# Patient Record
Sex: FEMALE | Race: WHITE | NOT HISPANIC OR LATINO | Employment: OTHER | URBAN - METROPOLITAN AREA
[De-identification: names, ages, dates, MRNs, and addresses within clinical notes are randomized per-mention and may not be internally consistent; named-entity substitution may affect disease eponyms.]

---

## 2017-01-03 ENCOUNTER — HOSPITAL ENCOUNTER (OUTPATIENT)
Dept: RADIOLOGY | Facility: HOSPITAL | Age: 72
Discharge: HOME | End: 2017-01-03
Attending: INTERNAL MEDICINE
Payer: MEDICARE

## 2017-01-03 ENCOUNTER — OFFICE VISIT (OUTPATIENT)
Dept: PULMONOLOGY | Facility: CLINIC | Age: 72
End: 2017-01-03
Payer: MEDICARE

## 2017-01-03 VITALS
SYSTOLIC BLOOD PRESSURE: 192 MMHG | HEIGHT: 66 IN | OXYGEN SATURATION: 97 % | BODY MASS INDEX: 20.2 KG/M2 | WEIGHT: 125.69 LBS | HEART RATE: 70 BPM | DIASTOLIC BLOOD PRESSURE: 92 MMHG

## 2017-01-03 DIAGNOSIS — I50.32 CHRONIC DIASTOLIC CONGESTIVE HEART FAILURE: ICD-10-CM

## 2017-01-03 DIAGNOSIS — J90 PLEURAL EFFUSION, BILATERAL: ICD-10-CM

## 2017-01-03 DIAGNOSIS — R06.09 DYSPNEA ON EXERTION: Primary | ICD-10-CM

## 2017-01-03 DIAGNOSIS — R60.0 LEG EDEMA, LEFT: ICD-10-CM

## 2017-01-03 DIAGNOSIS — R05.3 CHRONIC COUGH: ICD-10-CM

## 2017-01-03 PROCEDURE — 93971 EXTREMITY STUDY: CPT | Mod: 26,LT,, | Performed by: RADIOLOGY

## 2017-01-03 PROCEDURE — 1126F AMNT PAIN NOTED NONE PRSNT: CPT | Mod: S$GLB,,, | Performed by: INTERNAL MEDICINE

## 2017-01-03 PROCEDURE — 1157F ADVNC CARE PLAN IN RCRD: CPT | Mod: S$GLB,,, | Performed by: INTERNAL MEDICINE

## 2017-01-03 PROCEDURE — 93971 EXTREMITY STUDY: CPT | Mod: TC

## 2017-01-03 PROCEDURE — 99999 PR PBB SHADOW E&M-EST. PATIENT-LVL V: CPT | Mod: PBBFAC,,, | Performed by: INTERNAL MEDICINE

## 2017-01-03 PROCEDURE — 1159F MED LIST DOCD IN RCRD: CPT | Mod: S$GLB,,, | Performed by: INTERNAL MEDICINE

## 2017-01-03 PROCEDURE — 99499 UNLISTED E&M SERVICE: CPT | Mod: S$PBB,,, | Performed by: INTERNAL MEDICINE

## 2017-01-03 PROCEDURE — 1160F RVW MEDS BY RX/DR IN RCRD: CPT | Mod: S$GLB,,, | Performed by: INTERNAL MEDICINE

## 2017-01-03 PROCEDURE — 99205 OFFICE O/P NEW HI 60 MIN: CPT | Mod: S$GLB,,, | Performed by: INTERNAL MEDICINE

## 2017-01-03 RX ORDER — ACETAMINOPHEN AND CODEINE PHOSPHATE 300; 30 MG/1; MG/1
1 TABLET ORAL 4 TIMES DAILY PRN
Qty: 90 TABLET | Refills: 0 | Status: SHIPPED | OUTPATIENT
Start: 2017-01-03 | End: 2017-01-13

## 2017-01-03 RX ORDER — CODEINE PHOSPHATE AND GUAIFENESIN 10; 100 MG/5ML; MG/5ML
5 SOLUTION ORAL 3 TIMES DAILY PRN
COMMUNITY
End: 2017-01-25 | Stop reason: SINTOL

## 2017-01-03 NOTE — PROGRESS NOTES
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  Over 10 yrs kidney did well til this yr.  Has had freq blood transfusions.  Has pnd and orhopnea improved with dialysis.    Had leg swelling and cough/sob with edema.  Dialysis 2 weeks ago for emergent dialysis- daily for 3 and now mwf.  Cough and sob has improved a bit.          The chief compliant  problem is new to me",   PFSH:  Past Medical History   Diagnosis Date    Anemia     BK viruria 2011    Bursitis of left hip     Cataract     Chronic kidney disease     Disorder of kidney and ureter     Encounter for blood transfusion     GERD (gastroesophageal reflux disease)      takes prilosec    Hyperlipidemia     Hypertension     Hypothyroidism     Osteoarthritis     Osteoporosis     Peptic ulcer disease     Primary biliary cirrhosis , ,      3 Liver Transplants (first 2 in same mo)    Sciatica     Urinary tract infection          Past Surgical History   Procedure Laterality Date    Combined liver-kidney transplant  2006     Primary Biliary Cirrhosis    Hepaticojejunostomy (pretty en y)  10/2006    3 previous liver transplants  1991-      at [place]    Cataract extraction bilateral w/ anterior vitrectomy      Appendectomy      Kidney transplant  2006     25% of Both Kidneys/Third Kidney     section, classic      Cholecystectomy      Cystoscopy      Tubal ligation      Tonsillectomy      Liver transplant       #3 liver transplants     Eye surgery       Social History   Substance Use Topics    Smoking status: Never Smoker    Smokeless tobacco: Never Used    Alcohol use No     Family History   Problem Relation Age of Onset    Hypertension " "Mother     Hypertension Father     No Known Problems Sister     Hypertension Brother     No Known Problems Brother     Kidney disease Neg Hx      Review of patient's allergies indicates:   Allergen Reactions    Flonase [fluticasone]      Local blisters    Banana Hives       Performance Status:The patient's activity level is functions out of house.      Review of Systems:  a review of eleven systems covering constitutional, Eye, HEENT, Psych, Respiratory, Cardiac, GI, , Musculoskeletal, Endocrine, Dermatologic was negative except for pertinent findings as listed ABOVE and below: as above.     Exam:Comprehensive exam done.   Visit Vitals    BP (!) 192/92 (BP Location: Right arm, Patient Position: Sitting, BP Method: Automatic)    Pulse 70    Ht 5' 5.5" (1.664 m)    Wt 57 kg (125 lb 10.6 oz)    SpO2 97%    BMI 20.59 kg/m2     Exam included Vitals as listed, and patient's appearance and affect and alertness and mood, oral exam for yeast and hygiene and pharynx lesions and Mallapatti (M) score, neck with inspection for jvd and masses and thyroid abnormalities and lymph nodes (supraclavicular and infraclavicular nodes and axillary also examined and noted if abn), chest exam included symmetry and effort and fremitus and percussion and auscultation, cardiac exam included rhythm and gallops and murmur and rubs and jvd and edema, abdominal exam for mass and hepatosplenomegaly and tenderness and hernias and bowel sounds, Musculoskeletal exam with muscle tone and posture and mobility/gait and  strength, and skin for rashes and cyanosis and pallor and turgor, extremity for clubbing.  Findings were normal except for pertinent findings listed below:  M 2 and left >> right leg edema, dull 1-2 finger breaths both lower lungs.    Radiographs (ct chest and cxr) reviewed: view by direct vision  cxr in oct with sm bilat effusions, ct abd earlier in yr bilat eff with good lung tissue.    Labs reviewed  labs      "     PFT results reviewed   The effort appears adequate.  The inspiratory portion of the flow volume loop is   normal.  The FVC and FEV1 are normal.  FEV1 measures 2.11 liters or 90% of   predicted.  FEV1/FVC is normal.  FEF 25 to 75 is in normal range.  After   administering an appropriate bronchodilator, no significant change is   appreciated.  Lung volumes demonstrate a normal TLC and FRC.  Diffusion capacity   is severely reduced.     IMPRESSION:  Pulmonary function studies demonstrate normal airflow and lung   volumes.  A severely reduced diffusion is present.  Tobacco consumption will   interfere with DLCO measurement and lead to  inaccurate results.  Clinical and   radiographic correlation is suggested.        HES/SN  dd: 02/14/2016 13:26:32 (CST)  td: 02/14/2016 18:44:50 (CST)  Doc ID   #[id number]  Job ID #[id number]    CONCLUSIONS     1 - Normal left ventricular systolic function (EF 55-60%).     2 - Concentric hypertrophy.     3 - Left ventricular diastolic dysfunction.     4 - Left atrial enlargement.     5 - Trivial aortic regurgitation.     6 - Moderate to severe mitral regurgitation.     7 - Mild tricuspid regurgitation.     8 - The estimated PA systolic pressure is 31 mmHg.     9 - Small to moderate pericardial effusion - appears greater anteriorly and right sided. No diastolic chamber collapse..             This document has been electronically    SIGNED BY: Fco Ball MD On: 11/16/2016 14:12    Plan:  Clinical impression is resonably certain and repeated evaluation prn +/- follow up will be needed as below.    Dacia was seen today for cough and shortness of breath.    Diagnoses and all orders for this visit:    Dyspnea on exertion  -     X-Ray Chest PA And Lateral; Future    Leg edema, left  -     US Lower Extremity Veins Left; Future    Pleural effusion, bilateral  -     X-Ray Chest PA And Lateral; Future    Chronic cough  -     acetaminophen-codeine 300-30mg (TYLENOL #3) 300-30 mg Tab; Take 1 tablet  by mouth 4 (four) times daily as needed (cough).  -     X-Ray Chest PA And Lateral; Future  -     umeclidinium-vilanterol (ANORO ELLIPTA) 62.5-25 mcg/actuation DsDv; Inhale 1 puff into the lungs once daily.    Chronic diastolic congestive heart failure  -     X-Ray Chest PA And Lateral; Future      Return if symptoms worsen or fail to improve.    Discussed with patient above for education the following:       Shortness of breath and cough likely due to fluid and diastolic dysfunction and mitral regurg (with high blood pressure).     Breathing test was good except for parameter lowered by anemia.    Chest xray and ct (abd) shows good lung tissue with fluid around both lungs (typical for heart/fluid problems as above).      Could try inhaler, anoro one a day, continue if helps.  Would recommend chest x ray once fluid optimal - hope cxr will be totally clear.    Codeine tablets reasonable as needed for cough.     Keep head of bed elevated, notify kidney and heart doctors of need to keep bed elevated.    Check swollen left leg for clot.

## 2017-01-03 NOTE — MR AVS SNAPSHOT
[facility]  [address]  [place] [address]  Phone: [phone]                  Dacia Hamilton   1/3/2017 11:00 AM   Office Visit    Description:  Female : 1945   Provider:  Krish Hilton MD   Department:  [facility]           Reason for Visit     Cough     Shortness of Breath           Diagnoses this Visit        Comments    Dyspnea on exertion    -  Primary     Leg edema, left         Pleural effusion, bilateral         Chronic cough         Chronic diastolic congestive heart failure                To Do List           Future Appointments        Provider Department Dept Phone    1/3/2017 2:30 PM [facility] US1 [facility] [phone]    2017 8:45 AM LAB, [facility] DRAW STATION [facility] Laboratory [phone]    2017 9:40 AM Kaylan Metz MD [facility] - Hematology [phone]    2017 11:00 AM CHAIR 07, [facility] CHEMO [facility] [phone]    2017 8:30 AM LAB, [place] [facility] [phone]      Goals (5 Years of Data)     None      Follow-Up and Disposition     Return if symptoms worsen or fail to improve.    Follow-up and Disposition History       These Medications        Disp Refills Start End    acetaminophen-codeine 300-30mg (TYLENOL #3) 300-30 mg Tab 90 tablet 0 1/3/2017 2017    Take 1 tablet by mouth 4 (four) times daily as needed (cough). - Oral    Pharmacy: [facility] [id number] [place] [address] Ph #: [phone]       umeclidinium-vilanterol (ANORO ELLIPTA) 62.5-25 mcg/actuation DsDv 1 each 11 1/3/2017     Inhale 1 puff into the lungs once daily. - Inhalation    Pharmacy: [facility] [id number] [place] [address] Ph #: [phone]         [facility] On Call     [facility] On [facility] Nurse Care Line -  Assistance  Registered nurses in the [facility] Center provide clinical advisement, health education, appointment booking, and  other advisory services.  Call for this free service at [phone].             Medications           Message regarding Medications     Verify the changes and/or additions to your medication regime listed below are the same as discussed with your clinician today.  If any of these changes or additions are incorrect, please notify your healthcare provider.        START taking these NEW medications        Refills    acetaminophen-codeine 300-30mg (TYLENOL #3) 300-30 mg Tab 0    Sig: Take 1 tablet by mouth 4 (four) times daily as needed (cough).    Class: Print    Route: Oral    umeclidinium-vilanterol (ANORO ELLIPTA) 62.5-25 mcg/actuation DsDv 11    Sig: Inhale 1 puff into the lungs once daily.    Class: Print    Route: Inhalation      STOP taking these medications     spironolactone (ALDACTONE) 25 MG tablet Take 25 mg by mouth once daily.           Verify that the below list of medications is an accurate representation of the medications you are currently taking.  If none reported, the list may be blank. If incorrect, please contact your healthcare provider. Carry this list with you in case of emergency.           Current Medications     alprazolam (XANAX) 0.25 MG tablet One po prn anxiety not to exceed one per day    amlodipine (NORVASC) 5 MG tablet Take 1 tablet (5 mg total) by mouth 2 (two) times daily.    calcitRIOL (ROCALTROL) 0.5 MCG Cap Take 1 capsule (0.5 mcg total) by mouth once daily.    calcium carbonate (OS-ALBINA) 600 mg (1,500 mg) Tab Take 600 mg by mouth 2 (two) times daily with meals.    cholestyramine (QUESTRAN) 4 gram packet Take 1 packet (4 g total) by mouth 2 (two) times daily.    fish oil-omega-3 fatty acids 300-1,000 mg capsule Take 2 g by mouth once daily.      folic acid (FOLVITE) 400 MCG tablet Take 800 mcg by mouth once daily.    furosemide (LASIX) 20 MG tablet Take 2 tablets (40 mg total) by mouth once daily.    guaifenesin-codeine 100-10 mg/5 ml (TUSSI-ORGANIDIN NR)  mg/5 mL syrup  "Take 5 mLs by mouth 3 (three) times daily as needed for Cough.    levothyroxine (SYNTHROID) 75 MCG tablet Take 1 tablet (75 mcg total) by mouth before breakfast. Every morning    losartan (COZAAR) 50 MG tablet One po at noon    magnesium oxide (MAG-OX) 400 mg tablet Take 1 tablet (400 mg total) by mouth 3 (three) times daily.    multivitamin capsule Every morning    mycophenolate (MYFORTIC) 180 MG TbEC Take 2 tablets (360 mg total) by mouth 3 (three) times daily after meals.    omeprazole (PRILOSEC) 20 MG capsule Take 1 capsule (20 mg total) by mouth once daily. Every day    phenyleph-min oil-petrolatum (PREPARATION H) 0.25-14-74.9 % Oint Place 1 applicator rectally 4 (four) times daily as needed.    RITUXIMAB (RITUXAN IV) Inject into the vein.    sodium bicarbonate 650 MG tablet Take 3 tablets (1,950 mg total) by mouth 2 (two) times daily.    tacrolimus (PROGRAF) 1 MG Cap Take 1 capsule (1 mg total) by mouth every 12 (twelve) hours.    zolpidem (AMBIEN) 5 MG Tab Take 1 tablet (5 mg total) by mouth nightly as needed (insomnia). At bedtime    acetaminophen-codeine 300-30mg (TYLENOL #3) 300-30 mg Tab Take 1 tablet by mouth 4 (four) times daily as needed (cough).    potassium chloride SA (K-DUR,KLOR-CON) 10 MEQ tablet Take 1 tablet (10 mEq total) by mouth every other day.    umeclidinium-vilanterol (ANORO ELLIPTA) 62.5-25 mcg/actuation DsDv Inhale 1 puff into the lungs once daily.           Clinical Reference Information           Vital Signs - Last Recorded  Most recent update: 1/3/2017 12:03 PM by Paula Jeffries MA    BP Pulse Ht Wt SpO2 BMI    (!) 192/92 (BP Location: Right arm, Patient Position: Sitting, BP Method: Automatic) 70 5' 5.5" (1.664 m) 57 kg (125 lb 10.6 oz) 97% 20.59 kg/m2      Blood Pressure          Most Recent Value    BP  (!)  192/92      Allergies as of 1/3/2017     Flonase [Fluticasone]    Banana      Immunizations Administered on Date of Encounter - 1/3/2017     None      Orders Placed " During Today's Visit     Future Labs/Procedures Expected by Expires    US Lower Extremity Veins Left  1/3/2017 1/3/2018    X-Ray Chest PA And Lateral  1/3/2017 1/3/2018      Maintenance Dialysis History     Patient has no recorded history of maintenance dialysis.      Transplant Information        Txp Date Organ Coordinator Care Team     Kidney Vianca Brambila Referring Physician:  Jose Alfredo Godfrey MD   Current Nephrologist:  Jose Alfredo Godfrey MD          Txp Date Organ Coordinator Care Team    6/4/2006 Liver Lorin Mejia RN Surgeon:  Brendan Amor MD   Assisting Surgeon:  Victor Hugo Mcintosh Jr., MD   Referring Physician:  Syeda Tong MD   Current Hepatologist:  Victor Hugo Wise MD   Corresponding Physician:  David Woods MD   Current PCP:  David Woods MD          Txp Date Organ Coordinator Care Team    6/4/2006 Kidney Lorin Mejia RN Surgeon:  Brendan Amor MD   Referring Physician:  Syeda Tong MD         Instructions    Shortness of breath and cough likely due to fluid and diastolic dysfunction and mitral regurg (with high blood pressure).     Breathing test was good except for parameter lowered by anemia.    Chest xray and ct (abd) shows good lung tissue with fluid around both lungs (typical for heart/fluid problems as above).      Could try inhaler, anoro one a day, continue if helps.  Would recommend chest x ray once fluid optimal - hope cxr will be totally clear.    Codeine tablets reasonable as needed for cough.     Keep head of bed elevated, notify kidney and heart doctors of need to keep bed elevated.    Check swollen left leg for clot.

## 2017-01-03 NOTE — PATIENT INSTRUCTIONS
Shortness of breath and cough likely due to fluid and diastolic dysfunction and mitral regurg (with high blood pressure).     Breathing test was good except for parameter lowered by anemia.    Chest xray and ct (abd) shows good lung tissue with fluid around both lungs (typical for heart/fluid problems as above).      Could try inhaler, anoro one a day, continue if helps.  Would recommend chest x ray once fluid optimal - hope cxr will be totally clear.    Codeine tablets reasonable as needed for cough.     Keep head of bed elevated, notify kidney and heart doctors of need to keep bed elevated.    Check swollen left leg for clot.

## 2017-01-04 ENCOUNTER — PATIENT MESSAGE (OUTPATIENT)
Dept: NEPHROLOGY | Facility: CLINIC | Age: 72
End: 2017-01-04

## 2017-01-04 ENCOUNTER — TELEPHONE (OUTPATIENT)
Dept: NEPHROLOGY | Facility: CLINIC | Age: 72
End: 2017-01-04

## 2017-01-04 NOTE — TELEPHONE ENCOUNTER
----- Message from Tomeka Dove sent at 1/3/2017  4:38 PM CST -----  Contact: Dinorah at [facility]   Dinorah at [facility] called stating that a refill request was sent but the LPN  signed it.  It can not be singed by a LPN   She will fax it again for the doctor's or NP signature  Just an SRI     Thanks

## 2017-01-05 ENCOUNTER — OFFICE VISIT (OUTPATIENT)
Dept: HEMATOLOGY/ONCOLOGY | Facility: CLINIC | Age: 72
End: 2017-01-05
Payer: MEDICARE

## 2017-01-05 ENCOUNTER — TELEPHONE (OUTPATIENT)
Dept: PULMONOLOGY | Facility: CLINIC | Age: 72
End: 2017-01-05

## 2017-01-05 ENCOUNTER — TELEPHONE (OUTPATIENT)
Dept: HEMATOLOGY/ONCOLOGY | Facility: CLINIC | Age: 72
End: 2017-01-05

## 2017-01-05 ENCOUNTER — INFUSION (OUTPATIENT)
Dept: INFUSION THERAPY | Facility: HOSPITAL | Age: 72
End: 2017-01-05
Attending: INTERNAL MEDICINE
Payer: MEDICARE

## 2017-01-05 VITALS
HEIGHT: 65 IN | SYSTOLIC BLOOD PRESSURE: 193 MMHG | WEIGHT: 129.88 LBS | DIASTOLIC BLOOD PRESSURE: 89 MMHG | RESPIRATION RATE: 19 BRPM | BODY MASS INDEX: 21.64 KG/M2 | HEART RATE: 70 BPM

## 2017-01-05 VITALS
TEMPERATURE: 98 F | RESPIRATION RATE: 16 BRPM | HEART RATE: 69 BPM | DIASTOLIC BLOOD PRESSURE: 87 MMHG | SYSTOLIC BLOOD PRESSURE: 163 MMHG

## 2017-01-05 DIAGNOSIS — D64.9 SEVERE ANEMIA: Primary | ICD-10-CM

## 2017-01-05 DIAGNOSIS — N18.5 CKD (CHRONIC KIDNEY DISEASE) STAGE 5, GFR LESS THAN 15 ML/MIN: ICD-10-CM

## 2017-01-05 DIAGNOSIS — D63.1 ANEMIA IN CKD (CHRONIC KIDNEY DISEASE): Primary | ICD-10-CM

## 2017-01-05 DIAGNOSIS — D64.9 SEVERE ANEMIA: ICD-10-CM

## 2017-01-05 DIAGNOSIS — Z29.89 PROPHYLACTIC IMMUNOTHERAPY: ICD-10-CM

## 2017-01-05 DIAGNOSIS — Z94.0 S/P KIDNEY TRANSPLANT: ICD-10-CM

## 2017-01-05 DIAGNOSIS — I10 ESSENTIAL HYPERTENSION: ICD-10-CM

## 2017-01-05 DIAGNOSIS — N18.9 ANEMIA IN CKD (CHRONIC KIDNEY DISEASE): Primary | ICD-10-CM

## 2017-01-05 PROCEDURE — 99999 PR PBB SHADOW E&M-EST. PATIENT-LVL III: CPT | Mod: PBBFAC,,, | Performed by: INTERNAL MEDICINE

## 2017-01-05 PROCEDURE — 1160F RVW MEDS BY RX/DR IN RCRD: CPT | Mod: S$GLB,,, | Performed by: INTERNAL MEDICINE

## 2017-01-05 PROCEDURE — 96367 TX/PROPH/DG ADDL SEQ IV INF: CPT | Mod: PN

## 2017-01-05 PROCEDURE — 25000003 PHARM REV CODE 250: Mod: PN | Performed by: INTERNAL MEDICINE

## 2017-01-05 PROCEDURE — 1157F ADVNC CARE PLAN IN RCRD: CPT | Mod: S$GLB,,, | Performed by: INTERNAL MEDICINE

## 2017-01-05 PROCEDURE — 99215 OFFICE O/P EST HI 40 MIN: CPT | Mod: S$GLB,,, | Performed by: INTERNAL MEDICINE

## 2017-01-05 PROCEDURE — 1159F MED LIST DOCD IN RCRD: CPT | Mod: S$GLB,,, | Performed by: INTERNAL MEDICINE

## 2017-01-05 PROCEDURE — 1126F AMNT PAIN NOTED NONE PRSNT: CPT | Mod: S$GLB,,, | Performed by: INTERNAL MEDICINE

## 2017-01-05 PROCEDURE — 96413 CHEMO IV INFUSION 1 HR: CPT | Mod: PN

## 2017-01-05 PROCEDURE — 96415 CHEMO IV INFUSION ADDL HR: CPT | Mod: PN

## 2017-01-05 PROCEDURE — 63600175 PHARM REV CODE 636 W HCPCS: Mod: PN | Performed by: INTERNAL MEDICINE

## 2017-01-05 PROCEDURE — 99499 UNLISTED E&M SERVICE: CPT | Mod: S$PBB,,, | Performed by: INTERNAL MEDICINE

## 2017-01-05 RX ORDER — FAMOTIDINE 20 MG/50ML
20 INJECTION, SOLUTION INTRAVENOUS
Status: CANCELLED | OUTPATIENT
Start: 2017-01-05

## 2017-01-05 RX ORDER — MEPERIDINE HYDROCHLORIDE 50 MG/ML
25 INJECTION INTRAMUSCULAR; INTRAVENOUS; SUBCUTANEOUS
Status: DISCONTINUED | OUTPATIENT
Start: 2017-01-05 | End: 2017-01-05 | Stop reason: HOSPADM

## 2017-01-05 RX ORDER — SODIUM CHLORIDE 0.9 % (FLUSH) 0.9 %
10 SYRINGE (ML) INJECTION
Status: CANCELLED | OUTPATIENT
Start: 2017-01-05

## 2017-01-05 RX ORDER — ACETAMINOPHEN 325 MG/1
650 TABLET ORAL
Status: COMPLETED | OUTPATIENT
Start: 2017-01-05 | End: 2017-01-05

## 2017-01-05 RX ORDER — ACETAMINOPHEN 325 MG/1
650 TABLET ORAL
Status: CANCELLED | OUTPATIENT
Start: 2017-01-05

## 2017-01-05 RX ORDER — HEPARIN 100 UNIT/ML
500 SYRINGE INTRAVENOUS
Status: CANCELLED | OUTPATIENT
Start: 2017-01-05

## 2017-01-05 RX ORDER — MEPERIDINE HYDROCHLORIDE 25 MG/ML
25 INJECTION INTRAMUSCULAR; INTRAVENOUS; SUBCUTANEOUS
Status: CANCELLED | OUTPATIENT
Start: 2017-01-05 | End: 2017-01-05

## 2017-01-05 RX ORDER — SODIUM CHLORIDE 0.9 % (FLUSH) 0.9 %
10 SYRINGE (ML) INJECTION
Status: DISCONTINUED | OUTPATIENT
Start: 2017-01-05 | End: 2017-01-05 | Stop reason: HOSPADM

## 2017-01-05 RX ORDER — FAMOTIDINE 20 MG/50ML
20 INJECTION, SOLUTION INTRAVENOUS
Status: COMPLETED | OUTPATIENT
Start: 2017-01-05 | End: 2017-01-05

## 2017-01-05 RX ADMIN — DIPHENHYDRAMINE HYDROCHLORIDE 25 MG: 50 INJECTION, SOLUTION INTRAMUSCULAR; INTRAVENOUS at 11:01

## 2017-01-05 RX ADMIN — FAMOTIDINE 20 MG: 20 INJECTION, SOLUTION INTRAVENOUS at 11:01

## 2017-01-05 RX ADMIN — Medication 10 ML: at 11:01

## 2017-01-05 RX ADMIN — RITUXIMAB 630 MG: 10 INJECTION, SOLUTION INTRAVENOUS at 12:01

## 2017-01-05 RX ADMIN — SODIUM CHLORIDE: 0.9 INJECTION, SOLUTION INTRAVENOUS at 11:01

## 2017-01-05 RX ADMIN — ACETAMINOPHEN 650 MG: 325 TABLET ORAL at 10:01

## 2017-01-05 RX ADMIN — Medication 10 ML: at 02:01

## 2017-01-05 NOTE — TELEPHONE ENCOUNTER
Pt is in clinic today seeing Dr. Metz. Pt states she tried to  rx from pharmacy yesterday & her pharmacy informed her the prescription is no longer active.     Please contact pt to inform if this is still needed & if so, please send in new rx. Pt states she takes lasix 20 mg TID, please make sure dispense quantity reflects this.

## 2017-01-05 NOTE — TELEPHONE ENCOUNTER
Spoke to patient gave information from us fe[facility] lower ext.  No deep venous thrombosis, no clot.  Patient does have baker's cyst on left leg.  forwareded results to Dr Woods.  Asked patient to follow up with her PCP and may need top see orthopedic MD. Patient stated that she understands and  Agrees.

## 2017-01-05 NOTE — TELEPHONE ENCOUNTER
----- Message from Krish Hilton MD sent at 1/3/2017  4:04 PM CST -----  No blood clot, has cyst, no significant findings-- good.

## 2017-01-05 NOTE — PROGRESS NOTES
Subjective:       Patient ID: Dacia Hamilton is a 71 y.o. female.    Chief Complaint:   HPI:     70-year-old  woman coming in followup  . The patient has had a liver transplant for PBC as well as  kidney transplant. Two liver transplants done in [place] in 1991, combined    liver kidney transplant in [facility] in June 2006 for recurrent primary biliary    cirrhosis. She has CKD nearing dialysis GFR of 20, the patient is on Prograf and mycofenolate as    maintenance immunosuppression. The patient has had significant diarrhea needing  Imodium, Myfortic 360 mg t.i.d. No relief , then started on cholestyramine.with good response lately, had EBV and Parvo drawn.this was negative    . Patient had a recent bone marrow biopsy in July 2016, which showed    no significant issues that would explain the severity of her anemia. The anemia  has also been progressive, not responding to Epogen injections. She has also    had increased iron stores. Multiple transfusions, Recently the immuniosuppressive doses have been decreased,    .            after discussions with transplant and noting haptoglobin to be low. Pt was placed on 20 mg prednisone per transplants recommended doses pt had difficulty tolerating prednisone so started rituxan weekly. pt was transfused 10 days ago for significant anemia, and has started dialysis. And feels much better overall, feels like all the swelling has improved and not as tired. Last rituxan was delayed due to av access       PHYSICAL EXAM:     Vitals:    01/05/17 0917   BP: (!) 193/89   Pulse: 70   Resp: 19   wt 129lbs    GENERAL: Comfortable looking patient. Patient is in no distress.chronically ill appearing  Awake, alert and oriented to time, person and place.  No anxiety, or agitation.      HEENT: Normal conjunctivae and eyelids. WNL.  PERRLA 3 to 4 mm. No icterus, no pallor, no congestion, and no discharge noted.     NECK:  Supple. Trachea is central.  No crepitus.  No JVD or  masses.    RESPIRATORY:  No intercostal retractions.  No dullness to percussion.  Chest is clear to auscultation.  No rales, rhonchi or wheezes.  No crepitus.  Good air entry bilaterally.    CARDIOVASCULAR:  S1 and S2 are normally heard without murmurs or gallops.  All peripheral pulses are present.    ABDOMEN:  Normal abdomen.  No hepatosplenomegaly.  No free fluid.  Bowel sounds are present.  No hernia noted. No masses.  No rebound or tenderness.  No guarding or rigidity.  Umbilicus is midline.    LYMPHATICS:  No axillary, cervical, supraclavicular, submental, or inguinal lymphadenopathy.    SKIN/MUSCULOSKELETAL:  There is no evidence of excoriation marks or ecchmosis.  No rashes.  No cyanosis.  No clubbing.  No joint or skeletal deformities noted.  Normal range of motion.    NEUROLOGIC:  Higher functions are appropriate.  No cranial nerve deficits.  Normal fadia.  Normal strength.  Motor and sensory functions are normal.  Deep tendon reflexes are normal.    GENITAL/RECTAL:  Exams are deferred.      Laboratory:     CBC:  Lab Results   Component Value Date    WBC 2.22 (L) 01/05/2017    RBC 3.17 (L) 01/05/2017    HGB 9.4 (L) 01/05/2017    HCT 30.2 (L) 01/05/2017    MCV 95 01/05/2017    MCH 29.7 01/05/2017    MCHC 31.1 (L) 01/05/2017    RDW 19.6 (H) 01/05/2017     01/05/2017    MPV 9.9 01/05/2017    GRAN 1.4 (L) 01/05/2017    GRAN 64.4 01/05/2017    LYMPH 0.3 (L) 01/05/2017    LYMPH 13.1 (L) 01/05/2017    MONO 0.4 01/05/2017    MONO 16.2 (H) 01/05/2017    EOS 0.1 01/05/2017    BASO 0.02 01/05/2017    EOSINOPHIL 5.4 01/05/2017    BASOPHIL 0.9 01/05/2017       BMP: BMP  Lab Results   Component Value Date     (L) 01/05/2017    K 3.2 (L) 01/05/2017    CL 93 (L) 01/05/2017    CO2 37 (H) 01/05/2017    BUN 9 01/05/2017    CREATININE 1.87 (H) 01/05/2017    CALCIUM 8.2 (L) 01/05/2017    ANIONGAP 5 (L) 01/05/2017    ESTGFRAFRICA 30 (A) 01/05/2017    EGFRNONAA 26 (A) 01/05/2017       LFT:   Lab Results   Component  Value Date    ALT 26 01/05/2017    AST 30 01/05/2017    GGT 25 09/02/2014    ALKPHOS 58 01/05/2017    BILITOT 0.7 01/05/2017       Assessment/Plan:       1. Anemia in CKD (chronic kidney disease)    2. Severe anemia    3. Essential hypertension    4. CKD (chronic kidney disease) stage 5, GFR less than 15 ml/min    5. S/P kidney transplant    6. Prophylactic immunotherapy        Proceed with rituxan for hemolytic anemia.   Could she come off some immunosuppresants not that she has started dialysis   rtc 1 week with cbc, cmp for ongoing rituxan

## 2017-01-06 RX ORDER — FUROSEMIDE 40 MG/1
TABLET ORAL
Qty: 60 TABLET | Refills: 4 | Status: SHIPPED | OUTPATIENT
Start: 2017-01-06 | End: 2017-01-15 | Stop reason: SDUPTHER

## 2017-01-06 NOTE — PLAN OF CARE
Problem: Patient Care Overview (Adult)  Goal: Plan of Care Review  Outcome: Ongoing (interventions implemented as appropriate)  Adequate for discharge.   Pt tolerated infusion without noted distress.  Reviewed upcoming appointments.  All questions answered.  Ambulated from infusion center independently with daughter.

## 2017-01-09 ENCOUNTER — LAB VISIT (OUTPATIENT)
Dept: LAB | Facility: HOSPITAL | Age: 72
End: 2017-01-09
Attending: INTERNAL MEDICINE
Payer: MEDICARE

## 2017-01-09 ENCOUNTER — TELEPHONE (OUTPATIENT)
Dept: TRANSPLANT | Facility: CLINIC | Age: 72
End: 2017-01-09

## 2017-01-09 DIAGNOSIS — Z94.4 LIVER TRANSPLANTED: ICD-10-CM

## 2017-01-09 LAB
ALBUMIN SERPL BCP-MCNC: 3 G/DL
ALP SERPL-CCNC: 67 U/L
ALT SERPL W/O P-5'-P-CCNC: 12 U/L
ANION GAP SERPL CALC-SCNC: 10 MMOL/L
AST SERPL-CCNC: 24 U/L
BASOPHILS # BLD AUTO: ABNORMAL K/UL
BASOPHILS NFR BLD: 1 %
BILIRUB SERPL-MCNC: 0.6 MG/DL
BUN SERPL-MCNC: 17 MG/DL
CALCIUM SERPL-MCNC: 8.8 MG/DL
CHLORIDE SERPL-SCNC: 95 MMOL/L
CO2 SERPL-SCNC: 31 MMOL/L
CREAT SERPL-MCNC: 2.9 MG/DL
DIFFERENTIAL METHOD: ABNORMAL
EOSINOPHIL # BLD AUTO: ABNORMAL K/UL
EOSINOPHIL NFR BLD: 9 %
ERYTHROCYTE [DISTWIDTH] IN BLOOD BY AUTOMATED COUNT: 19.4 %
EST. GFR  (AFRICAN AMERICAN): 17.8 ML/MIN/1.73 M^2
EST. GFR  (NON AFRICAN AMERICAN): 15.5 ML/MIN/1.73 M^2
GLUCOSE SERPL-MCNC: 84 MG/DL
HCT VFR BLD AUTO: 31.5 %
HGB BLD-MCNC: 10.2 G/DL
LYMPHOCYTES # BLD AUTO: ABNORMAL K/UL
LYMPHOCYTES NFR BLD: 16 %
MCH RBC QN AUTO: 29.7 PG
MCHC RBC AUTO-ENTMCNC: 32.4 %
MCV RBC AUTO: 92 FL
MONOCYTES # BLD AUTO: ABNORMAL K/UL
MONOCYTES NFR BLD: 10 %
NEUTROPHILS NFR BLD: 64 %
PLATELET # BLD AUTO: 252 K/UL
PLATELET BLD QL SMEAR: ABNORMAL
PMV BLD AUTO: 10.5 FL
POTASSIUM SERPL-SCNC: 3.8 MMOL/L
PROT SERPL-MCNC: 5.6 G/DL
RBC # BLD AUTO: 3.43 M/UL
SODIUM SERPL-SCNC: 136 MMOL/L
WBC # BLD AUTO: 1.84 K/UL

## 2017-01-09 PROCEDURE — 80053 COMPREHEN METABOLIC PANEL: CPT

## 2017-01-09 PROCEDURE — 36415 COLL VENOUS BLD VENIPUNCTURE: CPT | Mod: PO

## 2017-01-09 PROCEDURE — 80197 ASSAY OF TACROLIMUS: CPT

## 2017-01-09 PROCEDURE — 85007 BL SMEAR W/DIFF WBC COUNT: CPT

## 2017-01-09 PROCEDURE — 85027 COMPLETE CBC AUTOMATED: CPT

## 2017-01-09 NOTE — TELEPHONE ENCOUNTER
----- Message from Ashlee River MD sent at 10/27/2016  7:40 PM CDT -----  Yes, feel free to use rituxan.  Honestly, she is high risk candidate for txp based on age and prior immunosuppression; she cannot be approved for txp until hemolysis is controlled. (she is still in w/u)    ----- Message -----     From: Vianca Brambila     Sent: 10/27/2016  10:19 AM       To: MD Dr. Ozzy Corona,    Please see message below. Thanks!    ----- Message -----     From: Kaylan Metz MD     Sent: 10/27/2016  10:15 AM       To: Vianca Brambila    Regarding above pt, I started her on prednisone and she was intolerant of it.   Is it ok with transplant if I start rituxan for hemolytic anemia so i could get her hgb up.   Please let me know asap   Dr. Metz

## 2017-01-10 ENCOUNTER — TELEPHONE (OUTPATIENT)
Dept: TRANSPLANT | Facility: CLINIC | Age: 72
End: 2017-01-10

## 2017-01-10 LAB — TACROLIMUS BLD-MCNC: 3.2 NG/ML

## 2017-01-10 NOTE — TELEPHONE ENCOUNTER
----- Message from Victor Hugo Wise MD sent at 1/10/2017 10:30 AM CST -----  Results reviewed  Why is her WCC so low? Any new meds? CMV?

## 2017-01-10 NOTE — TELEPHONE ENCOUNTER
Called and spoke with pt.  States she feels well. Denies new medications, fever or any s/s of infection.  Advised her that her WBC was low. Will call her back with Dr. Wise' recommendations. Pt agreed with plan.

## 2017-01-11 ENCOUNTER — TELEPHONE (OUTPATIENT)
Dept: TRANSPLANT | Facility: CLINIC | Age: 72
End: 2017-01-11

## 2017-01-11 ENCOUNTER — PATIENT MESSAGE (OUTPATIENT)
Dept: TRANSPLANT | Facility: CLINIC | Age: 72
End: 2017-01-11

## 2017-01-11 DIAGNOSIS — Z94.4 LIVER TRANSPLANTED: Primary | ICD-10-CM

## 2017-01-11 NOTE — TELEPHONE ENCOUNTER
Per Dr. Wise pt to repeat labs 1/16/17 with CMV.     Called pt. No answer. LVM - repeat CBC with CMV Monday. Requested return call with any questions or concerns.

## 2017-01-12 ENCOUNTER — TELEPHONE (OUTPATIENT)
Dept: TRANSPLANT | Facility: CLINIC | Age: 72
End: 2017-01-12

## 2017-01-12 ENCOUNTER — INFUSION (OUTPATIENT)
Dept: INFUSION THERAPY | Facility: HOSPITAL | Age: 72
End: 2017-01-12
Attending: INTERNAL MEDICINE
Payer: MEDICARE

## 2017-01-12 ENCOUNTER — OFFICE VISIT (OUTPATIENT)
Dept: HEMATOLOGY/ONCOLOGY | Facility: CLINIC | Age: 72
End: 2017-01-12
Payer: MEDICARE

## 2017-01-12 VITALS — RESPIRATION RATE: 16 BRPM | DIASTOLIC BLOOD PRESSURE: 92 MMHG | SYSTOLIC BLOOD PRESSURE: 190 MMHG | HEART RATE: 67 BPM

## 2017-01-12 VITALS
HEIGHT: 65 IN | HEART RATE: 72 BPM | DIASTOLIC BLOOD PRESSURE: 84 MMHG | SYSTOLIC BLOOD PRESSURE: 173 MMHG | WEIGHT: 125.69 LBS | RESPIRATION RATE: 16 BRPM | TEMPERATURE: 98 F | BODY MASS INDEX: 20.94 KG/M2

## 2017-01-12 DIAGNOSIS — Z94.0 S/P KIDNEY TRANSPLANT: ICD-10-CM

## 2017-01-12 DIAGNOSIS — D63.1 ANEMIA IN CKD (CHRONIC KIDNEY DISEASE): Primary | ICD-10-CM

## 2017-01-12 DIAGNOSIS — Z76.82 KIDNEY TRANSPLANT CANDIDATE: ICD-10-CM

## 2017-01-12 DIAGNOSIS — N18.9 ANEMIA IN CKD (CHRONIC KIDNEY DISEASE): Primary | ICD-10-CM

## 2017-01-12 DIAGNOSIS — D64.9 SEVERE ANEMIA: ICD-10-CM

## 2017-01-12 DIAGNOSIS — D64.9 SEVERE ANEMIA: Primary | ICD-10-CM

## 2017-01-12 DIAGNOSIS — I50.32 CHRONIC DIASTOLIC CONGESTIVE HEART FAILURE: ICD-10-CM

## 2017-01-12 DIAGNOSIS — J90 PLEURAL EFFUSION, BILATERAL: Primary | ICD-10-CM

## 2017-01-12 DIAGNOSIS — I10 ESSENTIAL HYPERTENSION: ICD-10-CM

## 2017-01-12 DIAGNOSIS — D84.9 IMMUNOSUPPRESSED STATUS: ICD-10-CM

## 2017-01-12 PROCEDURE — 96415 CHEMO IV INFUSION ADDL HR: CPT | Mod: PN

## 2017-01-12 PROCEDURE — 1160F RVW MEDS BY RX/DR IN RCRD: CPT | Mod: S$GLB,,, | Performed by: INTERNAL MEDICINE

## 2017-01-12 PROCEDURE — 99999 PR PBB SHADOW E&M-EST. PATIENT-LVL III: CPT | Mod: PBBFAC,,, | Performed by: INTERNAL MEDICINE

## 2017-01-12 PROCEDURE — 25000003 PHARM REV CODE 250: Mod: PN | Performed by: INTERNAL MEDICINE

## 2017-01-12 PROCEDURE — 63600175 PHARM REV CODE 636 W HCPCS: Mod: PN | Performed by: INTERNAL MEDICINE

## 2017-01-12 PROCEDURE — 99215 OFFICE O/P EST HI 40 MIN: CPT | Mod: S$GLB,,, | Performed by: INTERNAL MEDICINE

## 2017-01-12 PROCEDURE — 1126F AMNT PAIN NOTED NONE PRSNT: CPT | Mod: S$GLB,,, | Performed by: INTERNAL MEDICINE

## 2017-01-12 PROCEDURE — 1157F ADVNC CARE PLAN IN RCRD: CPT | Mod: S$GLB,,, | Performed by: INTERNAL MEDICINE

## 2017-01-12 PROCEDURE — 1159F MED LIST DOCD IN RCRD: CPT | Mod: S$GLB,,, | Performed by: INTERNAL MEDICINE

## 2017-01-12 PROCEDURE — 96413 CHEMO IV INFUSION 1 HR: CPT | Mod: PN

## 2017-01-12 PROCEDURE — 99499 UNLISTED E&M SERVICE: CPT | Mod: S$PBB,,, | Performed by: INTERNAL MEDICINE

## 2017-01-12 PROCEDURE — 96367 TX/PROPH/DG ADDL SEQ IV INF: CPT | Mod: PN

## 2017-01-12 RX ORDER — MEPERIDINE HYDROCHLORIDE 25 MG/ML
25 INJECTION INTRAMUSCULAR; INTRAVENOUS; SUBCUTANEOUS
Status: CANCELLED | OUTPATIENT
Start: 2017-01-12 | End: 2017-01-12

## 2017-01-12 RX ORDER — ACETAMINOPHEN 325 MG/1
650 TABLET ORAL
Status: COMPLETED | OUTPATIENT
Start: 2017-01-12 | End: 2017-01-12

## 2017-01-12 RX ORDER — FAMOTIDINE 20 MG/50ML
20 INJECTION, SOLUTION INTRAVENOUS
Status: COMPLETED | OUTPATIENT
Start: 2017-01-12 | End: 2017-01-12

## 2017-01-12 RX ORDER — HEPARIN 100 UNIT/ML
500 SYRINGE INTRAVENOUS
Status: CANCELLED | OUTPATIENT
Start: 2017-01-12

## 2017-01-12 RX ORDER — ACETAMINOPHEN 325 MG/1
650 TABLET ORAL
Status: CANCELLED | OUTPATIENT
Start: 2017-01-12

## 2017-01-12 RX ORDER — FAMOTIDINE 20 MG/50ML
20 INJECTION, SOLUTION INTRAVENOUS
Status: CANCELLED | OUTPATIENT
Start: 2017-01-12 | End: 2017-01-12

## 2017-01-12 RX ORDER — FOLIC ACID/VIT B COMPLEX AND C 5 MG
1 TABLET ORAL NIGHTLY
Refills: 3 | COMMUNITY
Start: 2017-01-06 | End: 2017-04-11 | Stop reason: SDUPTHER

## 2017-01-12 RX ORDER — SODIUM CHLORIDE 0.9 % (FLUSH) 0.9 %
10 SYRINGE (ML) INJECTION
Status: DISCONTINUED | OUTPATIENT
Start: 2017-01-12 | End: 2017-01-12 | Stop reason: HOSPADM

## 2017-01-12 RX ORDER — SODIUM CHLORIDE 0.9 % (FLUSH) 0.9 %
10 SYRINGE (ML) INJECTION
Status: CANCELLED | OUTPATIENT
Start: 2017-01-12

## 2017-01-12 RX ADMIN — ACETAMINOPHEN 650 MG: 325 TABLET ORAL at 11:01

## 2017-01-12 RX ADMIN — SODIUM CHLORIDE, PRESERVATIVE FREE 10 ML: 5 INJECTION INTRAVENOUS at 11:01

## 2017-01-12 RX ADMIN — DIPHENHYDRAMINE HYDROCHLORIDE 25 MG: 50 INJECTION, SOLUTION INTRAMUSCULAR; INTRAVENOUS at 12:01

## 2017-01-12 RX ADMIN — SODIUM CHLORIDE: 9 INJECTION, SOLUTION INTRAVENOUS at 11:01

## 2017-01-12 RX ADMIN — FAMOTIDINE 20 MG: 20 INJECTION, SOLUTION INTRAVENOUS at 11:01

## 2017-01-12 RX ADMIN — RITUXIMAB 630 MG: 10 INJECTION, SOLUTION INTRAVENOUS at 12:01

## 2017-01-12 NOTE — TELEPHONE ENCOUNTER
----- Message from Kaylan Metz MD sent at 1/12/2017  9:52 AM CST -----  Ms. Pederson has started her dialysis and seems to be settling well to the routine. She sent a message wondering if any immunosuppressive drugs can be lowered in order to help with the severe anemia.   She and I are both surprised that the transplant discussion is still on board.   The rituxan can be changed or discontd if needed. At this point I think the meds contribute to the degree of anemia and so I am not seeing an adequate response to rituxan   Dr. Metz

## 2017-01-12 NOTE — PLAN OF CARE
Problem: Patient Care Overview (Adult)  Goal: Plan of Care Review  Outcome: Ongoing (interventions implemented as appropriate)  Pt tolerated treatment without complaints.

## 2017-01-12 NOTE — PROGRESS NOTES
Subjective:       Patient ID: Dacia Hamilton is a 71 y.o. female.    Chief Complaint:   HPI:     70-year-old  woman coming in followup  . The patient has had a liver transplant for PBC as well as  kidney transplant. 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 She has CKD nearing dialysis GFR of 20, the patient is on Prograf and mycofenolate as    maintenance immunosuppression. The patient has had significant diarrhea needing  Imodium, Myfortic 360 mg t.i.d. No relief , then started on cholestyramine.with good response lately, had EBV and Parvo drawn.this was negative    . Patient had a recent bone marrow biopsy in July 2016, which showed    no significant issues that would explain the severity of her anemia. The anemia  has also been progressive, not responding to Epogen injections. She has also    had increased iron stores. Multiple transfusions, Recently the immuniosuppressive doses have been decreased,    .            after discussions with transplant and noting haptoglobin to be low. Pt was placed on 20 mg prednisone per transplants recommended doses pt had difficulty tolerating prednisone so started rituxan weekly. pt was transfused 10 days ago for significant anemia, and has started dialysis. And feels much better overall, feels like all the swelling has improved and not as tired. Last rituxan was delayed due to av access   Doing well on dialysis. Feeling well    PHYSICAL EXAM:     Vitals:    01/12/17 0927   BP: (!) 173/84   Pulse: 72   Resp: 16   Temp: 98.4 °F (36.9 °C)   wt 129lbs    GENERAL: Comfortable looking patient. Patient is in no distress.chronically ill appearing  Awake, alert and oriented to time, person and place.  No anxiety, or agitation.      HEENT: Normal conjunctivae and eyelids. WNL.  PERRLA 3 to 4 mm. No icterus, no pallor, no congestion, and no discharge noted.     NECK:   Supple. Trachea is central.  No crepitus.  No JVD or masses.    RESPIRATORY:  No intercostal retractions.  No dullness to percussion.  Chest is clear to auscultation.  No rales, rhonchi or wheezes.  No crepitus.  Good air entry bilaterally.    CARDIOVASCULAR:  S1 and S2 are normally heard without murmurs or gallops.  All peripheral pulses are present.    ABDOMEN:  Normal abdomen.  No hepatosplenomegaly.  No free fluid.  Bowel sounds are present.  No hernia noted. No masses.  No rebound or tenderness.  No guarding or rigidity.  Umbilicus is midline.    LYMPHATICS:  No axillary, cervical, supraclavicular, submental, or inguinal lymphadenopathy.    SKIN/MUSCULOSKELETAL:  There is no evidence of excoriation marks or ecchmosis.  No rashes.  No cyanosis.  No clubbing.  No joint or skeletal deformities noted.  Normal range of motion.    NEUROLOGIC:  Higher functions are appropriate.  No cranial nerve deficits.  Normal fadia.  Normal strength.  Motor and sensory functions are normal.  Deep tendon reflexes are normal.    GENITAL/RECTAL:  Exams are deferred.      Laboratory:     CBC:  Lab Results   Component Value Date    WBC 1.93 (L) 01/12/2017    RBC 2.95 (L) 01/12/2017    HGB 9.1 (L) 01/12/2017    HCT 28.1 (L) 01/12/2017    MCV 95 01/12/2017    MCH 30.8 01/12/2017    MCHC 32.4 01/12/2017    RDW 19.9 (H) 01/12/2017     01/12/2017    MPV 9.4 01/12/2017    GRAN 1.1 (L) 01/12/2017    GRAN 58.5 01/12/2017    LYMPH 0.3 (L) 01/12/2017    LYMPH 14.0 (L) 01/12/2017    MONO 0.4 01/12/2017    MONO 18.7 (H) 01/12/2017    EOS 0.2 01/12/2017    BASO 0.01 01/12/2017    EOSINOPHIL 8.3 (H) 01/12/2017    BASOPHIL 0.5 01/12/2017       BMP: BMP  Lab Results   Component Value Date     01/12/2017    K 3.2 (L) 01/12/2017    CL 95 01/12/2017    CO2 35 (H) 01/12/2017    BUN 9 01/12/2017    CREATININE 1.75 (H) 01/12/2017    CALCIUM 7.9 (L) 01/12/2017    ANIONGAP 6 (L) 01/12/2017    ESTGFRAFRICA 33 (A) 01/12/2017    EGFRNONAA 28 (A)  01/12/2017       LFT:   Lab Results   Component Value Date    ALT 30 01/12/2017    AST 25 01/12/2017    GGT 25 09/02/2014    ALKPHOS 58 01/12/2017    BILITOT 0.7 01/12/2017       Assessment/Plan:       1. Pleural effusion, bilateral    2. Severe anemia    3. Chronic diastolic congestive heart failure    4. Essential hypertension    5. S/P kidney transplant    6. Immunosuppressed status    7. Kidney transplant candidate        Proceed with rituxan for hemolytic anemia.   Could she come off some immunosuppresants now that she has started dialysis   rtc 1 week with cbc, cmp for ongoing rituxan

## 2017-01-12 NOTE — MR AVS SNAPSHOT
Patient Information     Patient Name Sex Dacia Nation Female 1945      Visit Information        Provider Department Dept Phone Center    2017 11:30 AM CHAIR 31, [facility] CHEMO [facility] [phone] [facility] at [facility]      Patient Instructions     None      Your Current Medications Are     acetaminophen-codeine 300-30mg (TYLENOL #3) 300-30 mg Tab    alprazolam (XANAX) 0.25 MG tablet    amlodipine (NORVASC) 5 MG tablet    calcitRIOL (ROCALTROL) 0.5 MCG Cap    calcium carbonate (OS-ALBINA) 600 mg (1,500 mg) Tab    cholestyramine (QUESTRAN) 4 gram packet    fish oil-omega-3 fatty acids 300-1,000 mg capsule    folic acid (FOLVITE) 400 MCG tablet    furosemide (LASIX) 40 MG tablet    guaifenesin-codeine 100-10 mg/5 ml (TUSSI-ORGANIDIN NR)  mg/5 mL syrup    levothyroxine (SYNTHROID) 75 MCG tablet    losartan (COZAAR) 50 MG tablet    magnesium oxide (MAG-OX) 400 mg tablet    mycophenolate (MYFORTIC) 180 MG TbEC    omeprazole (PRILOSEC) 20 MG capsule    phenyleph-min oil-petrolatum (PREPARATION H) 0.25-14-74.9 % Oint    sodium bicarbonate 650 MG tablet    tacrolimus (PROGRAF) 1 MG Cap    zolpidem (AMBIEN) 5 MG Tab    FOLBEE PLUS 5 mg Tab    multivitamin capsule    RITUXIMAB (RITUXAN IV)    umeclidinium-vilanterol (ANORO ELLIPTA) 62.5-25 mcg/actuation DsDv    potassium chloride SA (K-DUR,KLOR-CON) 10 MEQ tablet (Discontinued)      Facility-Administered Medications     acetaminophen tablet 650 mg    diphenhydramine IVPB 25 mg    famotidine IVPB 20 mg    furosemide tablet 20 mg    rituximab (RITUXAN) 630 mg in sodium chloride 0.9% 160 mL infusion (conc: 1 mg/mL) 630 mg    sodium chloride 0.9% 100 mL flush bag    sodium chloride 0.9% flush 10 mL    sodium chloride 0.9% flush 10 mL      Appointments for Next Year     2017  9:00 AM FASTING LAB (15 min.) [facility], [place]    1. Do not eat or drink anything for TEN HOURS (10) PRIOR TO TEST. Do not chew gum or  eat candy mints, even those claiming to be sugar free. Water is allowed but do not drink any other fluids 2. Take your regular daily medicines as your doctor has ordered. If you are diabetic, do not take your insulin or other diabetic medication until your blood is drawn and you are ready to eat. Your physician may have special instructions for diabetics. Check with your doctor if you have any questions.3. Alcoholic beverages are not allowed starting at 6:00pm the evening before your appointment.    1st Floor    1/19/2017  9:30 AM NON FASTING LAB (15 min.) [facility] LAB, [facility] DRAW STATION    Arrive at check-in approximately 15 minutes before your scheduled appointment time. Bring all outside medical records and imaging, along with a list of your current medications and insurance card.    1/19/2017 10:40 AM ESTABLISHED PATIENT (20 min.) [facility] Hematology Kaylan Metz MD    Arrive at check-in approximately 15 minutes before your scheduled appointment time. Bring all outside medical records and imaging, along with a list of your current medications and insurance card.    1/19/2017 11:30 AM INFUSION 300 MIN (300 min.) [facility] CHAIR 02, [facility] CHEMO    Arrive at check-in approximately 15 minutes before your scheduled appointment time. Bring all outside medical records and imaging, along with a list of your current medications and insurance card.    [facility] Floor    1/26/2017 11:30 AM INFUSION 300 MIN (300 min.) [facility] CHAIR 19, STPH [facility] CHEMO    Arrive at check-in approximately 15 minutes before your scheduled appointment time. Bring all outside medical records and imaging, along with a list of your current medications and insurance card.    [facility] Floor    2/20/2017  8:20 AM CONSULT - PULMONARY ([facility]) (40 min.) [facility] - Pulmonary Krish Hilton MD    Arrive at check-in approximately 15 minutes before your scheduled appointment time. Bring all  "outside medical records and imaging, along with a list of your current medications and insurance card.         Default Flowsheet Data (last 24 hours)      Amb Complex Vitals Anoop        01/12/17 1400 01/12/17 1350 01/12/17 1320 01/12/17 0927       Measurements    Weight    57 kg (125 lb 10.6 oz)     Height    5' 5" (1.651 m)     BSA (Calculated - sq m)    1.62 sq meters     BMI (Calculated)    21     BP (!)  172/85 (!)  180/89 (!)  171/83 (!)  173/84     Temp    98.4 °F (36.9 °C)     Pulse 65 63 63 72     Resp 14 14 16 16     Pain Assessment    Pain Score    Zero             Allergies     Flonase [Fluticasone]     Local blisters    Banana Hives      Medications You Received from 01/11/2017 1520 to 01/12/2017 1520        Date/Time Order Dose Route Action     01/12/2017 1144 acetaminophen tablet 650 mg 650 mg Oral Given     01/12/2017 1203 diphenhydramine IVPB 25 mg 25 mg Intravenous New Bag     01/12/2017 1145 famotidine IVPB 20 mg 20 mg Intravenous New Bag     01/12/2017 1246 rituximab (RITUXAN) 630 mg in sodium chloride 0.9% 160 mL infusion (conc: 1 mg/mL) 630 mg 630 mg Intravenous New Bag     01/12/2017 1145 sodium chloride 0.9% 100 mL flush bag   Intravenous New Bag     01/12/2017 1145 sodium chloride 0.9% flush 10 mL 10 mL Intravenous Given      Current Discharge Medication List     Cannot display discharge medications since this is not an admission.      "

## 2017-01-15 ENCOUNTER — NURSE TRIAGE (OUTPATIENT)
Dept: ADMINISTRATIVE | Facility: CLINIC | Age: 72
End: 2017-01-15

## 2017-01-15 NOTE — TELEPHONE ENCOUNTER
Reason for Disposition   Transplant patient (e.g., kidney, liver, lung, heart)    Additional Information   Commented on: Answer Assessment - Initial Assessment Questions     Patient is calling stating that she took her temperature 30 minutes ago and it was 101.2.  Patient also states that she has a cough and her WBC is low.  Patient started dialysis 3 weeks ago.  Advised patient to go to the closes ER.  Patient states that [facility] is the closes, even though she sees [facility] doctors.  Called on call doctor - Dr. Solorzano to notified him that patient is going to the ER for further evaluation.  Please f/u with patient.    Protocols used: ST FEVER-A-AH    Used BPA 24 & 28

## 2017-01-16 RX ORDER — PROMETHAZINE HYDROCHLORIDE 12.5 MG/1
12.5 TABLET ORAL EVERY 6 HOURS PRN
Qty: 45 TABLET | Refills: 1 | Status: SHIPPED | OUTPATIENT
Start: 2017-01-16 | End: 2018-01-01

## 2017-01-16 RX ORDER — CLONIDINE 0.1 MG/24H
1 PATCH, EXTENDED RELEASE TRANSDERMAL
Qty: 4 PATCH | Refills: 11 | Status: SHIPPED | OUTPATIENT
Start: 2017-01-16 | End: 2017-04-11 | Stop reason: SDUPTHER

## 2017-01-19 ENCOUNTER — LAB VISIT (OUTPATIENT)
Dept: LAB | Facility: HOSPITAL | Age: 72
End: 2017-01-19
Attending: INTERNAL MEDICINE
Payer: MEDICARE

## 2017-01-19 ENCOUNTER — OFFICE VISIT (OUTPATIENT)
Dept: HEMATOLOGY/ONCOLOGY | Facility: CLINIC | Age: 72
End: 2017-01-19
Payer: MEDICARE

## 2017-01-19 VITALS
HEIGHT: 65 IN | BODY MASS INDEX: 21.12 KG/M2 | RESPIRATION RATE: 19 BRPM | SYSTOLIC BLOOD PRESSURE: 195 MMHG | WEIGHT: 126.75 LBS | HEART RATE: 73 BPM | DIASTOLIC BLOOD PRESSURE: 93 MMHG

## 2017-01-19 DIAGNOSIS — Z94.4 LIVER TRANSPLANTED: ICD-10-CM

## 2017-01-19 DIAGNOSIS — D59.10 HEMOLYTIC ANEMIA DUE TO ANTIBODY: Primary | ICD-10-CM

## 2017-01-19 DIAGNOSIS — D63.1 ANEMIA IN CKD (CHRONIC KIDNEY DISEASE): ICD-10-CM

## 2017-01-19 DIAGNOSIS — I10 ESSENTIAL HYPERTENSION: ICD-10-CM

## 2017-01-19 DIAGNOSIS — N18.5 CKD (CHRONIC KIDNEY DISEASE) STAGE 5, GFR LESS THAN 15 ML/MIN: ICD-10-CM

## 2017-01-19 DIAGNOSIS — D63.8 ANEMIA OF CHRONIC DISORDER: ICD-10-CM

## 2017-01-19 DIAGNOSIS — N18.9 ANEMIA IN CKD (CHRONIC KIDNEY DISEASE): ICD-10-CM

## 2017-01-19 DIAGNOSIS — K74.3 PBC (PRIMARY BILIARY CIRRHOSIS): ICD-10-CM

## 2017-01-19 LAB
ALBUMIN SERPL BCP-MCNC: 3 G/DL
ALP SERPL-CCNC: 62 U/L
ALT SERPL W/O P-5'-P-CCNC: 30 U/L
ANION GAP SERPL CALC-SCNC: 5 MMOL/L
ANISOCYTOSIS BLD QL SMEAR: ABNORMAL
AST SERPL-CCNC: 37 U/L
BASOPHILS NFR BLD: 0 %
BILIRUB SERPL-MCNC: 0.7 MG/DL
BUN SERPL-MCNC: 6 MG/DL
BURR CELLS BLD QL SMEAR: ABNORMAL
CALCIUM SERPL-MCNC: 8 MG/DL
CHLORIDE SERPL-SCNC: 93 MMOL/L
CO2 SERPL-SCNC: 38 MMOL/L
CREAT SERPL-MCNC: 1.95 MG/DL
DIFFERENTIAL METHOD: ABNORMAL
EOSINOPHIL NFR BLD: 5 %
ERYTHROCYTE [DISTWIDTH] IN BLOOD BY AUTOMATED COUNT: 20.6 %
EST. GFR  (AFRICAN AMERICAN): 29 ML/MIN/1.73 M^2
EST. GFR  (NON AFRICAN AMERICAN): 25 ML/MIN/1.73 M^2
GLUCOSE SERPL-MCNC: 91 MG/DL
HCT VFR BLD AUTO: 28.1 %
HGB BLD-MCNC: 8.7 G/DL
HYPOCHROMIA BLD QL SMEAR: ABNORMAL
LYMPHOCYTES NFR BLD: 8.3 %
MCH RBC QN AUTO: 30.2 PG
MCHC RBC AUTO-ENTMCNC: 31 %
MCV RBC AUTO: 98 FL
MONOCYTES NFR BLD: 15 %
NEUTROPHILS # BLD AUTO: 1 K/UL
NEUTROPHILS NFR BLD: 68.4 %
NEUTS BAND NFR BLD MANUAL: 3.3 %
NRBC BLD-RTO: 0 /100 WBC
OVALOCYTES BLD QL SMEAR: ABNORMAL
PLATELET # BLD AUTO: 210 K/UL
PLATELET BLD QL SMEAR: ABNORMAL
PMV BLD AUTO: 9.3 FL
POIKILOCYTOSIS BLD QL SMEAR: SLIGHT
POLYCHROMASIA BLD QL SMEAR: ABNORMAL
POTASSIUM SERPL-SCNC: 3.4 MMOL/L
PROT SERPL-MCNC: 5.5 G/DL
RBC # BLD AUTO: 2.88 M/UL
SODIUM SERPL-SCNC: 136 MMOL/L
WBC # BLD AUTO: 1.35 K/UL

## 2017-01-19 PROCEDURE — 99999 PR PBB SHADOW E&M-EST. PATIENT-LVL III: CPT | Mod: PBBFAC,,, | Performed by: INTERNAL MEDICINE

## 2017-01-19 PROCEDURE — 1157F ADVNC CARE PLAN IN RCRD: CPT | Mod: S$GLB,,, | Performed by: INTERNAL MEDICINE

## 2017-01-19 PROCEDURE — 85027 COMPLETE CBC AUTOMATED: CPT

## 2017-01-19 PROCEDURE — 99214 OFFICE O/P EST MOD 30 MIN: CPT | Mod: S$GLB,,, | Performed by: INTERNAL MEDICINE

## 2017-01-19 PROCEDURE — 99499 UNLISTED E&M SERVICE: CPT | Mod: S$PBB,,, | Performed by: INTERNAL MEDICINE

## 2017-01-19 PROCEDURE — 1126F AMNT PAIN NOTED NONE PRSNT: CPT | Mod: S$GLB,,, | Performed by: INTERNAL MEDICINE

## 2017-01-19 PROCEDURE — 80053 COMPREHEN METABOLIC PANEL: CPT

## 2017-01-19 PROCEDURE — 1159F MED LIST DOCD IN RCRD: CPT | Mod: S$GLB,,, | Performed by: INTERNAL MEDICINE

## 2017-01-19 PROCEDURE — 85007 BL SMEAR W/DIFF WBC COUNT: CPT

## 2017-01-19 PROCEDURE — 1160F RVW MEDS BY RX/DR IN RCRD: CPT | Mod: S$GLB,,, | Performed by: INTERNAL MEDICINE

## 2017-01-19 RX ORDER — MEGESTROL ACETATE 40 MG/ML
SUSPENSION ORAL
COMMUNITY
Start: 2017-01-10 | End: 2017-03-23

## 2017-01-23 ENCOUNTER — LAB VISIT (OUTPATIENT)
Dept: LAB | Facility: HOSPITAL | Age: 72
End: 2017-01-23
Attending: INTERNAL MEDICINE
Payer: MEDICARE

## 2017-01-23 DIAGNOSIS — Z94.4 LIVER TRANSPLANTED: ICD-10-CM

## 2017-01-23 LAB
ALBUMIN SERPL BCP-MCNC: 2.8 G/DL
ALP SERPL-CCNC: 64 U/L
ALT SERPL W/O P-5'-P-CCNC: 14 U/L
ANION GAP SERPL CALC-SCNC: 8 MMOL/L
ANISOCYTOSIS BLD QL SMEAR: ABNORMAL
AST SERPL-CCNC: 26 U/L
BASOPHILS # BLD AUTO: 0.02 K/UL
BASOPHILS NFR BLD: 1 %
BILIRUB SERPL-MCNC: 0.8 MG/DL
BUN SERPL-MCNC: 13 MG/DL
BURR CELLS BLD QL SMEAR: ABNORMAL
CALCIUM SERPL-MCNC: 8.2 MG/DL
CHLORIDE SERPL-SCNC: 97 MMOL/L
CO2 SERPL-SCNC: 31 MMOL/L
CREAT SERPL-MCNC: 2.8 MG/DL
DIFFERENTIAL METHOD: ABNORMAL
EOSINOPHIL # BLD AUTO: 0.2 K/UL
EOSINOPHIL NFR BLD: 8 %
ERYTHROCYTE [DISTWIDTH] IN BLOOD BY AUTOMATED COUNT: 20.7 %
EST. GFR  (AFRICAN AMERICAN): 18.6 ML/MIN/1.73 M^2
EST. GFR  (NON AFRICAN AMERICAN): 16.1 ML/MIN/1.73 M^2
GLUCOSE SERPL-MCNC: 88 MG/DL
HCT VFR BLD AUTO: 31.7 %
HGB BLD-MCNC: 10.1 G/DL
HYPOCHROMIA BLD QL SMEAR: ABNORMAL
LYMPHOCYTES # BLD AUTO: 0.4 K/UL
LYMPHOCYTES NFR BLD: 20.6 %
MCH RBC QN AUTO: 29.8 PG
MCHC RBC AUTO-ENTMCNC: 31.9 %
MCV RBC AUTO: 94 FL
MONOCYTES # BLD AUTO: 0.3 K/UL
MONOCYTES NFR BLD: 16.1 %
NEUTROPHILS # BLD AUTO: 1.1 K/UL
NEUTROPHILS NFR BLD: 54.3 %
OVALOCYTES BLD QL SMEAR: ABNORMAL
PLATELET # BLD AUTO: 336 K/UL
PLATELET BLD QL SMEAR: ABNORMAL
PMV BLD AUTO: 10.5 FL
POIKILOCYTOSIS BLD QL SMEAR: SLIGHT
POLYCHROMASIA BLD QL SMEAR: ABNORMAL
POTASSIUM SERPL-SCNC: 3.2 MMOL/L
PROT SERPL-MCNC: 5.4 G/DL
RBC # BLD AUTO: 3.39 M/UL
SCHISTOCYTES BLD QL SMEAR: ABNORMAL
SODIUM SERPL-SCNC: 136 MMOL/L
WBC # BLD AUTO: 1.99 K/UL

## 2017-01-23 PROCEDURE — 85025 COMPLETE CBC W/AUTO DIFF WBC: CPT

## 2017-01-23 PROCEDURE — 80197 ASSAY OF TACROLIMUS: CPT

## 2017-01-23 PROCEDURE — 80053 COMPREHEN METABOLIC PANEL: CPT

## 2017-01-24 ENCOUNTER — TELEPHONE (OUTPATIENT)
Dept: TRANSPLANT | Facility: CLINIC | Age: 72
End: 2017-01-24

## 2017-01-24 LAB — TACROLIMUS BLD-MCNC: 2.6 NG/ML

## 2017-01-24 NOTE — TELEPHONE ENCOUNTER
----- Message from Victor Hugo Wise MD sent at 1/24/2017 11:29 AM CST -----  Results reviewed  Kidney function deteriorating. Does she have a nephrologist?

## 2017-01-25 ENCOUNTER — SOCIAL WORK (OUTPATIENT)
Dept: TRANSPLANT | Facility: CLINIC | Age: 72
End: 2017-01-25
Payer: MEDICARE

## 2017-01-25 ENCOUNTER — OFFICE VISIT (OUTPATIENT)
Dept: FAMILY MEDICINE | Facility: CLINIC | Age: 72
End: 2017-01-25
Payer: MEDICARE

## 2017-01-25 ENCOUNTER — TELEPHONE (OUTPATIENT)
Dept: TRANSPLANT | Facility: CLINIC | Age: 72
End: 2017-01-25

## 2017-01-25 ENCOUNTER — TELEPHONE (OUTPATIENT)
Dept: NEPHROLOGY | Facility: CLINIC | Age: 72
End: 2017-01-25

## 2017-01-25 VITALS
DIASTOLIC BLOOD PRESSURE: 86 MMHG | BODY MASS INDEX: 21.11 KG/M2 | TEMPERATURE: 98 F | HEART RATE: 78 BPM | WEIGHT: 126.88 LBS | SYSTOLIC BLOOD PRESSURE: 158 MMHG

## 2017-01-25 DIAGNOSIS — Z94.0 S/P KIDNEY TRANSPLANT: Primary | ICD-10-CM

## 2017-01-25 DIAGNOSIS — I50.32 CHRONIC DIASTOLIC CONGESTIVE HEART FAILURE: ICD-10-CM

## 2017-01-25 DIAGNOSIS — N18.5 CKD (CHRONIC KIDNEY DISEASE) STAGE 5, GFR LESS THAN 15 ML/MIN: ICD-10-CM

## 2017-01-25 DIAGNOSIS — R05.3 CHRONIC COUGH: ICD-10-CM

## 2017-01-25 DIAGNOSIS — I77.9 RIGHT-SIDED CAROTID ARTERY DISEASE: ICD-10-CM

## 2017-01-25 DIAGNOSIS — D64.9 ANEMIA, UNSPECIFIED: ICD-10-CM

## 2017-01-25 DIAGNOSIS — D64.9 ANEMIA, UNSPECIFIED TYPE: ICD-10-CM

## 2017-01-25 PROBLEM — J90 PLEURAL EFFUSION, BILATERAL: Status: RESOLVED | Noted: 2017-01-03 | Resolved: 2017-01-25

## 2017-01-25 PROBLEM — R60.0 LEG EDEMA, LEFT: Status: RESOLVED | Noted: 2017-01-03 | Resolved: 2017-01-25

## 2017-01-25 LAB
CYTOMEGALOVIRUS DNA: NOT DETECTED
CYTOMEGALOVIRUS LOG (IU/ML): <2.4 LOG (10) COPIES/ML
CYTOMEGALOVIRUS PCR, QUANT: <250 COPIES/ML
CYTOMEGALOVIRUS SOURCE: NORMAL

## 2017-01-25 PROCEDURE — 99397 PER PM REEVAL EST PAT 65+ YR: CPT | Mod: S$GLB,,, | Performed by: FAMILY MEDICINE

## 2017-01-25 PROCEDURE — 99499 UNLISTED E&M SERVICE: CPT | Mod: S$PBB,,, | Performed by: FAMILY MEDICINE

## 2017-01-25 PROCEDURE — 99999 PR PBB SHADOW E&M-EST. PATIENT-LVL II: CPT | Mod: PBBFAC,,, | Performed by: FAMILY MEDICINE

## 2017-01-25 RX ORDER — BENZONATATE 100 MG/1
100 CAPSULE ORAL 3 TIMES DAILY PRN
Qty: 30 CAPSULE | Refills: 2 | Status: SHIPPED | OUTPATIENT
Start: 2017-01-25 | End: 2017-02-24

## 2017-01-25 RX ORDER — FUROSEMIDE 20 MG/1
40 TABLET ORAL DAILY
Qty: 60 TABLET | Refills: 11
Start: 2017-01-25 | End: 2017-02-06 | Stop reason: SDUPTHER

## 2017-01-25 RX ORDER — LEVOTHYROXINE SODIUM 75 UG/1
TABLET ORAL
Qty: 90 TABLET | Refills: 3 | Status: SHIPPED | OUTPATIENT
Start: 2017-01-25 | End: 2017-02-01 | Stop reason: CLARIF

## 2017-01-25 RX ORDER — HYDROCODONE BITARTRATE AND HOMATROPINE METHYLBROMIDE ORAL SOLUTION 5; 1.5 MG/5ML; MG/5ML
5 LIQUID ORAL EVERY 8 HOURS PRN
Qty: 120 ML | Refills: 0 | Status: SHIPPED | OUTPATIENT
Start: 2017-01-25 | End: 2017-02-04

## 2017-01-25 NOTE — TELEPHONE ENCOUNTER
----- Message from Vinicius Rodriguez sent at 1/25/2017 12:25 PM CST -----  Contact: Sobeida/[facility]  Sobeida called in regarding the attached patient and would like a call back regarding the [facility] patient is being sent to.    Sobeida's call back number is [phone]

## 2017-01-25 NOTE — LETTER
January 25, 2017    Dacia Hamilton  [address] [place] [address]          Dear Dacia Hamilton:  MRN: [id number]    Your lab results were stable.  There are no medicine changes.  Please have your labs drawn again on 2/27/17.      If you cannot have your labs drawn on the scheduled date, it is your responsibility to call the transplant department to reschedule.  To reschedule or make an appointment, please as to speak to or leave a message for my assistant, Kae Hart, at [phone].  When leaving a message for Hailey Pal, or myself, we ask that you leave a brief message regarding your request.    Sincerely,    Lorin Mejia, RN, BSN  Liver Transplant Coordinator  [facility]  [address] [place] [id number] [phone]

## 2017-01-25 NOTE — MR AVS SNAPSHOT
[facility]  [address]  [place] [address]  Phone: [phone]  Fax: [phone]                  Dacia Hamilton   2017 7:30 AM   Office Visit    Description:  Female : 1945   Provider:  David Woods MD   Department:  [facility]           Diagnoses this Visit        Comments    S/P kidney transplant    -  Primary     Anemia, unspecified         Anemia, unspecified type         CKD (chronic kidney disease) stage 5, GFR less than 15 ml/min         Chronic cough         Chronic diastolic congestive heart failure         Right-sided carotid artery disease                To Do List           Future Appointments        Provider Department Dept Phone    2017 9:45 AM LAB, [facility] DRAW STATION [facility] - Laboratory [phone]    2017 10:40 AM Kaylan Metz MD [facility] - Hematology [phone]    2017 11:30 AM CHAIR 19, ST [facility] CHEMO [facility] [phone]    2017 12:30 PM [facility] US1 [facility] [phone]    2017 9:30 AM CHAIR 26, [facility] [phone]      Your Future Surgeries/Procedures     2017   Surgery with Justin Fermin MD   [facility]    [address] [facility] [address]   [phone]              Goals (5 Years of Data)     None       These Medications        Disp Refills Start End    furosemide (LASIX) 20 MG tablet 60 tablet 11 2017    Take 2 tablets (40 mg total) by mouth once daily. - Oral    Pharmacy: [facility] [id number] [place] [address] Ph #: [phone]       benzonatate (TESSALON) 100 MG capsule 30 capsule 2 2017    Take 1 capsule (100 mg total) by mouth 3 (three) times daily as needed for Cough. - Oral    Pharmacy: [facility] [id number] [place] [address] Ph #: [phone]        hydrocodone-homatropine 5-1.5 mg/5 ml (HYCODAN) 5-1.5 mg/5 mL Syrp 120 mL 0 1/25/2017 2/4/2017    Take 5 mLs by mouth every 8 (eight) hours as needed. - Oral    Pharmacy: [facility] [address] [place] [address] #: [phone]         [facility] On Call     [facility] On Call Nurse Care Line - 24/7 Assistance  Registered nurses in the [facility] provide clinical advisement, health education, appointment booking, and other advisory services.  Call for this free service at [phone].             Medications           Message regarding Medications     Verify the changes and/or additions to your medication regime listed below are the same as discussed with your clinician today.  If any of these changes or additions are incorrect, please notify your healthcare provider.        START taking these NEW medications        Refills    furosemide (LASIX) 20 MG tablet 11    Sig: Take 2 tablets (40 mg total) by mouth once daily.    Class: No Print    Route: Oral    benzonatate (TESSALON) 100 MG capsule 2    Sig: Take 1 capsule (100 mg total) by mouth 3 (three) times daily as needed for Cough.    Class: Normal    Route: Oral    hydrocodone-homatropine 5-1.5 mg/5 ml (HYCODAN) 5-1.5 mg/5 mL Syrp 0    Sig: Take 5 mLs by mouth every 8 (eight) hours as needed.    Class: Normal    Route: Oral      STOP taking these medications     umeclidinium-vilanterol (ANORO ELLIPTA) 62.5-25 mcg/actuation DsDv Inhale 1 puff into the lungs once daily.    guaifenesin-codeine 100-10 mg/5 ml (TUSSI-ORGANIDIN NR)  mg/5 mL syrup Take 5 mLs by mouth 3 (three) times daily as needed for Cough.           Verify that the below list of medications is an accurate representation of the medications you are currently taking.  If none reported, the list may be blank. If incorrect, please contact your healthcare provider. Carry this list with you in case of emergency.           Current Medications     alprazolam (XANAX) 0.25 MG tablet  One po prn anxiety not to exceed one per day    amlodipine (NORVASC) 5 MG tablet Take 1 tablet (5 mg total) by mouth 2 (two) times daily.    benzonatate (TESSALON) 100 MG capsule Take 1 capsule (100 mg total) by mouth 3 (three) times daily as needed for Cough.    calcitRIOL (ROCALTROL) 0.5 MCG Cap Take 1 capsule (0.5 mcg total) by mouth once daily.    calcium carbonate (OS-ALBINA) 600 mg (1,500 mg) Tab Take 600 mg by mouth 2 (two) times daily with meals.    cholestyramine (QUESTRAN) 4 gram packet Take 1 packet (4 g total) by mouth 2 (two) times daily.    cloNIDine 0.1 mg/24 hr td ptwk (CATAPRES) 0.1 mg/24 hr Place 1 patch onto the skin every 7 days.    fish oil-omega-3 fatty acids 300-1,000 mg capsule Take 2 g by mouth once daily.      FOLBEE PLUS 5 mg Tab Take 1 tablet by mouth every evening.    folic acid (FOLVITE) 400 MCG tablet Take 800 mcg by mouth once daily.    furosemide (LASIX) 20 MG tablet Take 2 tablets (40 mg total) by mouth once daily.    hydrocodone-homatropine 5-1.5 mg/5 ml (HYCODAN) 5-1.5 mg/5 mL Syrp Take 5 mLs by mouth every 8 (eight) hours as needed.    levothyroxine (SYNTHROID) 75 MCG tablet Take 1 tablet (75 mcg total) by mouth before breakfast. Every morning    losartan (COZAAR) 50 MG tablet One po at noon    magnesium oxide (MAG-OX) 400 mg tablet Take 1 tablet (400 mg total) by mouth 3 (three) times daily.    megestrol (MEGACE) 400 mg/10 mL (40 mg/mL) Susp     multivitamin capsule Every morning    omeprazole (PRILOSEC) 20 MG capsule Take 1 capsule (20 mg total) by mouth once daily. Every day    phenyleph-min oil-petrolatum (PREPARATION H) 0.25-14-74.9 % Oint Place 1 applicator rectally 4 (four) times daily as needed.    promethazine (PHENERGAN) 12.5 MG Tab Take 1 tablet (12.5 mg total) by mouth every 6 (six) hours as needed (nausea). May make you sleep    RITUXIMAB (RITUXAN IV) Inject into the vein. Administered at [facility]    sodium bicarbonate 650 MG tablet Take 3 tablets (1,950 mg  total) by mouth 2 (two) times daily.    tacrolimus (PROGRAF) 1 MG Cap Take 1 capsule (1 mg total) by mouth every 12 (twelve) hours.    zolpidem (AMBIEN) 5 MG Tab Take 1 tablet (5 mg total) by mouth nightly as needed (insomnia). At bedtime           Clinical Reference Information           Vital Signs - Last Recorded  Most recent update: 1/25/2017  7:44 AM by David Woods MD    BP Pulse Temp Wt BMI    (!) 158/86 78 97.5 °F (36.4 °C) 57.6 kg (126 lb 14 oz) 21.11 kg/m2      Blood Pressure          Most Recent Value    BP  (!)  158/86      Allergies as of 1/25/2017     Flonase [Fluticasone]    Banana      Immunizations Administered on Date of Encounter - 1/25/2017     None      Orders Placed During Today's Visit     Future Labs/Procedures Expected by Expires    US Carotid Bilateral  1/25/2017 1/25/2018      Maintenance Dialysis History     Patient has no recorded history of maintenance dialysis.      Transplant Information        Txp Date Organ Coordinator Care Team     Kidney Vianca Brambila Referring Physician:  Jose Alfredo Godfrey MD   Current Nephrologist:  Jose Alfredo Godfrey MD          Txp Date Organ Coordinator Care Team    6/4/2006 Liver Lorin Mejia RN Surgeon:  Brendan Amor MD   Assisting Surgeon:  Victor Hugo Mcintosh Jr., MD   Referring Physician:  Syeda Tong MD   Current Hepatologist:  Victor Hugo Wise MD   Corresponding Physician:  David Woods MD   Current PCP:  David Woods MD          Txp Date Organ Coordinator Care Team    6/4/2006 Kidney Lorin Mejia RN Surgeon:  Brendan Amor MD   Referring Physician:  Syeda Tong MD

## 2017-01-25 NOTE — PROGRESS NOTES
Subjective:       Patient ID: Dacia Hamilton is a 71 y.o. female.    Chief Complaint: Annual exam.   HPI Comments: She has had a busy year. She is here with her dtr.  Now on dialysis for renal failure. Previous kidney transplant has been rejected. She ballooned up with 30# of fluid with leg swelling and dyspnea and orthopnea. She has had transfusions for anemia. That seems to help her dyspnea sometimes. Currently able to walk most places. PFT showed good airflow but decreased diffusion capacity.  Cough is a problem. Tussionex helps some but causes nausea. Occ productive. BP control has been a problem. Clonidine patch started this week and seems to be helping. She is just recovering from the flu. Just started Megace for appetite.  Maybe a little better yesterday.      Review of Systems   Constitutional: Positive for appetite change, fatigue, fever (3 days with flu) and unexpected weight change.   Respiratory: Positive for cough and shortness of breath.    Cardiovascular: Positive for leg swelling (Markedly improved). Negative for chest pain and palpitations.   Gastrointestinal: Positive for diarrhea (she gets diarrhea once or twice a week.  She had a workup for this a year ago.  She is not currently taking cholestyramine.). Negative for abdominal pain and constipation.       Objective:     Blood pressure (!) 158/86, pulse 78, temperature 97.5 °F (36.4 °C), weight 57.6 kg (126 lb 14 oz).      Physical Exam   Constitutional:   She is thin alert and in no distress.   Eyes: Conjunctivae are normal.   Neck: No thyromegaly present.   Cardiovascular: Normal rate and intact distal pulses.    Murmur heard.  No JVD and no carotid bruits.  Her right carotid has a very visible pulse a ration that is either a tortuosity or aneurysm.   Pulmonary/Chest: Effort normal.   She has slightly decreased breath sounds at the bases.  No crackles   Abdominal: Soft. Bowel sounds are normal. She exhibits no distension. There is no tenderness.    Musculoskeletal: She exhibits no edema.   Lymphadenopathy:     She has no cervical adenopathy.   Neurological: She is alert.   Psychiatric: She has a normal mood and affect.       Assessment:       1. S/P kidney transplant        3. Anemia, unspecified type    4. CKD (chronic kidney disease) stage 5, GFR less than 15 ml/min    5. Chronic cough    6. Chronic diastolic congestive heart failure    7. Right-sided carotid artery disease        Plan:       Carotid ultrasound.  Continue current medications.  I reviewed recent lab work.  Trial of Tessalon or Hycodan for her cough.  She has an upcoming appointment for shunt placement for ongoing dialysis.

## 2017-01-25 NOTE — PROGRESS NOTES
1-25-17 Dialysis compliance update.    [facility], [phone] spoke with Urvashi DENTON. MWF morning session, 3 hours 45 mins.    Dialysis nurse Urvashi reports pt has high compliance with all dialysis sessions and instructions. Dialysis nurse reports pt has 0 AMAs, 0 missed appointments and 0 psychosocial issues. Reports only part of lab work that is problematic is her hemoglobin and that issue is being addressed by hematologist. Reports belief pt is a good candidate for organ transplant.    Psychosocial Suitability: Patient presents as a suitable candidate for kidney transplant at this time. Based on psychosocial risk factors, patient presents as low risk, due to pt reporting having organ transplant caregiver/transportation plan, medical insurance plan and plan to afford transplant costs all in place.     [facility] Ortega [facility]W [facility]

## 2017-01-26 ENCOUNTER — OFFICE VISIT (OUTPATIENT)
Dept: HEMATOLOGY/ONCOLOGY | Facility: CLINIC | Age: 72
End: 2017-01-26
Payer: MEDICARE

## 2017-01-26 ENCOUNTER — INFUSION (OUTPATIENT)
Dept: INFUSION THERAPY | Facility: HOSPITAL | Age: 72
End: 2017-01-26
Attending: INTERNAL MEDICINE
Payer: MEDICARE

## 2017-01-26 VITALS
HEIGHT: 65 IN | BODY MASS INDEX: 21.12 KG/M2 | SYSTOLIC BLOOD PRESSURE: 180 MMHG | HEART RATE: 69 BPM | DIASTOLIC BLOOD PRESSURE: 88 MMHG | RESPIRATION RATE: 19 BRPM | WEIGHT: 126.75 LBS

## 2017-01-26 VITALS
SYSTOLIC BLOOD PRESSURE: 170 MMHG | RESPIRATION RATE: 18 BRPM | TEMPERATURE: 98 F | HEART RATE: 65 BPM | DIASTOLIC BLOOD PRESSURE: 89 MMHG

## 2017-01-26 DIAGNOSIS — N18.9 ANEMIA IN CKD (CHRONIC KIDNEY DISEASE): Primary | ICD-10-CM

## 2017-01-26 DIAGNOSIS — D70.2 NEUTROPENIA, DRUG-INDUCED: ICD-10-CM

## 2017-01-26 DIAGNOSIS — D63.1 ANEMIA IN CKD (CHRONIC KIDNEY DISEASE): Primary | ICD-10-CM

## 2017-01-26 DIAGNOSIS — D64.9 SEVERE ANEMIA: Primary | ICD-10-CM

## 2017-01-26 DIAGNOSIS — D64.9 SEVERE ANEMIA: ICD-10-CM

## 2017-01-26 DIAGNOSIS — N18.5 CKD (CHRONIC KIDNEY DISEASE) STAGE 5, GFR LESS THAN 15 ML/MIN: ICD-10-CM

## 2017-01-26 DIAGNOSIS — T86.11 ACUTE REJECTION OF KIDNEY TRANSPLANT: ICD-10-CM

## 2017-01-26 DIAGNOSIS — N17.8 ACUTE RENAL FAILURE WITH PATHOLOGICAL LESION IN KIDNEY: ICD-10-CM

## 2017-01-26 DIAGNOSIS — I15.9 SECONDARY HYPERTENSION: ICD-10-CM

## 2017-01-26 DIAGNOSIS — Z94.0 S/P KIDNEY TRANSPLANT: ICD-10-CM

## 2017-01-26 DIAGNOSIS — K74.3 PBC (PRIMARY BILIARY CIRRHOSIS): ICD-10-CM

## 2017-01-26 DIAGNOSIS — Z94.4 LIVER TRANSPLANTED: ICD-10-CM

## 2017-01-26 PROCEDURE — 1126F AMNT PAIN NOTED NONE PRSNT: CPT | Mod: S$GLB,,, | Performed by: INTERNAL MEDICINE

## 2017-01-26 PROCEDURE — 25000003 PHARM REV CODE 250: Mod: PN | Performed by: INTERNAL MEDICINE

## 2017-01-26 PROCEDURE — 99999 PR PBB SHADOW E&M-EST. PATIENT-LVL III: CPT | Mod: PBBFAC,,, | Performed by: INTERNAL MEDICINE

## 2017-01-26 PROCEDURE — 96366 THER/PROPH/DIAG IV INF ADDON: CPT | Mod: PN

## 2017-01-26 PROCEDURE — 96415 CHEMO IV INFUSION ADDL HR: CPT | Mod: PN

## 2017-01-26 PROCEDURE — 99499 UNLISTED E&M SERVICE: CPT | Mod: S$PBB,,, | Performed by: INTERNAL MEDICINE

## 2017-01-26 PROCEDURE — 99215 OFFICE O/P EST HI 40 MIN: CPT | Mod: S$GLB,,, | Performed by: INTERNAL MEDICINE

## 2017-01-26 PROCEDURE — 96368 THER/DIAG CONCURRENT INF: CPT | Mod: PN

## 2017-01-26 PROCEDURE — 96413 CHEMO IV INFUSION 1 HR: CPT | Mod: PN

## 2017-01-26 PROCEDURE — 1157F ADVNC CARE PLAN IN RCRD: CPT | Mod: S$GLB,,, | Performed by: INTERNAL MEDICINE

## 2017-01-26 PROCEDURE — 1159F MED LIST DOCD IN RCRD: CPT | Mod: S$GLB,,, | Performed by: INTERNAL MEDICINE

## 2017-01-26 PROCEDURE — 96367 TX/PROPH/DG ADDL SEQ IV INF: CPT | Mod: PN

## 2017-01-26 PROCEDURE — 1160F RVW MEDS BY RX/DR IN RCRD: CPT | Mod: S$GLB,,, | Performed by: INTERNAL MEDICINE

## 2017-01-26 PROCEDURE — 63600175 PHARM REV CODE 636 W HCPCS: Mod: PN | Performed by: INTERNAL MEDICINE

## 2017-01-26 RX ORDER — SODIUM CHLORIDE 0.9 % (FLUSH) 0.9 %
10 SYRINGE (ML) INJECTION
Status: DISCONTINUED | OUTPATIENT
Start: 2017-01-26 | End: 2017-01-26 | Stop reason: HOSPADM

## 2017-01-26 RX ORDER — FAMOTIDINE 20 MG/50ML
20 INJECTION, SOLUTION INTRAVENOUS
Status: COMPLETED | OUTPATIENT
Start: 2017-01-26 | End: 2017-01-26

## 2017-01-26 RX ORDER — HEPARIN 100 UNIT/ML
500 SYRINGE INTRAVENOUS
Status: DISCONTINUED | OUTPATIENT
Start: 2017-01-26 | End: 2017-01-26 | Stop reason: HOSPADM

## 2017-01-26 RX ORDER — MEPERIDINE HYDROCHLORIDE 25 MG/ML
25 INJECTION INTRAMUSCULAR; INTRAVENOUS; SUBCUTANEOUS
Status: CANCELLED | OUTPATIENT
Start: 2017-01-26 | End: 2017-01-26

## 2017-01-26 RX ORDER — HEPARIN 100 UNIT/ML
500 SYRINGE INTRAVENOUS
Status: CANCELLED | OUTPATIENT
Start: 2017-01-26

## 2017-01-26 RX ORDER — ACETAMINOPHEN 325 MG/1
650 TABLET ORAL
Status: CANCELLED | OUTPATIENT
Start: 2017-01-26

## 2017-01-26 RX ORDER — ACETAMINOPHEN 325 MG/1
650 TABLET ORAL
Status: COMPLETED | OUTPATIENT
Start: 2017-01-26 | End: 2017-01-26

## 2017-01-26 RX ORDER — FAMOTIDINE 20 MG/50ML
20 INJECTION, SOLUTION INTRAVENOUS
Status: CANCELLED | OUTPATIENT
Start: 2017-01-26 | End: 2017-01-26

## 2017-01-26 RX ORDER — SODIUM CHLORIDE 0.9 % (FLUSH) 0.9 %
10 SYRINGE (ML) INJECTION
Status: CANCELLED | OUTPATIENT
Start: 2017-01-26

## 2017-01-26 RX ADMIN — POTASSIUM CHLORIDE: 2 INJECTION, SOLUTION, CONCENTRATE INTRAVENOUS at 03:01

## 2017-01-26 RX ADMIN — ACETAMINOPHEN 650 MG: 325 TABLET ORAL at 12:01

## 2017-01-26 RX ADMIN — FAMOTIDINE 20 MG: 20 INJECTION, SOLUTION INTRAVENOUS at 12:01

## 2017-01-26 RX ADMIN — DIPHENHYDRAMINE HYDROCHLORIDE 25 MG: 50 INJECTION, SOLUTION INTRAMUSCULAR; INTRAVENOUS at 12:01

## 2017-01-26 RX ADMIN — RITUXIMAB 630 MG: 10 INJECTION, SOLUTION INTRAVENOUS at 01:01

## 2017-01-26 RX ADMIN — SODIUM CHLORIDE: 0.9 INJECTION, SOLUTION INTRAVENOUS at 12:01

## 2017-01-26 NOTE — PLAN OF CARE
Problem: Patient Care Overview (Adult)  Goal: Plan of Care Review  Outcome: Ongoing (interventions implemented as appropriate)  Tolerated chemo infusion without any difficulty; RTC in 2 weeks for next tx; Amb off unit independently

## 2017-01-27 ENCOUNTER — COMMITTEE REVIEW (OUTPATIENT)
Dept: TRANSPLANT | Facility: CLINIC | Age: 72
End: 2017-01-27

## 2017-01-27 NOTE — PROGRESS NOTES
Subjective:       Patient ID: Dacia Hamilton is a 71 y.o. female.    Chief Complaint: rituxan  HPI:   oN Rituxan maintenance for hemolytic anemia. wentr to ED dx with the Flu and on Tamiflu since last week.   Receiving dialysis , taken off myfortil for renal immunosuppression to help alleviate anemia some. Was delayed by 1 week due to flu, here for rx today  REVIEW OF SYSTEMS:     No issues on 14 point review today    PHYSICAL EXAM:     Vitals:    01/26/17 1046   BP: (!) 180/88   Pulse: 69   Resp: 19       GENERAL: Comfortable looking patient. Patient is in no distress.  Awake, alert and oriented to time, person and place.  No anxiety, or agitation.      HEENT: Normal conjunctivae and eyelids. WNL.  PERRLA 3 to 4 mm. No icterus, no pallor, no congestion, and no discharge noted.     NECK:  Supple. Trachea is central.  No crepitus.  No JVD or masses.    RESPIRATORY:  No intercostal retractions.  No dullness to percussion.  Chest is clear to auscultation.  No rales, rhonchi or wheezes.  No crepitus.  Good air entry bilaterally.    CARDIOVASCULAR:  S1 and S2 are normally heard without murmurs or gallops.  All peripheral pulses are present.    ABDOMEN:  Normal abdomen.  No hepatosplenomegaly.  No free fluid.  Bowel sounds are present.  No hernia noted. No masses.  No rebound or tenderness.  No guarding or rigidity.  Umbilicus is midline.    LYMPHATICS:  No axillary, cervical, supraclavicular, submental, or inguinal lymphadenopathy.    SKIN/MUSCULOSKELETAL:  There is no evidence of excoriation marks or ecchmosis.  No rashes.  No cyanosis.  No clubbing.  No joint or skeletal deformities noted.  Normal range of motion.    NEUROLOGIC:  Higher functions are appropriate.  No cranial nerve deficits.  Normal fadia.  Normal strength.  Motor and sensory functions are normal.  Deep tendon reflexes are normal.    GENITAL/RECTAL:  Exams are deferred.      Laboratory:     CBC:  Lab Results   Component Value Date    WBC 2.26 (L)  01/26/2017    RBC 3.14 (L) 01/26/2017    HGB 9.7 (L) 01/26/2017    HCT 30.8 (L) 01/26/2017    MCV 98 01/26/2017    MCH 30.9 01/26/2017    MCHC 31.5 (L) 01/26/2017    RDW 20.5 (H) 01/26/2017     01/26/2017    MPV 9.9 01/26/2017    GRAN 1.1 (L) 01/26/2017    GRAN 47.4 01/26/2017    LYMPH 0.5 (L) 01/26/2017    LYMPH 23.0 01/26/2017    MONO 0.5 01/26/2017    MONO 21.7 (H) 01/26/2017    EOS 0.2 01/26/2017    BASO 0.01 01/26/2017    EOSINOPHIL 7.5 01/26/2017    BASOPHIL 0.4 01/26/2017       BMP: BMP  Lab Results   Component Value Date     01/26/2017    K 2.9 (L) 01/26/2017    CL 94 (L) 01/26/2017    CO2 36 (H) 01/26/2017    BUN 13 01/26/2017    CREATININE 1.73 (H) 01/26/2017    CALCIUM 8.2 (L) 01/26/2017    ANIONGAP 6 (L) 01/26/2017    ESTGFRAFRICA 33 (A) 01/26/2017    EGFRNONAA 29 (A) 01/26/2017       LFT:   Lab Results   Component Value Date    ALT 27 01/26/2017    AST 27 01/26/2017    GGT 25 09/02/2014    ALKPHOS 59 01/26/2017    BILITOT 0.7 01/26/2017         Assessment/Plan:       1. Anemia in CKD (chronic kidney disease)    2. Severe anemia    3. Secondary hypertension    4. CKD (chronic kidney disease) stage 5, GFR less than 15 ml/min    5. 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 Neutropenia, drug-induced    7. Liver transplanted    8. PBC (primary biliary cirrhosis)    9. S/P kidney transplant        Getting AV access placed next week for dialysis.   Receiving epogen during dialysis  Ok for rituxan today. Skip next week and rtc 2week after for rituxan, may taper off to q 2wks and then monthly if hgb holds

## 2017-01-27 NOTE — LETTER
January 27, 2017    Dacia Hamilton  [address] [place] [address]    Dear Daciajewell Singletonsontiffanie:  MRN: [id number]    It is the duty of the [facility] Selection [facility] to determine which patients are candidates for a transplant. For this reason, our committee has the difficult task of evaluating patients to determine which ones have the greatest chance of having a successful transplant. We are aware of the magnitude of this responsibility, and we approach it with reverence and humility.    It is with regret I inform you that you are not approved as a transplant candidate due to high risk for surgery (re-transplantation) due to hemolytic anemia requiring frequent blood transfusions, long term use of immunosuppressive medications, and high sensitivity resulting in prolonged wait time of the list in combination with advanced age that will increase risk of complications post op. Based on this review, we have determined that at this time, you are not a candidate for a transplant at [facility].      The selection committee carefully considers each patient's transplant candidacy and determines whether it is safe to proceed with transplantation on a case-by-case basis using established selection criteria.  At present, the risk of proceeding with an elective transplant surgery has become too high.                                                                               Although the selection committee believes you are not a suitable transplant candidate, you have the option to be evaluated at other transplant centers who may have different selection criteria.  You may request your [facility] records be sent to any center of your choice by contacting our Medical Records Department at [phone].                                                                               Attached is a letter from the United Network for Organ Sharing (UNOS).  It describes the services and information offered to patients by UNOS  and the Organ Procurement and Transplant Network.    The [facility] sincerely wishes you the best and remains available to answer any questions.  Please do not hesitate to contact our pre-transplant office if we can assist you in any other way.                                                                               Sincerely,      Ashlee Blackmon MD  Medical Director, Kidney & Kidney/Pancreas Transplantation    Cc: Dr. Godfrey/[facility]    Encl: UNOS Letter          OPTN/UNOS: Your Resource for Organ Transplant Information        If you have a question regarding your own medical care, you always should call your transplant center first. However, for general organ transplant-related information, you can call the [employer] for Organ Sharing (UNOS) toll-free patient services line at 1-162.641.7579.    Anyone, including potential transplant candidates, recipients, family members/friends, living donors, and/or donor family members can call this number to:    · talk about organ donation, living donation, how transplant and donation work, the donation process, transplant policies, and transplant/donor information;  · get a free patient information kit with helpful booklets, waiting list and transplant information, and a list of all transplant centers;  · ask questions about the Organ Procurement and Transplantation Network (OPTN) web site (www.optn.transplant.hrsa.gov); the UNOS Web site (www.unos.org); or the UNOS web site for living donors and transplant recipients (www.transplantliving.org);  · learn how UNOS and the OPTN can help you;  · talk about any concerns that you may have with a transplant center and how they perform    UNOS is a not-for-profit organization that provides all of the administrative services for the national OPTN under federal contract to the Health Resources and Services Administration (HRSA), an agency under the U.S. Department of  Health and Human Services ([facility]).     UNOS and OPTN responsibilities include:    · writing educational material for patients, the public and professionals;  · helping to make people aware of the need for donated organs and tissue;  · writing organ transplant policy with help from doctors, nurses, transplant patients/candidates, donor families, living donors, and the public;  · coordinating the organ matching and placement process;  · collecting information about every organ transplant and donation that occurs in the [place].    Remember, you should contact your transplant center directly if you have questions or concerns about your own medical care including medical records, work-up progress and test reports. [facility] is not your transplant center, and staff at [facility] will not be able to transfer you to your transplant center, so keep your transplant centers phone number handy. But, while you research your transplant needs and learn as much as you can about transplantation and donation, we welcome your call to our toll-free patient services line at [phone].

## 2017-01-27 NOTE — COMMITTEE REVIEW
Native Organ Dx: Other, Specify - renal insuffiency      Not approved for LRD/CAD transplant due to high risk for re- transplant due to hemolytic anemia and long term immunosuppression (since 1991). Active hemolysis requiring frequent blood transfusions and recommendation for Hem/Oc to reduce immunos (re-transplanting kidney would require increasing immunos). Pt is 71 with PRA of 98%. Potential increased wait time on the list. Pt should f/u on R tonsillar lymph node noted on ID exam.    Note written by Vianca Brambila RN    ===============================================    I was present at the meeting and attest to the decision of the committee.    Lea Hoang MD

## 2017-01-31 ENCOUNTER — HOSPITAL ENCOUNTER (OUTPATIENT)
Dept: RADIOLOGY | Facility: HOSPITAL | Age: 72
Discharge: HOME | End: 2017-01-31
Attending: FAMILY MEDICINE
Payer: MEDICARE

## 2017-01-31 DIAGNOSIS — I77.9 RIGHT-SIDED CAROTID ARTERY DISEASE: ICD-10-CM

## 2017-01-31 PROCEDURE — 93880 EXTRACRANIAL BILAT STUDY: CPT | Mod: TC,PO

## 2017-01-31 PROCEDURE — 93880 EXTRACRANIAL BILAT STUDY: CPT | Mod: 26,,, | Performed by: RADIOLOGY

## 2017-02-02 PROBLEM — N18.6 END STAGE RENAL DISEASE: Status: ACTIVE | Noted: 2017-02-02

## 2017-02-06 ENCOUNTER — PATIENT MESSAGE (OUTPATIENT)
Dept: FAMILY MEDICINE | Facility: CLINIC | Age: 72
End: 2017-02-06

## 2017-02-06 ENCOUNTER — PATIENT MESSAGE (OUTPATIENT)
Dept: TRANSPLANT | Facility: CLINIC | Age: 72
End: 2017-02-06

## 2017-02-06 ENCOUNTER — PATIENT MESSAGE (OUTPATIENT)
Dept: NEPHROLOGY | Facility: CLINIC | Age: 72
End: 2017-02-06

## 2017-02-06 RX ORDER — FUROSEMIDE 80 MG/1
80 TABLET ORAL DAILY
Qty: 30 TABLET | Refills: 6
Start: 2017-02-06 | End: 2017-02-13 | Stop reason: SDUPTHER

## 2017-02-09 ENCOUNTER — OFFICE VISIT (OUTPATIENT)
Dept: HEMATOLOGY/ONCOLOGY | Facility: CLINIC | Age: 72
End: 2017-02-09
Payer: MEDICARE

## 2017-02-09 ENCOUNTER — INFUSION (OUTPATIENT)
Dept: INFUSION THERAPY | Facility: HOSPITAL | Age: 72
End: 2017-02-09
Attending: INTERNAL MEDICINE
Payer: MEDICARE

## 2017-02-09 ENCOUNTER — LAB VISIT (OUTPATIENT)
Dept: LAB | Facility: HOSPITAL | Age: 72
End: 2017-02-09
Attending: INTERNAL MEDICINE
Payer: MEDICARE

## 2017-02-09 VITALS
HEART RATE: 70 BPM | BODY MASS INDEX: 21.26 KG/M2 | SYSTOLIC BLOOD PRESSURE: 180 MMHG | WEIGHT: 127.63 LBS | DIASTOLIC BLOOD PRESSURE: 70 MMHG | RESPIRATION RATE: 19 BRPM | HEIGHT: 65 IN

## 2017-02-09 VITALS — HEART RATE: 60 BPM | DIASTOLIC BLOOD PRESSURE: 74 MMHG | SYSTOLIC BLOOD PRESSURE: 145 MMHG

## 2017-02-09 DIAGNOSIS — N18.9 ANEMIA IN CKD (CHRONIC KIDNEY DISEASE): ICD-10-CM

## 2017-02-09 DIAGNOSIS — D58.2 OTHER HEMOGLOBINOPATHIES: ICD-10-CM

## 2017-02-09 DIAGNOSIS — D64.9 ABSOLUTE ANEMIA: ICD-10-CM

## 2017-02-09 DIAGNOSIS — D64.9 SEVERE ANEMIA: Primary | ICD-10-CM

## 2017-02-09 DIAGNOSIS — D63.1 ANEMIA IN CKD (CHRONIC KIDNEY DISEASE): ICD-10-CM

## 2017-02-09 DIAGNOSIS — D84.9 IMMUNOSUPPRESSED STATUS: ICD-10-CM

## 2017-02-09 DIAGNOSIS — D70.2 NEUTROPENIA, DRUG-INDUCED: ICD-10-CM

## 2017-02-09 DIAGNOSIS — K74.3 PBC (PRIMARY BILIARY CIRRHOSIS): ICD-10-CM

## 2017-02-09 DIAGNOSIS — I15.9 SECONDARY HYPERTENSION: Primary | ICD-10-CM

## 2017-02-09 DIAGNOSIS — Z94.0 S/P KIDNEY TRANSPLANT: ICD-10-CM

## 2017-02-09 LAB
ALBUMIN SERPL BCP-MCNC: 3.2 G/DL
ALP SERPL-CCNC: 59 U/L
ALT SERPL W/O P-5'-P-CCNC: 25 U/L
ANION GAP SERPL CALC-SCNC: 4 MMOL/L
AST SERPL-CCNC: 25 U/L
BASOPHILS # BLD AUTO: 0.03 K/UL
BASOPHILS NFR BLD: 0.7 %
BILIRUB SERPL-MCNC: 0.7 MG/DL
BUN SERPL-MCNC: 12 MG/DL
CALCIUM SERPL-MCNC: 8.3 MG/DL
CHLORIDE SERPL-SCNC: 94 MMOL/L
CO2 SERPL-SCNC: 39 MMOL/L
CREAT SERPL-MCNC: 1.96 MG/DL
DIFFERENTIAL METHOD: ABNORMAL
EOSINOPHIL # BLD AUTO: 0.2 K/UL
EOSINOPHIL NFR BLD: 5.2 %
ERYTHROCYTE [DISTWIDTH] IN BLOOD BY AUTOMATED COUNT: 18.6 %
EST. GFR  (AFRICAN AMERICAN): 29 ML/MIN/1.73 M^2
EST. GFR  (NON AFRICAN AMERICAN): 25 ML/MIN/1.73 M^2
GLUCOSE SERPL-MCNC: 81 MG/DL
HCT VFR BLD AUTO: 30.1 %
HGB BLD-MCNC: 9.1 G/DL
LYMPHOCYTES # BLD AUTO: 0.6 K/UL
LYMPHOCYTES NFR BLD: 14.5 %
MCH RBC QN AUTO: 31.6 PG
MCHC RBC AUTO-ENTMCNC: 30.2 %
MCV RBC AUTO: 105 FL
MONOCYTES # BLD AUTO: 0.7 K/UL
MONOCYTES NFR BLD: 15 %
NEUTROPHILS # BLD AUTO: 2.8 K/UL
NEUTROPHILS NFR BLD: 64.6 %
NRBC BLD-RTO: 0 /100 WBC
PLATELET # BLD AUTO: 228 K/UL
PMV BLD AUTO: 9.8 FL
POTASSIUM SERPL-SCNC: 3.4 MMOL/L
PROT SERPL-MCNC: 5.8 G/DL
RBC # BLD AUTO: 2.88 M/UL
SODIUM SERPL-SCNC: 137 MMOL/L
WBC # BLD AUTO: 4.41 K/UL

## 2017-02-09 PROCEDURE — 96367 TX/PROPH/DG ADDL SEQ IV INF: CPT | Mod: PN

## 2017-02-09 PROCEDURE — 36415 COLL VENOUS BLD VENIPUNCTURE: CPT | Mod: PN

## 2017-02-09 PROCEDURE — 99499 UNLISTED E&M SERVICE: CPT | Mod: S$PBB,,, | Performed by: INTERNAL MEDICINE

## 2017-02-09 PROCEDURE — 63600175 PHARM REV CODE 636 W HCPCS: Mod: PN | Performed by: INTERNAL MEDICINE

## 2017-02-09 PROCEDURE — 80053 COMPREHEN METABOLIC PANEL: CPT

## 2017-02-09 PROCEDURE — 99999 PR PBB SHADOW E&M-EST. PATIENT-LVL III: CPT | Mod: PBBFAC,,, | Performed by: INTERNAL MEDICINE

## 2017-02-09 PROCEDURE — 96415 CHEMO IV INFUSION ADDL HR: CPT | Mod: PN

## 2017-02-09 PROCEDURE — 85025 COMPLETE CBC W/AUTO DIFF WBC: CPT

## 2017-02-09 PROCEDURE — 85025 COMPLETE CBC W/AUTO DIFF WBC: CPT | Mod: PN

## 2017-02-09 PROCEDURE — 1160F RVW MEDS BY RX/DR IN RCRD: CPT | Mod: S$GLB,,, | Performed by: INTERNAL MEDICINE

## 2017-02-09 PROCEDURE — 1159F MED LIST DOCD IN RCRD: CPT | Mod: S$GLB,,, | Performed by: INTERNAL MEDICINE

## 2017-02-09 PROCEDURE — 99215 OFFICE O/P EST HI 40 MIN: CPT | Mod: S$GLB,,, | Performed by: INTERNAL MEDICINE

## 2017-02-09 PROCEDURE — 96413 CHEMO IV INFUSION 1 HR: CPT | Mod: PN

## 2017-02-09 PROCEDURE — 1157F ADVNC CARE PLAN IN RCRD: CPT | Mod: S$GLB,,, | Performed by: INTERNAL MEDICINE

## 2017-02-09 PROCEDURE — 80053 COMPREHEN METABOLIC PANEL: CPT | Mod: PN

## 2017-02-09 PROCEDURE — 25000003 PHARM REV CODE 250: Mod: PN | Performed by: INTERNAL MEDICINE

## 2017-02-09 PROCEDURE — 1126F AMNT PAIN NOTED NONE PRSNT: CPT | Mod: S$GLB,,, | Performed by: INTERNAL MEDICINE

## 2017-02-09 RX ORDER — FAMOTIDINE 20 MG/50ML
20 INJECTION, SOLUTION INTRAVENOUS
Status: CANCELLED | OUTPATIENT
Start: 2017-02-09 | End: 2017-02-09

## 2017-02-09 RX ORDER — HEPARIN 100 UNIT/ML
500 SYRINGE INTRAVENOUS
Status: CANCELLED | OUTPATIENT
Start: 2017-02-09

## 2017-02-09 RX ORDER — FAMOTIDINE 20 MG/50ML
20 INJECTION, SOLUTION INTRAVENOUS
Status: COMPLETED | OUTPATIENT
Start: 2017-02-09 | End: 2017-02-09

## 2017-02-09 RX ORDER — ACETAMINOPHEN 325 MG/1
650 TABLET ORAL
Status: CANCELLED | OUTPATIENT
Start: 2017-02-09

## 2017-02-09 RX ORDER — SODIUM CHLORIDE 0.9 % (FLUSH) 0.9 %
10 SYRINGE (ML) INJECTION
Status: CANCELLED | OUTPATIENT
Start: 2017-02-09

## 2017-02-09 RX ORDER — SODIUM CHLORIDE 0.9 % (FLUSH) 0.9 %
10 SYRINGE (ML) INJECTION
Status: DISCONTINUED | OUTPATIENT
Start: 2017-02-09 | End: 2017-02-09 | Stop reason: HOSPADM

## 2017-02-09 RX ORDER — ACETAMINOPHEN 325 MG/1
650 TABLET ORAL
Status: COMPLETED | OUTPATIENT
Start: 2017-02-09 | End: 2017-02-09

## 2017-02-09 RX ORDER — MEPERIDINE HYDROCHLORIDE 50 MG/ML
25 INJECTION INTRAMUSCULAR; INTRAVENOUS; SUBCUTANEOUS
Status: DISCONTINUED | OUTPATIENT
Start: 2017-02-09 | End: 2017-02-09 | Stop reason: HOSPADM

## 2017-02-09 RX ORDER — MEPERIDINE HYDROCHLORIDE 25 MG/ML
25 INJECTION INTRAMUSCULAR; INTRAVENOUS; SUBCUTANEOUS
Status: CANCELLED | OUTPATIENT
Start: 2017-02-09 | End: 2017-02-09

## 2017-02-09 RX ADMIN — FAMOTIDINE 20 MG: 20 INJECTION, SOLUTION INTRAVENOUS at 11:02

## 2017-02-09 RX ADMIN — SODIUM CHLORIDE: 0.9 INJECTION, SOLUTION INTRAVENOUS at 11:02

## 2017-02-09 RX ADMIN — ACETAMINOPHEN 650 MG: 325 TABLET ORAL at 11:02

## 2017-02-09 RX ADMIN — RITUXIMAB 630 MG: 10 INJECTION, SOLUTION INTRAVENOUS at 11:02

## 2017-02-09 RX ADMIN — SODIUM CHLORIDE, PRESERVATIVE FREE 10 ML: 5 INJECTION INTRAVENOUS at 10:02

## 2017-02-09 RX ADMIN — DIPHENHYDRAMINE HYDROCHLORIDE 25 MG: 50 INJECTION, SOLUTION INTRAMUSCULAR; INTRAVENOUS at 11:02

## 2017-02-09 NOTE — PLAN OF CARE
Problem: Patient Care Overview (Adult)  Goal: Plan of Care Review  Outcome: Ongoing (interventions implemented as appropriate)  Tolerated chemo infusion without any difficulty; RTC 2 weeks for next tx; Amb off unit independently

## 2017-02-09 NOTE — PROGRESS NOTES
Subjective:       Patient ID: Dacia Hamilton is a 71 y.o. female.    Chief Complaint: rituxan  HPI:   oN Rituxan maintenance for hemolytic anemia. wentr to ED dx with the Flu and on Tamiflu since last week.   Receiving dialysis , taken off myfortil for renal immunosuppression to help alleviate anemia some. Was delayed by 1 week due to flu, here for rx today  REVIEW OF SYSTEMS:     No issues on 14 point review today    PHYSICAL EXAM:     Vitals:    02/09/17 0943   BP: (!) 180/70   Pulse: 70   Resp: 19       GENERAL: Comfortable looking patient. Patient is in no distress.  Awake, alert and oriented to time, person and place.  No anxiety, or agitation.      HEENT: Normal conjunctivae and eyelids. WNL.  PERRLA 3 to 4 mm. No icterus, no pallor, no congestion, and no discharge noted.     NECK:  Supple. Trachea is central.  No crepitus.  No JVD or masses.    RESPIRATORY:  No intercostal retractions.  No dullness to percussion.  Chest is clear to auscultation.  No rales, rhonchi or wheezes.  No crepitus.  Good air entry bilaterally.    CARDIOVASCULAR:  S1 and S2 are normally heard without murmurs or gallops.  All peripheral pulses are present.    ABDOMEN:  Normal abdomen.  No hepatosplenomegaly.  No free fluid.  Bowel sounds are present.  No hernia noted. No masses.  No rebound or tenderness.  No guarding or rigidity.  Umbilicus is midline.    LYMPHATICS:  No axillary, cervical, supraclavicular, submental, or inguinal lymphadenopathy.    SKIN/MUSCULOSKELETAL:  There is no evidence of excoriation marks or ecchmosis.  No rashes.  No cyanosis.  No clubbing.  No joint or skeletal deformities noted.  Normal range of motion.    NEUROLOGIC:  Higher functions are appropriate.  No cranial nerve deficits.  Normal fadia.  Normal strength.  Motor and sensory functions are normal.  Deep tendon reflexes are normal.    GENITAL/RECTAL:  Exams are deferred.      Laboratory:     CBC:  Lab Results   Component Value Date    WBC 4.41 02/09/2017     RBC 2.88 (L) 02/09/2017    HGB 9.1 (L) 02/09/2017    HCT 30.1 (L) 02/09/2017     (H) 02/09/2017    MCH 31.6 (H) 02/09/2017    MCHC 30.2 (L) 02/09/2017    RDW 18.6 (H) 02/09/2017     02/09/2017    MPV 9.8 02/09/2017    GRAN 2.8 02/09/2017    GRAN 64.6 02/09/2017    LYMPH 0.6 (L) 02/09/2017    LYMPH 14.5 (L) 02/09/2017    MONO 0.7 02/09/2017    MONO 15.0 02/09/2017    EOS 0.2 02/09/2017    BASO 0.03 02/09/2017    EOSINOPHIL 5.2 02/09/2017    BASOPHIL 0.7 02/09/2017       BMP: BMP  Lab Results   Component Value Date     02/09/2017    K 3.4 (L) 02/09/2017    CL 94 (L) 02/09/2017    CO2 39 (H) 02/09/2017    BUN 12 02/09/2017    CREATININE 1.96 (H) 02/09/2017    CALCIUM 8.3 (L) 02/09/2017    ANIONGAP 4 (L) 02/09/2017    ESTGFRAFRICA 29 (A) 02/09/2017    EGFRNONAA 25 (A) 02/09/2017       LFT:   Lab Results   Component Value Date    ALT 25 02/09/2017    AST 25 02/09/2017    GGT 25 09/02/2014    ALKPHOS 59 02/09/2017    BILITOT 0.7 02/09/2017         Assessment/Plan:       1. Secondary hypertension    2. Neutropenia, drug-induced    3. Other hemoglobinopathies    4. PBC (primary biliary cirrhosis)    5. S/P kidney transplant    6. Immunosuppressed status        Getting AV access placed next week for dialysis.   Receiving epogen during dialysis  Change rituxan to q 2weeks  rtc 2 weeks with cbc, cmp

## 2017-02-13 RX ORDER — FUROSEMIDE 80 MG/1
80 TABLET ORAL DAILY
Qty: 30 TABLET | Refills: 6
Start: 2017-02-13 | End: 2017-02-14 | Stop reason: SDUPTHER

## 2017-02-13 NOTE — TELEPHONE ENCOUNTER
----- Message from Tania Pascual sent at 2/13/2017 12:03 PM CST -----  Contact: pharmacy   .  [id number] [place] - [id number]  [address] [place] [address]  Phone: [phone] Fax: [phone]    furosemide (LASIX) 40

## 2017-02-14 ENCOUNTER — TELEPHONE (OUTPATIENT)
Dept: TRANSPLANT | Facility: CLINIC | Age: 72
End: 2017-02-14

## 2017-02-14 RX ORDER — FUROSEMIDE 80 MG/1
80 TABLET ORAL DAILY
Qty: 30 TABLET | Refills: 6 | Status: SHIPPED | OUTPATIENT
Start: 2017-02-14 | End: 2017-02-22 | Stop reason: SDUPTHER

## 2017-02-14 NOTE — TELEPHONE ENCOUNTER
----- Message from Rhonda Dumont sent at 2/14/2017 11:58 AM CST -----  Contact[facility] [place] [phone]  Pharmacy is calling because they received a cancellation request from office on patient's Lasix 40 mg. Patient states she needs to continue on it. Please call to clarify.

## 2017-02-14 NOTE — TELEPHONE ENCOUNTER
----- Message from Kae Dove sent at 2/14/2017  4:40 PM CST -----  Contact: [facility]   [phone]      ----- Message -----     From: Stephanie Florez     Sent: 2/14/2017   9:47 AM       To: [facility] Post-Liver Transplant Non-Clinical    Called regarding the receipt of the PA for tacrolimus, please call [phone]

## 2017-02-14 NOTE — TELEPHONE ENCOUNTER
Returned call, phone number invalid, no answer. Form received but wrong physician listed as ordering provider.  Will call again tomorrow to have form changed.

## 2017-02-14 NOTE — TELEPHONE ENCOUNTER
----- Message from Rhonda Dumont sent at 2/14/2017 11:58 AM CST -----  Contact[facility] [place] [phone]  Pharmacy is calling because they received a cancellation request from office on patient's Lasix 40 mg. Patient states she needs to continue on it. Please call to clarify.

## 2017-02-15 ENCOUNTER — TELEPHONE (OUTPATIENT)
Dept: TRANSPLANT | Facility: CLINIC | Age: 72
End: 2017-02-15

## 2017-02-15 NOTE — TELEPHONE ENCOUNTER
----- Message from Shandra Kumar sent at 2/15/2017 11:23 AM CST -----  Contact: [email or web address]  Calling to see of we got a fax for a PA for tacrolimus (PROGRAF) 1 MG Cap, please call [employer] @ # [phone] EXt [phone]   Fax # [phone].

## 2017-02-16 ENCOUNTER — TELEPHONE (OUTPATIENT)
Dept: TRANSPLANT | Facility: CLINIC | Age: 72
End: 2017-02-16

## 2017-02-16 NOTE — TELEPHONE ENCOUNTER
----- Message from Beth Heard sent at 2/16/2017  8:49 AM CST -----  Contact: [facility]  ..Pharmacy calling to see if PA for tacrolimus (PROGRAF) 1 MG Cap was receivd, please call  [phone]. [phone]

## 2017-02-16 NOTE — TELEPHONE ENCOUNTER
Returned call to [employer]. Updated that we did receive the PA on the Prograf but are waiting on Dr. Wise' signature to fax.

## 2017-02-17 ENCOUNTER — TELEPHONE (OUTPATIENT)
Dept: TRANSPLANT | Facility: CLINIC | Age: 72
End: 2017-02-17

## 2017-02-17 NOTE — TELEPHONE ENCOUNTER
----- Message from Stephanie Florez sent at 2/17/2017 10:46 AM CST -----  Contact: Ruthann BAKER  [phone]  Asking for a call regarding the PA for tacrolimus, says that pt is almost out. Please call back at [phone]

## 2017-02-17 NOTE — TELEPHONE ENCOUNTER
Called [employer] to verify that they are reviewing the expedited PA for Prograf. They stated it was approved. Talked to Ruthann and she said claim went through, and she will call the pt.

## 2017-02-18 RX ORDER — AMLODIPINE BESYLATE 5 MG/1
TABLET ORAL
Qty: 180 TABLET | Refills: 3 | Status: SHIPPED | OUTPATIENT
Start: 2017-02-18 | End: 2017-05-04

## 2017-02-18 RX ORDER — OMEPRAZOLE 20 MG/1
CAPSULE, DELAYED RELEASE ORAL
Qty: 90 CAPSULE | Refills: 3 | Status: SHIPPED | OUTPATIENT
Start: 2017-02-18 | End: 2017-04-18

## 2017-02-21 ENCOUNTER — HOSPITAL ENCOUNTER (OUTPATIENT)
Dept: RADIOLOGY | Facility: HOSPITAL | Age: 72
Discharge: HOME | End: 2017-02-21
Attending: FAMILY MEDICINE
Payer: MEDICARE

## 2017-02-21 ENCOUNTER — OFFICE VISIT (OUTPATIENT)
Dept: FAMILY MEDICINE | Facility: CLINIC | Age: 72
End: 2017-02-21
Payer: MEDICARE

## 2017-02-21 VITALS
RESPIRATION RATE: 18 BRPM | DIASTOLIC BLOOD PRESSURE: 72 MMHG | HEIGHT: 65 IN | HEART RATE: 69 BPM | OXYGEN SATURATION: 97 % | SYSTOLIC BLOOD PRESSURE: 170 MMHG | WEIGHT: 126.56 LBS | BODY MASS INDEX: 21.09 KG/M2 | TEMPERATURE: 98 F

## 2017-02-21 DIAGNOSIS — R06.02 SOB (SHORTNESS OF BREATH): Primary | ICD-10-CM

## 2017-02-21 DIAGNOSIS — I50.32 CHRONIC DIASTOLIC CONGESTIVE HEART FAILURE: ICD-10-CM

## 2017-02-21 DIAGNOSIS — D63.8 ANEMIA OF CHRONIC DISORDER: ICD-10-CM

## 2017-02-21 DIAGNOSIS — N18.5 CKD (CHRONIC KIDNEY DISEASE) STAGE 5, GFR LESS THAN 15 ML/MIN: ICD-10-CM

## 2017-02-21 DIAGNOSIS — R06.02 SOB (SHORTNESS OF BREATH): ICD-10-CM

## 2017-02-21 PROCEDURE — 71020 XR CHEST PA AND LATERAL: CPT | Mod: TC,PO

## 2017-02-21 PROCEDURE — 99499 UNLISTED E&M SERVICE: CPT | Mod: S$PBB,,, | Performed by: PHYSICIAN ASSISTANT

## 2017-02-21 PROCEDURE — 99999 PR PBB SHADOW E&M-EST. PATIENT-LVL V: CPT | Mod: PBBFAC,,, | Performed by: PHYSICIAN ASSISTANT

## 2017-02-21 PROCEDURE — 1157F ADVNC CARE PLAN IN RCRD: CPT | Mod: S$GLB,,, | Performed by: PHYSICIAN ASSISTANT

## 2017-02-21 PROCEDURE — 71020 XR CHEST PA AND LATERAL: CPT | Mod: 26,,, | Performed by: RADIOLOGY

## 2017-02-21 PROCEDURE — 1159F MED LIST DOCD IN RCRD: CPT | Mod: S$GLB,,, | Performed by: PHYSICIAN ASSISTANT

## 2017-02-21 PROCEDURE — 1160F RVW MEDS BY RX/DR IN RCRD: CPT | Mod: S$GLB,,, | Performed by: PHYSICIAN ASSISTANT

## 2017-02-21 PROCEDURE — 99214 OFFICE O/P EST MOD 30 MIN: CPT | Mod: S$GLB,,, | Performed by: PHYSICIAN ASSISTANT

## 2017-02-21 NOTE — Clinical Note
Pt was seen yesterday because of increased SOB   Chest xray and CBC appear stable but BNP is 1739 Dr Woods asked me to CC you about  her worsening diastolic heart failure and consideration of further reducing her fluid load He also asked me to refer pt back to cardiology as well Thank you for your help

## 2017-02-22 RX ORDER — FUROSEMIDE 80 MG/1
80 TABLET ORAL DAILY
Qty: 30 TABLET | Refills: 6 | Status: SHIPPED | OUTPATIENT
Start: 2017-02-22 | End: 2017-04-11 | Stop reason: SDUPTHER

## 2017-02-22 NOTE — PROGRESS NOTES
Subjective:       Patient ID: Dacia Hamilton is a 71 y.o. female.    Chief Complaint: Shortness of Breath (x 1 week); Fatigue; and Cyst (behind left knee)    HPI   Pt has multi problems  SOB on exertion is main complaint  Under treatment for anemia  Liver and kidney disease with hx of liver and kidney transplant  Lower ext edema  Receives dialysis 3 times wkly.  Eats OK  No fever  Wt. Stable  Pt had shunt/port placed recently for the dialysis  Was told recently that she may have heart failure  Recent cardiology eval showed evidence of  L diastolic dysfunction but no evidence of failure  Review of Systems   Constitutional: Positive for activity change and fatigue. Negative for appetite change, chills, diaphoresis, fever and unexpected weight change.   HENT: Negative.    Eyes: Negative.    Respiratory: Positive for apnea and shortness of breath. Negative for cough, choking, chest tightness, wheezing and stridor.    Cardiovascular: Positive for leg swelling. Negative for chest pain and palpitations.   Gastrointestinal: Negative.    Endocrine: Negative.    Genitourinary: Negative.    Musculoskeletal: Negative.    Skin: Negative.  Negative for rash.   Neurological: Negative.        Objective:      Physical Exam   Constitutional: She is oriented to person, place, and time. She appears well-developed and well-nourished. No distress.   HENT:   Head: Normocephalic and atraumatic.   Eyes: No scleral icterus.   Palpebral conjunctiva pale    Neck: Normal range of motion. Neck supple. No tracheal deviation present. No thyromegaly present.   Cardiovascular: Normal rate, regular rhythm, normal heart sounds and intact distal pulses.  Exam reveals no gallop and no friction rub.    No murmur heard.  Pulmonary/Chest: Effort normal. No respiratory distress. She has no wheezes. She has no rales.   bilat rhonchi L>R   Abdominal: Soft. Bowel sounds are normal. She exhibits mass. She exhibits no distension. There is tenderness. There is no  rebound and no guarding.   Mild tenderness LLQ at site of pelvic kidney   Musculoskeletal: She exhibits edema. She exhibits no tenderness or deformity.   2 + lower ext edema   Lymphadenopathy:     She has no cervical adenopathy.   Neurological: She is alert and oriented to person, place, and time.   Skin: Skin is warm and dry. No rash noted.   Psychiatric: She has a normal mood and affect. Her behavior is normal. Judgment and thought content normal.   Vitals reviewed.      Assessment:       1. SOB (shortness of breath)    2. Chronic diastolic congestive heart failure    3. CKD (chronic kidney disease) stage 5, GFR less than 15 ml/min    4. Anemia of chronic disorder        Plan:       Dacia was seen today for shortness of breath, fatigue and cyst.    Diagnoses and all orders for this visit:    SOB (shortness of breath)  -     Brain natriuretic peptide; Future  -     CBC auto differential; Future  -     X-Ray Chest PA And Lateral; Future    Chronic diastolic congestive heart failure  -     Brain natriuretic peptide; Future  -     CBC auto differential; Future  -     X-Ray Chest PA And Lateral; Future    CKD (chronic kidney disease) stage 5, GFR less than 15 ml/min  -     Brain natriuretic peptide; Future  -     CBC auto differential; Future  -     X-Ray Chest PA And Lateral; Future    Anemia of chronic disorder  -     Brain natriuretic peptide; Future  -     CBC auto differential; Future  -     X-Ray Chest PA And Lateral; Future      Pt will be seen at dialysis tomorrow  Pt will try and discuss issues with her nephrology DRMelvin  I've have discussed possible more aggressive dialysis if possible to reduce her fluid load  Pt will f/u with Dr. Woods in the near future    Chest xray does not show any acute changes  CBC results are consistent with previous tests  BNP is 1739   I have discussed results with Dr. Woods this AM and he agrees that further cardiology  Input is needed  He also suggests CC note to Dr Godfrey in  nephrology   Pt has been called and notified about results and plan

## 2017-02-23 ENCOUNTER — INFUSION (OUTPATIENT)
Dept: INFUSION THERAPY | Facility: HOSPITAL | Age: 72
End: 2017-02-23
Attending: INTERNAL MEDICINE
Payer: MEDICARE

## 2017-02-23 ENCOUNTER — OFFICE VISIT (OUTPATIENT)
Dept: HEMATOLOGY/ONCOLOGY | Facility: CLINIC | Age: 72
End: 2017-02-23
Payer: MEDICARE

## 2017-02-23 ENCOUNTER — TELEPHONE (OUTPATIENT)
Dept: FAMILY MEDICINE | Facility: CLINIC | Age: 72
End: 2017-02-23

## 2017-02-23 VITALS
HEART RATE: 71 BPM | DIASTOLIC BLOOD PRESSURE: 84 MMHG | HEIGHT: 65 IN | WEIGHT: 123.25 LBS | SYSTOLIC BLOOD PRESSURE: 185 MMHG | TEMPERATURE: 98 F | BODY MASS INDEX: 20.54 KG/M2 | RESPIRATION RATE: 18 BRPM

## 2017-02-23 VITALS
RESPIRATION RATE: 16 BRPM | DIASTOLIC BLOOD PRESSURE: 86 MMHG | SYSTOLIC BLOOD PRESSURE: 185 MMHG | HEART RATE: 74 BPM | TEMPERATURE: 98 F

## 2017-02-23 DIAGNOSIS — N18.6 ESRD (END STAGE RENAL DISEASE): ICD-10-CM

## 2017-02-23 DIAGNOSIS — D64.9 SEVERE ANEMIA: ICD-10-CM

## 2017-02-23 DIAGNOSIS — T86.11 ACUTE REJECTION OF KIDNEY TRANSPLANT: ICD-10-CM

## 2017-02-23 DIAGNOSIS — D64.9 SEVERE ANEMIA: Primary | ICD-10-CM

## 2017-02-23 DIAGNOSIS — R06.09 DYSPNEA ON EXERTION: Primary | ICD-10-CM

## 2017-02-23 DIAGNOSIS — Z94.0 S/P KIDNEY TRANSPLANT: ICD-10-CM

## 2017-02-23 DIAGNOSIS — R79.89 ELEVATED BRAIN NATRIURETIC PEPTIDE (BNP) LEVEL: Primary | ICD-10-CM

## 2017-02-23 PROCEDURE — 96367 TX/PROPH/DG ADDL SEQ IV INF: CPT | Mod: PN

## 2017-02-23 PROCEDURE — 1126F AMNT PAIN NOTED NONE PRSNT: CPT | Mod: S$GLB,,, | Performed by: INTERNAL MEDICINE

## 2017-02-23 PROCEDURE — 96415 CHEMO IV INFUSION ADDL HR: CPT | Mod: PN

## 2017-02-23 PROCEDURE — 99999 PR PBB SHADOW E&M-EST. PATIENT-LVL III: CPT | Mod: PBBFAC,,, | Performed by: INTERNAL MEDICINE

## 2017-02-23 PROCEDURE — 1157F ADVNC CARE PLAN IN RCRD: CPT | Mod: S$GLB,,, | Performed by: INTERNAL MEDICINE

## 2017-02-23 PROCEDURE — 63600175 PHARM REV CODE 636 W HCPCS: Mod: PN | Performed by: INTERNAL MEDICINE

## 2017-02-23 PROCEDURE — 1159F MED LIST DOCD IN RCRD: CPT | Mod: S$GLB,,, | Performed by: INTERNAL MEDICINE

## 2017-02-23 PROCEDURE — 99215 OFFICE O/P EST HI 40 MIN: CPT | Mod: S$GLB,,, | Performed by: INTERNAL MEDICINE

## 2017-02-23 PROCEDURE — 1160F RVW MEDS BY RX/DR IN RCRD: CPT | Mod: S$GLB,,, | Performed by: INTERNAL MEDICINE

## 2017-02-23 PROCEDURE — 25000003 PHARM REV CODE 250: Mod: PN | Performed by: INTERNAL MEDICINE

## 2017-02-23 PROCEDURE — 99499 UNLISTED E&M SERVICE: CPT | Mod: S$PBB,,, | Performed by: INTERNAL MEDICINE

## 2017-02-23 PROCEDURE — 96413 CHEMO IV INFUSION 1 HR: CPT | Mod: PN

## 2017-02-23 RX ORDER — ACETAMINOPHEN 325 MG/1
650 TABLET ORAL
Status: CANCELLED | OUTPATIENT
Start: 2017-02-23

## 2017-02-23 RX ORDER — SODIUM CHLORIDE 0.9 % (FLUSH) 0.9 %
10 SYRINGE (ML) INJECTION
Status: CANCELLED | OUTPATIENT
Start: 2017-02-23

## 2017-02-23 RX ORDER — ACETAMINOPHEN 325 MG/1
650 TABLET ORAL
Status: COMPLETED | OUTPATIENT
Start: 2017-02-23 | End: 2017-02-23

## 2017-02-23 RX ORDER — SODIUM CHLORIDE 0.9 % (FLUSH) 0.9 %
10 SYRINGE (ML) INJECTION
Status: CANCELLED | OUTPATIENT
Start: 2017-05-04

## 2017-02-23 RX ORDER — MEPERIDINE HYDROCHLORIDE 50 MG/ML
25 INJECTION INTRAMUSCULAR; INTRAVENOUS; SUBCUTANEOUS
Status: DISCONTINUED | OUTPATIENT
Start: 2017-02-23 | End: 2017-02-23 | Stop reason: HOSPADM

## 2017-02-23 RX ORDER — HEPARIN 100 UNIT/ML
500 SYRINGE INTRAVENOUS
Status: CANCELLED | OUTPATIENT
Start: 2017-05-04

## 2017-02-23 RX ORDER — FAMOTIDINE 20 MG/50ML
20 INJECTION, SOLUTION INTRAVENOUS
Status: COMPLETED | OUTPATIENT
Start: 2017-02-23 | End: 2017-02-23

## 2017-02-23 RX ORDER — ACETAMINOPHEN 325 MG/1
650 TABLET ORAL
Status: CANCELLED | OUTPATIENT
Start: 2017-05-04

## 2017-02-23 RX ORDER — SODIUM CHLORIDE 0.9 % (FLUSH) 0.9 %
10 SYRINGE (ML) INJECTION
Status: DISCONTINUED | OUTPATIENT
Start: 2017-02-23 | End: 2017-02-23 | Stop reason: HOSPADM

## 2017-02-23 RX ORDER — MEPERIDINE HYDROCHLORIDE 25 MG/ML
25 INJECTION INTRAMUSCULAR; INTRAVENOUS; SUBCUTANEOUS
Status: CANCELLED | OUTPATIENT
Start: 2017-05-04 | End: 2017-03-09

## 2017-02-23 RX ORDER — FAMOTIDINE 20 MG/50ML
20 INJECTION, SOLUTION INTRAVENOUS
Status: CANCELLED | OUTPATIENT
Start: 2017-05-04 | End: 2017-03-09

## 2017-02-23 RX ORDER — MEPERIDINE HYDROCHLORIDE 25 MG/ML
25 INJECTION INTRAMUSCULAR; INTRAVENOUS; SUBCUTANEOUS
Status: CANCELLED | OUTPATIENT
Start: 2017-02-23 | End: 2017-02-23

## 2017-02-23 RX ORDER — HEPARIN 100 UNIT/ML
500 SYRINGE INTRAVENOUS
Status: CANCELLED | OUTPATIENT
Start: 2017-02-23

## 2017-02-23 RX ORDER — FAMOTIDINE 20 MG/50ML
20 INJECTION, SOLUTION INTRAVENOUS
Status: CANCELLED | OUTPATIENT
Start: 2017-02-23 | End: 2017-02-23

## 2017-02-23 RX ADMIN — RITUXIMAB 630 MG: 10 INJECTION, SOLUTION INTRAVENOUS at 12:02

## 2017-02-23 RX ADMIN — Medication 10 ML: at 11:02

## 2017-02-23 RX ADMIN — ACETAMINOPHEN 650 MG: 325 TABLET ORAL at 11:02

## 2017-02-23 RX ADMIN — DIPHENHYDRAMINE HYDROCHLORIDE 25 MG: 50 INJECTION, SOLUTION INTRAMUSCULAR; INTRAVENOUS at 11:02

## 2017-02-23 RX ADMIN — FAMOTIDINE 20 MG: 20 INJECTION, SOLUTION INTRAVENOUS at 11:02

## 2017-02-23 RX ADMIN — Medication 10 ML: at 03:02

## 2017-02-23 RX ADMIN — SODIUM CHLORIDE: 0.9 INJECTION, SOLUTION INTRAVENOUS at 11:02

## 2017-02-23 NOTE — TELEPHONE ENCOUNTER
Spoke w/ pt. First available that correlates w/ pt's schedule is 9am on 3/2/17 w/ Dr Kellogg. Appt scheduled.

## 2017-02-23 NOTE — TELEPHONE ENCOUNTER
Mr Andrew spoke w/ Dr Woods and is wanting us to get pt in to see Cardiology next week(see recent BNP results). Please review order and associate a dx, so we can call and schedule.

## 2017-02-23 NOTE — MR AVS SNAPSHOT
[facility]  [address]  [facility] [address]  Phone: [phone]  Fax: [phone]                  Dacia Hamilton   2017 9:40 AM   Office Visit    Description:  Female : 1945   Provider:  Kaylan Metz MD   Department:  [facility]                To Do List           Future Appointments        Provider Department Dept Phone    2017 8:00 AM LAB, [place] [facility] [phone]    3/9/2017 12:30 PM CHAIR 11, [facility] [phone]    3/23/2017 9:45 AM LAB, [facility] DRAW [facility] [phone]    3/23/2017 10:40 AM Kaylan Metz MD [facility] [phone]    3/23/2017 11:30 AM CHAIR 34, [facility] CHE[facility] [phone]      Goals (5 Years of Data)     None      [facility] On [facility]     [facility] Care Line -  Assistance  Registered nurses in the [facility] provide clinical advisement, health education, appointment booking, and other advisory services.  Call for this free service at [phone].             Medications           Message regarding Medications     Verify the changes and/or additions to your medication regime listed below are the same as discussed with your clinician today.  If any of these changes or additions are incorrect, please notify your healthcare provider.             Verify that the below list of medications is an accurate representation of the medications you are currently taking.  If none reported, the list may be blank. If incorrect, please contact your healthcare provider. Carry this list with you in case of emergency.           Current Medications     alprazolam (XANAX) 0.25 MG tablet One po prn anxiety not to exceed one per day    amlodipine (NORVASC) 5 MG tablet TAKE ONE TABLET BY MOUTH TWICE DAILY    benzonatate (TESSALON) 100 MG capsule Take 1 capsule (100 mg total) by  mouth 3 (three) times daily as needed for Cough.    calcitRIOL (ROCALTROL) 0.5 MCG Cap Take 1 capsule (0.5 mcg total) by mouth once daily.    calcium carbonate (OS-ALBINA) 600 mg (1,500 mg) Tab Take 600 mg by mouth once daily.     cholestyramine (QUESTRAN) 4 gram packet Take 1 packet (4 g total) by mouth 2 (two) times daily.    cloNIDine 0.1 mg/24 hr td ptwk (CATAPRES) 0.1 mg/24 hr Place 1 patch onto the skin every 7 days.    fish oil-omega-3 fatty acids 300-1,000 mg capsule Take 2 g by mouth once daily.      FOLBEE PLUS 5 mg Tab Take 1 tablet by mouth every evening.    folic acid (FOLVITE) 400 MCG tablet Take 800 mcg by mouth once daily.    furosemide (LASIX) 80 MG tablet Take 1 tablet (80 mg total) by mouth once daily.    hydrocodone-acetaminophen 5-325mg (NORCO) 5-325 mg per tablet Take 1 tablet by mouth every 6 (six) hours as needed for Pain.    levothyroxine (SYNTHROID) 75 MCG tablet Take 75 mcg by mouth once daily.    losartan (COZAAR) 50 MG tablet One po at noon    magnesium oxide (MAG-OX) 400 mg tablet Take 1 tablet (400 mg total) by mouth 3 (three) times daily.    megestrol (MEGACE) 400 mg/10 mL (40 mg/mL) Susp     multivitamin capsule Every morning    omeprazole (PRILOSEC) 20 MG capsule TAKE ONE CAPSULE BY MOUTH ONE TIME DAILY    phenyleph-min oil-petrolatum (PREPARATION H) 0.25-14-74.9 % Oint Place 1 applicator rectally 4 (four) times daily as needed.    promethazine (PHENERGAN) 12.5 MG Tab Take 1 tablet (12.5 mg total) by mouth every 6 (six) hours as needed (nausea). May make you sleep    PROPYLENE GLYCOL/ (SYSTANE, PROPYLENE GLYCOL, OPHT) Apply to eye.    RITUXIMAB (RITUXAN IV) Inject into the vein once a week. Administered at [facility]    sodium bicarbonate 650 MG tablet Take 3 tablets (1,950 mg total) by mouth 2 (two) times daily.    tacrolimus (PROGRAF) 1 MG Cap Take 1 capsule (1 mg total) by mouth every 12 (twelve) hours.    zolpidem (AMBIEN) 5 MG Tab Take 1 tablet (5 mg total) by mouth  "nightly as needed (insomnia). At bedtime           Clinical Reference Information           Your Vitals Were     BP Pulse Temp Resp Height Weight    185/84 71 98.4 °F (36.9 °C) 18 5' 5" (1.651 m) 55.9 kg (123 lb 3.8 oz)    BMI                20.51 kg/m2          Blood Pressure          Most Recent Value    BP  (!)  185/84      Allergies as of 2/23/2017     Flonase [Fluticasone]    Banana      Immunizations Administered on Date of Encounter - 2/23/2017     None      Maintenance Dialysis History     Start End Type Comments Center    1/8/1888  [place]  [facility]            Current Dialysis Center Information     [facility] [address] Phone #:  [phone]    Contact:  N/A ARNAV SMITH  [id number] Fax #:  [phone]            Transplant Information        Txp Date Organ Coordinator Care Team    6/4/2006 Liver Lorin Mejia RN Surgeon:  Brendan Amor MD   Assisting Surgeon:  Victor Hugo Mcintosh Jr., MD   Referring Physician:  Syeda Tong MD   Current Hepatologist:  Victor Hugo Wise MD   Corresponding Physician:  David Woods MD   Current PCP:  David Woods MD          Txp Date Organ Coordinator Care Team    6/4/2006 Kidney Lorin Mejia RN Surgeon:  Brendan Amor MD   Referring Physician:  Syeda Tong MD         Language Assistance Services     ATTENTION: Language assistance services are available, free of charge. Please call [phone].      ATENCIÓN: Si habla español, tiene a barnett disposición servicios gratuitos de asistencia lingüística. Llame al [phone].     [facility] Ý: N?u b?n nói Ti?ng Vi?t, có các d?ch v? h? tr? ngôn ng? mi?n phí dành cho b?n. G?i s? [phone].         [facility] complies with applicable Federal civil rights laws and does not discriminate on the basis of race, color, national origin, age, disability, or sex.        "

## 2017-02-27 ENCOUNTER — LAB VISIT (OUTPATIENT)
Dept: LAB | Facility: HOSPITAL | Age: 72
End: 2017-02-27
Attending: INTERNAL MEDICINE
Payer: MEDICARE

## 2017-02-27 DIAGNOSIS — Z94.4 LIVER TRANSPLANTED: ICD-10-CM

## 2017-02-27 LAB
ALBUMIN SERPL BCP-MCNC: 3.1 G/DL
ALP SERPL-CCNC: 73 U/L
ALT SERPL W/O P-5'-P-CCNC: 11 U/L
ANION GAP SERPL CALC-SCNC: 8 MMOL/L
AST SERPL-CCNC: 17 U/L
BASOPHILS # BLD AUTO: 0.01 K/UL
BASOPHILS NFR BLD: 0.2 %
BILIRUB SERPL-MCNC: 0.5 MG/DL
BUN SERPL-MCNC: 26 MG/DL
CALCIUM SERPL-MCNC: 8.8 MG/DL
CHLORIDE SERPL-SCNC: 99 MMOL/L
CO2 SERPL-SCNC: 32 MMOL/L
CREAT SERPL-MCNC: 3.1 MG/DL
DIFFERENTIAL METHOD: ABNORMAL
EOSINOPHIL # BLD AUTO: 0.4 K/UL
EOSINOPHIL NFR BLD: 7.4 %
ERYTHROCYTE [DISTWIDTH] IN BLOOD BY AUTOMATED COUNT: 16.5 %
EST. GFR  (AFRICAN AMERICAN): 16.4 ML/MIN/1.73 M^2
EST. GFR  (NON AFRICAN AMERICAN): 14.3 ML/MIN/1.73 M^2
GLUCOSE SERPL-MCNC: 94 MG/DL
HCT VFR BLD AUTO: 31.4 %
HGB BLD-MCNC: 10 G/DL
LYMPHOCYTES # BLD AUTO: 0.9 K/UL
LYMPHOCYTES NFR BLD: 17.5 %
MCH RBC QN AUTO: 32.1 PG
MCHC RBC AUTO-ENTMCNC: 31.8 %
MCV RBC AUTO: 101 FL
MONOCYTES # BLD AUTO: 0.4 K/UL
MONOCYTES NFR BLD: 8.3 %
NEUTROPHILS # BLD AUTO: 3.5 K/UL
NEUTROPHILS NFR BLD: 66.4 %
PLATELET # BLD AUTO: 255 K/UL
PMV BLD AUTO: 9.3 FL
POTASSIUM SERPL-SCNC: 4.3 MMOL/L
PROT SERPL-MCNC: 5.9 G/DL
RBC # BLD AUTO: 3.12 M/UL
SODIUM SERPL-SCNC: 139 MMOL/L
WBC # BLD AUTO: 5.27 K/UL

## 2017-02-27 PROCEDURE — 80197 ASSAY OF TACROLIMUS: CPT

## 2017-02-27 PROCEDURE — 36415 COLL VENOUS BLD VENIPUNCTURE: CPT | Mod: PO

## 2017-02-27 PROCEDURE — 80053 COMPREHEN METABOLIC PANEL: CPT

## 2017-02-27 PROCEDURE — 85025 COMPLETE CBC W/AUTO DIFF WBC: CPT

## 2017-02-28 PROBLEM — I50.33 ACUTE ON CHRONIC DIASTOLIC HEART FAILURE: Status: ACTIVE | Noted: 2017-02-28

## 2017-02-28 LAB — TACROLIMUS BLD-MCNC: 2.4 NG/ML

## 2017-03-01 ENCOUNTER — TELEPHONE (OUTPATIENT)
Dept: NEPHROLOGY | Facility: CLINIC | Age: 72
End: 2017-03-01

## 2017-03-01 ENCOUNTER — TELEPHONE (OUTPATIENT)
Dept: TRANSPLANT | Facility: CLINIC | Age: 72
End: 2017-03-01

## 2017-03-01 PROBLEM — R79.89 ELEVATED TROPONIN: Status: ACTIVE | Noted: 2017-03-01

## 2017-03-01 NOTE — TELEPHONE ENCOUNTER
----- Message from Victor Hugo Wise MD sent at 3/1/2017  3:05 PM CST -----  Results reviewed  Creatinine much higher than baseline- needs repeat this week

## 2017-03-02 PROBLEM — E87.70 VOLUME OVERLOAD: Status: ACTIVE | Noted: 2017-03-02

## 2017-03-02 NOTE — TELEPHONE ENCOUNTER
Electronic PA failed for calcitriol 0.5mcg capsules #90 refills 3.   Called [facility] and was transferred to Part D department who advised me to [phone], but they are closed today.

## 2017-03-03 PROBLEM — E46 MALNUTRITION: Chronic | Status: ACTIVE | Noted: 2017-03-03

## 2017-03-05 PROBLEM — I31.39 PERICARDIAL EFFUSION: Status: ACTIVE | Noted: 2017-03-05

## 2017-03-06 ENCOUNTER — TELEPHONE (OUTPATIENT)
Dept: TRANSPLANT | Facility: CLINIC | Age: 72
End: 2017-03-06

## 2017-03-06 DIAGNOSIS — Z94.4 LIVER TRANSPLANTED: Primary | ICD-10-CM

## 2017-03-07 ENCOUNTER — LAB VISIT (OUTPATIENT)
Dept: LAB | Facility: HOSPITAL | Age: 72
End: 2017-03-07
Attending: INTERNAL MEDICINE
Payer: MEDICARE

## 2017-03-07 DIAGNOSIS — Z94.4 LIVER TRANSPLANTED: ICD-10-CM

## 2017-03-07 LAB
ALBUMIN SERPL BCP-MCNC: 3 G/DL
ALP SERPL-CCNC: 69 U/L
ALT SERPL W/O P-5'-P-CCNC: 9 U/L
ANION GAP SERPL CALC-SCNC: 8 MMOL/L
AST SERPL-CCNC: 15 U/L
BASOPHILS # BLD AUTO: 0.02 K/UL
BASOPHILS NFR BLD: 0.4 %
BILIRUB SERPL-MCNC: 0.4 MG/DL
BUN SERPL-MCNC: 29 MG/DL
CALCIUM SERPL-MCNC: 8.9 MG/DL
CHLORIDE SERPL-SCNC: 99 MMOL/L
CO2 SERPL-SCNC: 32 MMOL/L
CREAT SERPL-MCNC: 2.5 MG/DL
DIFFERENTIAL METHOD: ABNORMAL
EOSINOPHIL # BLD AUTO: 0.3 K/UL
EOSINOPHIL NFR BLD: 5.7 %
ERYTHROCYTE [DISTWIDTH] IN BLOOD BY AUTOMATED COUNT: 16.1 %
EST. GFR  (AFRICAN AMERICAN): 21.3 ML/MIN/1.73 M^2
EST. GFR  (NON AFRICAN AMERICAN): 18.5 ML/MIN/1.73 M^2
GLUCOSE SERPL-MCNC: 87 MG/DL
HCT VFR BLD AUTO: 32.1 %
HGB BLD-MCNC: 10.1 G/DL
LYMPHOCYTES # BLD AUTO: 0.9 K/UL
LYMPHOCYTES NFR BLD: 17.9 %
MCH RBC QN AUTO: 32 PG
MCHC RBC AUTO-ENTMCNC: 31.5 %
MCV RBC AUTO: 102 FL
MONOCYTES # BLD AUTO: 0.6 K/UL
MONOCYTES NFR BLD: 12.2 %
NEUTROPHILS # BLD AUTO: 3.3 K/UL
NEUTROPHILS NFR BLD: 63.6 %
PLATELET # BLD AUTO: 316 K/UL
PMV BLD AUTO: 10.2 FL
POTASSIUM SERPL-SCNC: 4 MMOL/L
PROT SERPL-MCNC: 6.1 G/DL
RBC # BLD AUTO: 3.16 M/UL
SODIUM SERPL-SCNC: 139 MMOL/L
WBC # BLD AUTO: 5.25 K/UL

## 2017-03-07 PROCEDURE — 36415 COLL VENOUS BLD VENIPUNCTURE: CPT | Mod: PO

## 2017-03-07 PROCEDURE — 80197 ASSAY OF TACROLIMUS: CPT

## 2017-03-07 PROCEDURE — 80053 COMPREHEN METABOLIC PANEL: CPT

## 2017-03-07 PROCEDURE — 85025 COMPLETE CBC W/AUTO DIFF WBC: CPT

## 2017-03-08 ENCOUNTER — TELEPHONE (OUTPATIENT)
Dept: PRIMARY CARE CLINIC | Facility: CLINIC | Age: 72
End: 2017-03-08

## 2017-03-08 LAB — TACROLIMUS BLD-MCNC: 2.4 NG/ML

## 2017-03-08 NOTE — TELEPHONE ENCOUNTER
----- Message from Merly Banda sent at 3/6/2017  8:20 AM CST -----  Contact: patient  Patient calling in regards to a [facility] f/u in 1 wk. Patient was discharged yesterday, diagnosis- shortness of breath. She prefers a Tuesday or Thursday. Please advise.  Call back .  Thanks!

## 2017-03-09 NOTE — TELEPHONE ENCOUNTER
Spoke w/ pt. Pt was at [facility]. D/c'd 3/5/17. F/u appt made for 8am on 3/15/17 per pt's request w/ CHERELLE Murdock.

## 2017-03-09 NOTE — TELEPHONE ENCOUNTER
Left msg w/ Pooja, pt's dtr for pt to call back. Will offer first available w/ CHERELLE Murdock or Dr Woods when pt calls back.

## 2017-03-10 ENCOUNTER — TELEPHONE (OUTPATIENT)
Dept: TRANSPLANT | Facility: CLINIC | Age: 72
End: 2017-03-10

## 2017-03-10 NOTE — LETTER
March 10, 2017    Dacia Hamilton  [address] [place] [address]          Dear Dacia Amarjittiffanie:  MRN: [id number]    Your lab results were stable.  There are no medicine changes.  Please have your labs drawn again on 6/12/17.      If you cannot have your labs drawn on the scheduled date, it is your responsibility to call the transplant department to reschedule.  To reschedule or make an appointment, please as to speak to or leave a message for my assistant, Kae Hart, at [phone].  When leaving a message for Hailey Pal, or myself, we ask that you leave a brief message regarding your request.    Sincerely,        Your Liver Transplant Coordinator    [facility]  [facility] [place] [address]  [phone]

## 2017-03-15 ENCOUNTER — OFFICE VISIT (OUTPATIENT)
Dept: FAMILY MEDICINE | Facility: CLINIC | Age: 72
End: 2017-03-15
Payer: MEDICARE

## 2017-03-15 VITALS
HEIGHT: 65 IN | SYSTOLIC BLOOD PRESSURE: 120 MMHG | BODY MASS INDEX: 18.57 KG/M2 | WEIGHT: 111.44 LBS | HEART RATE: 56 BPM | TEMPERATURE: 98 F | OXYGEN SATURATION: 97 % | DIASTOLIC BLOOD PRESSURE: 68 MMHG

## 2017-03-15 DIAGNOSIS — Z94.4 LIVER TRANSPLANTED: ICD-10-CM

## 2017-03-15 DIAGNOSIS — J90 BILATERAL PLEURAL EFFUSION: ICD-10-CM

## 2017-03-15 DIAGNOSIS — D84.9 IMMUNOSUPPRESSED STATUS: ICD-10-CM

## 2017-03-15 DIAGNOSIS — I50.33 ACUTE ON CHRONIC DIASTOLIC HEART FAILURE: Primary | ICD-10-CM

## 2017-03-15 DIAGNOSIS — N18.5 CKD (CHRONIC KIDNEY DISEASE) STAGE 5, GFR LESS THAN 15 ML/MIN: ICD-10-CM

## 2017-03-15 DIAGNOSIS — D63.8 ANEMIA OF CHRONIC DISORDER: ICD-10-CM

## 2017-03-15 DIAGNOSIS — I31.39 PERICARDIAL EFFUSION: ICD-10-CM

## 2017-03-15 PROCEDURE — 99499 UNLISTED E&M SERVICE: CPT | Mod: S$PBB,,, | Performed by: NURSE PRACTITIONER

## 2017-03-15 PROCEDURE — 99214 OFFICE O/P EST MOD 30 MIN: CPT | Mod: S$GLB,,, | Performed by: NURSE PRACTITIONER

## 2017-03-15 PROCEDURE — 99999 PR PBB SHADOW E&M-EST. PATIENT-LVL V: CPT | Mod: PBBFAC,,, | Performed by: NURSE PRACTITIONER

## 2017-03-15 RX ORDER — BENZONATATE 100 MG/1
100 CAPSULE ORAL 3 TIMES DAILY PRN
COMMUNITY
End: 2017-10-27

## 2017-03-15 NOTE — PROGRESS NOTES
Subjective:       Patient ID: Dacia Hamilton is a 71 y.o. female.    Chief Complaint: Hospital Follow Up    HPI     Patient, with a history of liver transplant ×3 and kidney transplant ×1, ESRD, CHF, presents to the clinic for hospital follow-up.    Patient was admitted to [facility], after presenting to the ER on 02/28/2017 with complaints of SOB. Upon evaluation in the ED, patient was noted to have significant volume overload with bilateral pleural effusions. Dr. Henderson of pulmonary and Dr. Hitchcock of cardiology were consulted. Patient's nephrologist, Dr. Gofdrey, was consulted to resume the patient on hemodialysis. Patient underwent bilateral thoracentesis, with removal of 1000 ml from the left side and 500 ml from the right side. She has a hx of chronic, diastolic hf. She had a negative LHC, repeat 2D echo to evaluate pericardial effusion to assess for possible pericardiocentesis, however, the effusion was noted to be stable. Patient was discharged home to resume outpatient dialysis and close f/u with pulmonology.   She had a pulmonology follow-up yesterday with a repeat chest xray notable for decreased bibasilar airspace opacities and persistent pleural effusions. She is scheduled for repeat imaging and follow-up in 2 weeks.   She is scheduled for cards follow-up in 2 weeks. She receives HD on M,W,F, and is scheduled this afternoon, established with Dr. Godfrey.   Chronic anemia - scheduled for labs next week with hematology follow-up.   Today, she reports that her sx have greatly improved since she was admitted.  Denies chest pain, SOB, coughing.     Review of Systems   Constitutional: Negative for chills, fever and unexpected weight change.   Respiratory: Negative for cough, shortness of breath and wheezing.    Cardiovascular: Negative for chest pain, palpitations and leg swelling.   Gastrointestinal: Negative for blood in stool, diarrhea, nausea and vomiting.   Skin: Negative for rash and wound.   Neurological:  Negative for dizziness, light-headedness and headaches.       Objective:      Physical Exam   Constitutional: She is oriented to person, place, and time. Vital signs are normal. She appears well-developed. She is cooperative.   HENT:   Head: Normocephalic and atraumatic.   Cardiovascular: Normal rate, regular rhythm and intact distal pulses.    Murmur heard.  Pulmonary/Chest: Effort normal and breath sounds normal. No respiratory distress. She has no wheezes. She has no rales.   Musculoskeletal: Normal range of motion. She exhibits no edema, tenderness or deformity.   Neurological: She is alert and oriented to person, place, and time. No cranial nerve deficit. Coordination normal.   Skin: Skin is warm and dry.   Psychiatric: She has a normal mood and affect. Her behavior is normal.   Nursing note and vitals reviewed.      Assessment:       1. Acute on chronic diastolic heart failure    2. Bilateral pleural effusion    3. Pericardial effusion    4. CKD (chronic kidney disease) stage 5, GFR less than 15 ml/min    5. Liver transplanted    6. Immunosuppressed status    7. Anemia of chronic disorder        Plan:   Dacia was seen today for hospital follow up.    Diagnoses and all orders for this visit:    Acute on chronic diastolic heart failure  Stable. Continue current regimen.   Follow-up with cardiology.     Bilateral pleural effusion  Persistent, stable. Repeat chest xray in 2 weeks for monitoring with pulmonology follow-up.     Pericardial effusion  Stable. Continue current regimen.   Follow-up with cardiology.     CKD (chronic kidney disease) stage 5, GFR less than 15 ml/min  Continue HD. Continue nephrology follow-up.     Liver transplanted  Stable. Continue current regimen.   Follow-up with transplant medicine.    Immunosuppressed status  Stable. Continue current regimen.   Follow-up with transplant medicine    Anemia of chronic disease  Stable. Continue with hematology follow-up.     Transitional Care  Note    Family and/or Caretaker present at visit?  No.  Diagnostic tests reviewed/disposition: I have reviewed all completed as well as pending diagnostic tests at the time of discharge.  Disease/illness education: CHF management discussed.   Home health/community services discussion/referrals: Patient has home health established at with PT, OT, and skilled nursing.   Establishment or re-establishment of referral orders for community resources: HD.   Discussion with other health care providers: Patient scheduled for appropriate follow-up appointments. .

## 2017-03-23 ENCOUNTER — LAB VISIT (OUTPATIENT)
Dept: LAB | Facility: HOSPITAL | Age: 72
End: 2017-03-23
Attending: INTERNAL MEDICINE
Payer: MEDICARE

## 2017-03-23 ENCOUNTER — OFFICE VISIT (OUTPATIENT)
Dept: HEMATOLOGY/ONCOLOGY | Facility: CLINIC | Age: 72
End: 2017-03-23
Payer: MEDICARE

## 2017-03-23 VITALS — BODY MASS INDEX: 18.4 KG/M2 | RESPIRATION RATE: 16 BRPM | WEIGHT: 110.44 LBS | HEIGHT: 65 IN

## 2017-03-23 DIAGNOSIS — I15.9 SECONDARY HYPERTENSION: ICD-10-CM

## 2017-03-23 DIAGNOSIS — J90 BILATERAL PLEURAL EFFUSION: ICD-10-CM

## 2017-03-23 DIAGNOSIS — D84.9 IMMUNOSUPPRESSED STATUS: ICD-10-CM

## 2017-03-23 DIAGNOSIS — D70.2 NEUTROPENIA, DRUG-INDUCED: ICD-10-CM

## 2017-03-23 DIAGNOSIS — D64.9 SEVERE ANEMIA: Primary | ICD-10-CM

## 2017-03-23 DIAGNOSIS — Z94.0 S/P KIDNEY TRANSPLANT: ICD-10-CM

## 2017-03-23 DIAGNOSIS — N18.5 CKD (CHRONIC KIDNEY DISEASE) STAGE 5, GFR LESS THAN 15 ML/MIN: ICD-10-CM

## 2017-03-23 LAB
ALBUMIN SERPL BCP-MCNC: 3.2 G/DL
ALP SERPL-CCNC: 52 U/L
ALT SERPL W/O P-5'-P-CCNC: 22 U/L
ANION GAP SERPL CALC-SCNC: 9 MMOL/L
AST SERPL-CCNC: 21 U/L
BASOPHILS # BLD AUTO: 0.03 K/UL
BASOPHILS NFR BLD: 0.6 %
BILIRUB SERPL-MCNC: 0.4 MG/DL
BUN SERPL-MCNC: 23 MG/DL
CALCIUM SERPL-MCNC: 8.2 MG/DL
CHLORIDE SERPL-SCNC: 94 MMOL/L
CO2 SERPL-SCNC: 36 MMOL/L
CREAT SERPL-MCNC: 2.2 MG/DL
DIFFERENTIAL METHOD: ABNORMAL
EOSINOPHIL # BLD AUTO: 0.6 K/UL
EOSINOPHIL NFR BLD: 12 %
ERYTHROCYTE [DISTWIDTH] IN BLOOD BY AUTOMATED COUNT: 15.7 %
EST. GFR  (AFRICAN AMERICAN): 25 ML/MIN/1.73 M^2
EST. GFR  (NON AFRICAN AMERICAN): 22 ML/MIN/1.73 M^2
GLUCOSE SERPL-MCNC: 80 MG/DL
HCT VFR BLD AUTO: 31.9 %
HGB BLD-MCNC: 9.8 G/DL
LYMPHOCYTES # BLD AUTO: 0.9 K/UL
LYMPHOCYTES NFR BLD: 17.8 %
MCH RBC QN AUTO: 33.2 PG
MCHC RBC AUTO-ENTMCNC: 30.7 %
MCV RBC AUTO: 108 FL
MONOCYTES # BLD AUTO: 0.5 K/UL
MONOCYTES NFR BLD: 10.1 %
NEUTROPHILS # BLD AUTO: 3.1 K/UL
NEUTROPHILS NFR BLD: 59.5 %
NRBC BLD-RTO: 0 /100 WBC
PLATELET # BLD AUTO: 237 K/UL
PMV BLD AUTO: 9.5 FL
POTASSIUM SERPL-SCNC: 4.2 MMOL/L
PROT SERPL-MCNC: 5.8 G/DL
RBC # BLD AUTO: 2.95 M/UL
SODIUM SERPL-SCNC: 139 MMOL/L
WBC # BLD AUTO: 5.27 K/UL

## 2017-03-23 PROCEDURE — 36415 COLL VENOUS BLD VENIPUNCTURE: CPT | Mod: PN

## 2017-03-23 PROCEDURE — 1159F MED LIST DOCD IN RCRD: CPT | Mod: S$GLB,,, | Performed by: INTERNAL MEDICINE

## 2017-03-23 PROCEDURE — 1157F ADVNC CARE PLAN IN RCRD: CPT | Mod: S$GLB,,, | Performed by: INTERNAL MEDICINE

## 2017-03-23 PROCEDURE — 80053 COMPREHEN METABOLIC PANEL: CPT

## 2017-03-23 PROCEDURE — 85025 COMPLETE CBC W/AUTO DIFF WBC: CPT | Mod: PN

## 2017-03-23 PROCEDURE — 99499 UNLISTED E&M SERVICE: CPT | Mod: S$PBB,,, | Performed by: INTERNAL MEDICINE

## 2017-03-23 PROCEDURE — 85025 COMPLETE CBC W/AUTO DIFF WBC: CPT

## 2017-03-23 PROCEDURE — 1126F AMNT PAIN NOTED NONE PRSNT: CPT | Mod: S$GLB,,, | Performed by: INTERNAL MEDICINE

## 2017-03-23 PROCEDURE — 99214 OFFICE O/P EST MOD 30 MIN: CPT | Mod: S$GLB,,, | Performed by: INTERNAL MEDICINE

## 2017-03-23 PROCEDURE — 1160F RVW MEDS BY RX/DR IN RCRD: CPT | Mod: S$GLB,,, | Performed by: INTERNAL MEDICINE

## 2017-03-23 PROCEDURE — 80053 COMPREHEN METABOLIC PANEL: CPT | Mod: PN

## 2017-03-23 PROCEDURE — 99999 PR PBB SHADOW E&M-EST. PATIENT-LVL II: CPT | Mod: PBBFAC,,, | Performed by: INTERNAL MEDICINE

## 2017-03-23 NOTE — PROGRESS NOTES
Subjective:       Patient ID: Dacia Hamilton is a 71 y.o. female.    Chief Complaint: rituxan  HPI:   oN Rituxan maintenance for hemolytic anemia. wentr to ED dx with the Flu and on Tamiflu since last week.   Receiving dialysis , taken off myfortil for renal immunosuppression to help alleviate anemia some. Was delayed by 1 week due to flu, here for rx today  REVIEW OF SYSTEMS:     No issues on 14 point review today. Feeling great    PHYSICAL EXAM:     Vitals:    03/23/17 1027   Resp: 16       GENERAL: Comfortable looking patient. Patient is in no distress.  Awake, alert and oriented to time, person and place.  No anxiety, or agitation.      HEENT: Normal conjunctivae and eyelids. WNL.  PERRLA 3 to 4 mm. No icterus, no pallor, no congestion, and no discharge noted.     NECK:  Supple. Trachea is central.  No crepitus.  No JVD or masses.    RESPIRATORY:  No intercostal retractions.  No dullness to percussion.  Chest is clear to auscultation.  No rales, rhonchi or wheezes.  No crepitus.  Good air entry bilaterally.    CARDIOVASCULAR:  S1 and S2 are normally heard without murmurs or gallops.  All peripheral pulses are present.    ABDOMEN:  Normal abdomen.  No hepatosplenomegaly.  No free fluid.  Bowel sounds are present.  No hernia noted. No masses.  No rebound or tenderness.  No guarding or rigidity.  Umbilicus is midline.    LYMPHATICS:  No axillary, cervical, supraclavicular, submental, or inguinal lymphadenopathy.    SKIN/MUSCULOSKELETAL:  There is no evidence of excoriation marks or ecchmosis.  No rashes.  No cyanosis.  No clubbing.  No joint or skeletal deformities noted.  Normal range of motion.    NEUROLOGIC:  Higher functions are appropriate.  No cranial nerve deficits.  Normal fadia.  Normal strength.  Motor and sensory functions are normal.  Deep tendon reflexes are normal.    GENITAL/RECTAL:  Exams are deferred.      Laboratory:     CBC:  Lab Results   Component Value Date    WBC 5.27 03/23/2017    RBC 2.95  (L) 03/23/2017    HGB 9.8 (L) 03/23/2017    HCT 31.9 (L) 03/23/2017     (H) 03/23/2017    MCH 33.2 (H) 03/23/2017    MCHC 30.7 (L) 03/23/2017    RDW 15.7 (H) 03/23/2017     03/23/2017    MPV 9.5 03/23/2017    GRAN 3.1 03/23/2017    GRAN 59.5 03/23/2017    LYMPH 0.9 (L) 03/23/2017    LYMPH 17.8 (L) 03/23/2017    MONO 0.5 03/23/2017    MONO 10.1 03/23/2017    EOS 0.6 (H) 03/23/2017    BASO 0.03 03/23/2017    EOSINOPHIL 12.0 (H) 03/23/2017    BASOPHIL 0.6 03/23/2017       BMP: BMP  Lab Results   Component Value Date     03/23/2017    K 4.2 03/23/2017    CL 94 (L) 03/23/2017    CO2 36 (H) 03/23/2017    BUN 23 (H) 03/23/2017    CREATININE 2.20 (H) 03/23/2017    CALCIUM 8.2 (L) 03/23/2017    ANIONGAP 9 03/23/2017    ESTGFRAFRICA 25 (A) 03/23/2017    EGFRNONAA 22 (A) 03/23/2017       LFT:   Lab Results   Component Value Date    ALT 22 03/23/2017    AST 21 03/23/2017    GGT 25 09/02/2014    ALKPHOS 52 03/23/2017    BILITOT 0.4 03/23/2017         Assessment/Plan:       No diagnosis found.    Getting AV access placed next week for dialysis.   Receiving epogen during dialysis  Changed rituxan to q month but her counts are holding well. The hemolysis may have been related to immunosuppressive agents used for the kidney, will delay by 2 weeks and see if counts are still holding and get labs from dialysis to add haptoglobin , may be able to dc rituxan if no longer hemolysisng

## 2017-03-28 DIAGNOSIS — D70.2 IDIOSYNCRATIC NEUTROPENIA: Primary | ICD-10-CM

## 2017-04-03 ENCOUNTER — LAB VISIT (OUTPATIENT)
Dept: LAB | Facility: HOSPITAL | Age: 72
End: 2017-04-03
Attending: INTERNAL MEDICINE
Payer: MEDICARE

## 2017-04-03 DIAGNOSIS — D70.2 IDIOSYNCRATIC NEUTROPENIA: ICD-10-CM

## 2017-04-03 DIAGNOSIS — D64.9 SEVERE ANEMIA: ICD-10-CM

## 2017-04-03 LAB
ALBUMIN SERPL BCP-MCNC: 3.8 G/DL
ALP SERPL-CCNC: 57 U/L
ALT SERPL W/O P-5'-P-CCNC: 34 U/L
ANION GAP SERPL CALC-SCNC: 12 MMOL/L
AST SERPL-CCNC: 25 U/L
BASOPHILS # BLD AUTO: 0.01 K/UL
BASOPHILS NFR BLD: 0.2 %
BILIRUB SERPL-MCNC: 0.6 MG/DL
BUN SERPL-MCNC: 50 MG/DL
CALCIUM SERPL-MCNC: 8.5 MG/DL
CHLORIDE SERPL-SCNC: 94 MMOL/L
CO2 SERPL-SCNC: 33 MMOL/L
CREAT SERPL-MCNC: 4.01 MG/DL
DIFFERENTIAL METHOD: ABNORMAL
EOSINOPHIL # BLD AUTO: 0.5 K/UL
EOSINOPHIL NFR BLD: 7.7 %
ERYTHROCYTE [DISTWIDTH] IN BLOOD BY AUTOMATED COUNT: 15.9 %
EST. GFR  (AFRICAN AMERICAN): 12 ML/MIN/1.73 M^2
EST. GFR  (NON AFRICAN AMERICAN): 10 ML/MIN/1.73 M^2
GLUCOSE SERPL-MCNC: 82 MG/DL
HAPTOGLOB SERPL-MCNC: 63 MG/DL
HCT VFR BLD AUTO: 31.7 %
HGB BLD-MCNC: 9.8 G/DL
LYMPHOCYTES # BLD AUTO: 0.9 K/UL
LYMPHOCYTES NFR BLD: 16.1 %
MCH RBC QN AUTO: 33.4 PG
MCHC RBC AUTO-ENTMCNC: 30.9 %
MCV RBC AUTO: 108 FL
MONOCYTES # BLD AUTO: 0.6 K/UL
MONOCYTES NFR BLD: 9.8 %
NEUTROPHILS # BLD AUTO: 3.9 K/UL
NEUTROPHILS NFR BLD: 66.2 %
NRBC BLD-RTO: 0 /100 WBC
PLATELET # BLD AUTO: 250 K/UL
PMV BLD AUTO: 9.4 FL
POTASSIUM SERPL-SCNC: 5.4 MMOL/L
PROT SERPL-MCNC: 6.5 G/DL
RBC # BLD AUTO: 2.93 M/UL
SODIUM SERPL-SCNC: 139 MMOL/L
WBC # BLD AUTO: 5.84 K/UL

## 2017-04-03 PROCEDURE — 85025 COMPLETE CBC W/AUTO DIFF WBC: CPT | Mod: PN

## 2017-04-03 PROCEDURE — 85025 COMPLETE CBC W/AUTO DIFF WBC: CPT

## 2017-04-03 PROCEDURE — 36415 COLL VENOUS BLD VENIPUNCTURE: CPT | Mod: PN

## 2017-04-03 PROCEDURE — 80053 COMPREHEN METABOLIC PANEL: CPT | Mod: PN

## 2017-04-03 PROCEDURE — 80053 COMPREHEN METABOLIC PANEL: CPT

## 2017-04-03 PROCEDURE — 83010 ASSAY OF HAPTOGLOBIN QUANT: CPT

## 2017-04-05 DIAGNOSIS — Z94.0 H/O KIDNEY TRANSPLANT: ICD-10-CM

## 2017-04-06 ENCOUNTER — TELEPHONE (OUTPATIENT)
Dept: INFUSION THERAPY | Facility: HOSPITAL | Age: 72
End: 2017-04-06

## 2017-04-06 ENCOUNTER — OFFICE VISIT (OUTPATIENT)
Dept: HEMATOLOGY/ONCOLOGY | Facility: CLINIC | Age: 72
End: 2017-04-06
Payer: MEDICARE

## 2017-04-06 VITALS
HEIGHT: 65 IN | WEIGHT: 109.81 LBS | BODY MASS INDEX: 18.3 KG/M2 | SYSTOLIC BLOOD PRESSURE: 143 MMHG | HEART RATE: 62 BPM | RESPIRATION RATE: 17 BRPM | DIASTOLIC BLOOD PRESSURE: 65 MMHG

## 2017-04-06 DIAGNOSIS — D63.1 ANEMIA IN CKD (CHRONIC KIDNEY DISEASE): Primary | ICD-10-CM

## 2017-04-06 DIAGNOSIS — D63.8 ANEMIA OF CHRONIC DISORDER: ICD-10-CM

## 2017-04-06 DIAGNOSIS — Z94.0 S/P KIDNEY TRANSPLANT: ICD-10-CM

## 2017-04-06 DIAGNOSIS — J90 BILATERAL PLEURAL EFFUSION: ICD-10-CM

## 2017-04-06 DIAGNOSIS — N18.9 ANEMIA IN CKD (CHRONIC KIDNEY DISEASE): Primary | ICD-10-CM

## 2017-04-06 DIAGNOSIS — N18.5 CKD (CHRONIC KIDNEY DISEASE) STAGE 5, GFR LESS THAN 15 ML/MIN: ICD-10-CM

## 2017-04-06 PROCEDURE — 99999 PR PBB SHADOW E&M-EST. PATIENT-LVL III: CPT | Mod: PBBFAC,,, | Performed by: INTERNAL MEDICINE

## 2017-04-06 PROCEDURE — 1157F ADVNC CARE PLAN IN RCRD: CPT | Mod: S$GLB,,, | Performed by: INTERNAL MEDICINE

## 2017-04-06 PROCEDURE — 1160F RVW MEDS BY RX/DR IN RCRD: CPT | Mod: S$GLB,,, | Performed by: INTERNAL MEDICINE

## 2017-04-06 PROCEDURE — 99499 UNLISTED E&M SERVICE: CPT | Mod: S$PBB,,, | Performed by: INTERNAL MEDICINE

## 2017-04-06 PROCEDURE — 1126F AMNT PAIN NOTED NONE PRSNT: CPT | Mod: S$GLB,,, | Performed by: INTERNAL MEDICINE

## 2017-04-06 PROCEDURE — 99214 OFFICE O/P EST MOD 30 MIN: CPT | Mod: S$GLB,,, | Performed by: INTERNAL MEDICINE

## 2017-04-06 PROCEDURE — 1159F MED LIST DOCD IN RCRD: CPT | Mod: S$GLB,,, | Performed by: INTERNAL MEDICINE

## 2017-04-06 RX ORDER — LIDOCAINE AND PRILOCAINE 25; 25 MG/G; MG/G
CREAM TOPICAL
Refills: 3 | COMMUNITY
Start: 2017-03-26

## 2017-04-06 RX ORDER — TACROLIMUS 1 MG/1
CAPSULE ORAL
Qty: 90 CAPSULE | Refills: 7 | OUTPATIENT
Start: 2017-04-06

## 2017-04-06 NOTE — PROGRESS NOTES
Subjective:       Patient ID: Dacia Hamilton is a 71 y.o. female.    Chief Complaint: rituxan  HPI:   oN Rituxan maintenance for hemolytic anemia. wentr to ED dx with the Flu and on Tamiflu since last week.   Receiving dialysis , taken off myfortil for renal immunosuppression to help alleviate anemia some. Was delayed by 1 week due to flu, here for rx today  REVIEW OF SYSTEMS:     No issues on 14 point review today. Feeling greatAV access placed recently slightly painful with manipulation    PHYSICAL EXAM:     Vitals:    04/06/17 1046   BP: (!) 143/65   Pulse: 62   Resp: 17       GENERAL: Comfortable looking patient. Patient is in no distress.  Awake, alert and oriented to time, person and place.  No anxiety, or agitation.      HEENT: Normal conjunctivae and eyelids. WNL.  PERRLA 3 to 4 mm. No icterus, no pallor, no congestion, and no discharge noted.     NECK:  Supple. Trachea is central.  No crepitus.  No JVD or masses.    RESPIRATORY:  No intercostal retractions.  No dullness to percussion.  Chest is clear to auscultation.  No rales, rhonchi or wheezes.  No crepitus.  Good air entry bilaterally.    CARDIOVASCULAR:  S1 and S2 are normally heard without murmurs or gallops.  All peripheral pulses are present.    ABDOMEN:  Normal abdomen.  No hepatosplenomegaly.  No free fluid.  Bowel sounds are present.  No hernia noted. No masses.  No rebound or tenderness.  No guarding or rigidity.  Umbilicus is midline.    LYMPHATICS:  No axillary, cervical, supraclavicular, submental, or inguinal lymphadenopathy.    SKIN/MUSCULOSKELETAL:  There is no evidence of excoriation marks or ecchmosis.  No rashes.  No cyanosis.  No clubbing.  No joint or skeletal deformities noted.  Normal range of motion.    NEUROLOGIC:  Higher functions are appropriate.  No cranial nerve deficits.  Normal fadia.  Normal strength.  Motor and sensory functions are normal.  Deep tendon reflexes are normal.    GENITAL/RECTAL:  Exams are deferred.       Laboratory:     CBC:  Lab Results   Component Value Date    WBC 5.84 04/03/2017    RBC 2.93 (L) 04/03/2017    HGB 9.8 (L) 04/03/2017    HCT 31.7 (L) 04/03/2017     (H) 04/03/2017    MCH 33.4 (H) 04/03/2017    MCHC 30.9 (L) 04/03/2017    RDW 15.9 (H) 04/03/2017     04/03/2017    MPV 9.4 04/03/2017    GRAN 3.9 04/03/2017    GRAN 66.2 04/03/2017    LYMPH 0.9 (L) 04/03/2017    LYMPH 16.1 (L) 04/03/2017    MONO 0.6 04/03/2017    MONO 9.8 04/03/2017    EOS 0.5 04/03/2017    BASO 0.01 04/03/2017    EOSINOPHIL 7.7 04/03/2017    BASOPHIL 0.2 04/03/2017       BMP: BMP  Lab Results   Component Value Date     04/03/2017    K 5.4 (H) 04/03/2017    CL 94 (L) 04/03/2017    CO2 33 (H) 04/03/2017    BUN 50 (H) 04/03/2017    CREATININE 4.01 (H) 04/03/2017    CALCIUM 8.5 04/03/2017    ANIONGAP 12 04/03/2017    ESTGFRAFRICA 12 (A) 04/03/2017    EGFRNONAA 10 (A) 04/03/2017       LFT:   Lab Results   Component Value Date    ALT 34 04/03/2017    AST 25 04/03/2017    GGT 25 09/02/2014    ALKPHOS 57 04/03/2017    BILITOT 0.6 04/03/2017         Assessment/Plan:       1. Anemia in CKD (chronic kidney disease)    2. Anemia of chronic disorder    3. Bilateral pleural effusion    4. CKD (chronic kidney disease) stage 5, GFR less than 15 ml/min    5. S/P kidney transplant        Has AV graft in place .   hgb  Holding at 9,8  no rituxan this time .   Will delay 1 month and review counts  Receiving epogen during dialysis   The hemolysis may have been related to immunosuppressive agents used for the kidney,  Maybe dc rituxan?,

## 2017-04-06 NOTE — MR AVS SNAPSHOT
[facility] Hematology  [address]  [facility] [address]  Phone: [phone]  Fax: [phone]                  Dacia Hamilton   2017 11:00 AM   Office Visit    Description:  Female : 1945   Provider:  Kaylan Metz MD   Department:  [facility]                To Do List           Future Appointments        Provider Department Dept Phone    2017 11:00 AM Kaylan Metz MD [facility] [phone]    2017 11:30 AM CHAIR 34, [facility] CHEMO [facility] [phone]    2017 11:30 AM CHAIR 08, [facility] CHE[facility] [phone]    2017 11:30 AM CHAIR 34, [facility] CHE[facility] [phone]    2017 8:30 AM LAB, [place] [facility] [phone]      Goals (5 Years of Data)     None      [facility] On Call     [facility] Nurse Care Line -  Assistance  Unless otherwise directed by your provider, please contact [facility], our nurse care line that is available for  assistance.     Registered nurses in the [facility] provide: appointment scheduling, clinical advisement, health education, and other advisory services.  Call: [phone] (toll free)               Medications           Message regarding Medications     Verify the changes and/or additions to your medication regime listed below are the same as discussed with your clinician today.  If any of these changes or additions are incorrect, please notify your healthcare provider.             Verify that the below list of medications is an accurate representation of the medications you are currently taking.  If none reported, the list may be blank. If incorrect, please contact your healthcare provider. Carry this list with you in case of emergency.           Current Medications     alprazolam (XANAX) 0.25 MG tablet One po prn anxiety not to exceed one per day    amlodipine  (NORVASC) 5 MG tablet TAKE ONE TABLET BY MOUTH TWICE DAILY    aspirin (ECOTRIN) 81 MG EC tablet Take 1 tablet (81 mg total) by mouth once daily.    benzonatate (TESSALON) 100 MG capsule Take 100 mg by mouth 3 (three) times daily as needed for Cough.    cloNIDine 0.1 mg/24 hr td ptwk (CATAPRES) 0.1 mg/24 hr Place 1 patch onto the skin every 7 days.    fish oil-omega-3 fatty acids 300-1,000 mg capsule Take 2 g by mouth once daily.      FOLBEE PLUS 5 mg Tab Take 1 tablet by mouth every evening.    folic acid (FOLVITE) 400 MCG tablet Take 800 mcg by mouth once daily.    furosemide (LASIX) 80 MG tablet Take 1 tablet (80 mg total) by mouth once daily.    levothyroxine (SYNTHROID) 75 MCG tablet Take 75 mcg by mouth once daily.    lidocaine-prilocaine (EMLA) cream APPLY TO AFFECTED AREA (FISTULA) 1 HOUR PRIOR TO HD    losartan (COZAAR) 50 MG tablet One po at noon    magnesium oxide (MAG-OX) 400 mg tablet Take 1 tablet (400 mg total) by mouth 3 (three) times daily.    metoprolol tartrate (LOPRESSOR) 50 MG tablet Take 1 tablet (50 mg total) by mouth 2 (two) times daily.    multivitamin capsule Every morning    omeprazole (PRILOSEC) 20 MG capsule TAKE ONE CAPSULE BY MOUTH ONE TIME DAILY    phenyleph-min oil-petrolatum (PREPARATION H) 0.25-14-74.9 % Oint Place 1 applicator rectally 4 (four) times daily as needed.    promethazine (PHENERGAN) 12.5 MG Tab Take 1 tablet (12.5 mg total) by mouth every 6 (six) hours as needed (nausea). May make you sleep    PROPYLENE GLYCOL/ (SYSTANE, PROPYLENE GLYCOL, OPHT) Apply to eye.    RITUXIMAB (RITUXAN IV) Inject into the vein once a week. Administered at [facility]    sodium bicarbonate 650 MG tablet Take 3 tablets (1,950 mg total) by mouth 2 (two) times daily.    tacrolimus (PROGRAF) 1 MG Cap Take 1 capsule (1 mg total) by mouth every 12 (twelve) hours.    zolpidem (AMBIEN) 5 MG Tab Take 1 tablet (5 mg total) by mouth nightly as needed (insomnia). At bedtime           Clinical  "Reference Information           Your Vitals Were     BP Pulse Resp Height Weight BMI    143/65 62 17 5' 5" (1.651 m) 49.8 kg (109 lb 12.6 oz) 18.27 kg/m2      Blood Pressure          Most Recent Value    BP  (!)  143/65      Allergies as of 4/6/2017     Flonase [Fluticasone]    Banana      Immunizations Administered on Date of Encounter - 4/6/2017     None      Maintenance Dialysis History     Start End Type Comments Center    1/8/1888  [facility]            Current Dialysis Center Information     [facility] [address] Phone #:  [phone]    Contact:  [place]  [address] Fax #:  [phone]            Transplant Information        Txp Date Organ Coordinator Care Team    6/4/2006 Liver Lorin Mejia RN Surgeon:  Brendan Amor MD   Assisting Surgeon:  Victor Hugo Mcintosh Jr., MD   Referring Physician:  Syeda Tong MD   Current Hepatologist:  Victor Hugo Wise MD   Corresponding Physician:  David Woods MD   Current PCP:  David Woods MD          Txp Date Organ Coordinator Care Team    6/4/2006 Kidney Lorin Mejia RN Surgeon:  Brendan Amor MD   Referring Physician:  Syeda Tong MD         Language Assistance Services     ATTENTION: Language assistance services are available, free of charge. Please call [phone].      ATENCIÓN: Si habla español, tiene a barnett disposición servicios gratuitos de asistencia lingüística. Llame al [phone].     [facility] Ý: N?u b?n nói Ti?ng Vi?t, có các d?ch v? h? tr? ngôn ng? mi?n phí dành cho b?n. G?i s? [phone].         [facility] complies with applicable Federal civil rights laws and does not discriminate on the basis of race, color, national origin, age, disability, or sex.        "

## 2017-04-06 NOTE — TELEPHONE ENCOUNTER
Phone call from Griselda Rodriguez LPN patient is being held for her Rituxan treatment today 4/6/17 per Dr. Metz.

## 2017-04-07 DIAGNOSIS — Z94.0 H/O KIDNEY TRANSPLANT: ICD-10-CM

## 2017-04-07 RX ORDER — TACROLIMUS 1 MG/1
1 CAPSULE ORAL EVERY 12 HOURS
Qty: 180 CAPSULE | Refills: 3 | Status: SHIPPED | OUTPATIENT
Start: 2017-04-07 | End: 2018-04-05 | Stop reason: SDUPTHER

## 2017-04-11 RX ORDER — CLONIDINE 0.1 MG/24H
1 PATCH, EXTENDED RELEASE TRANSDERMAL
Qty: 12 PATCH | Refills: 6 | Status: SHIPPED | OUTPATIENT
Start: 2017-04-11 | End: 2017-08-03

## 2017-04-11 RX ORDER — FUROSEMIDE 80 MG/1
80 TABLET ORAL DAILY
Qty: 90 TABLET | Refills: 3 | Status: SHIPPED | OUTPATIENT
Start: 2017-04-11 | End: 2017-08-30

## 2017-04-17 ENCOUNTER — TELEPHONE (OUTPATIENT)
Dept: CARDIOLOGY | Facility: CLINIC | Age: 72
End: 2017-04-17

## 2017-04-17 NOTE — TELEPHONE ENCOUNTER
----- Message from Salma Borges sent at 4/17/2017  9:46 AM CDT -----  Patient states that the office canceled her appointment and need to see the doctor as soon as possible for a f/u visit from [facility].  Call [phone].

## 2017-04-18 ENCOUNTER — TELEPHONE (OUTPATIENT)
Dept: FAMILY MEDICINE | Facility: CLINIC | Age: 72
End: 2017-04-18

## 2017-04-18 ENCOUNTER — TELEPHONE (OUTPATIENT)
Dept: TRANSPLANT | Facility: CLINIC | Age: 72
End: 2017-04-18

## 2017-04-18 PROBLEM — D62 ANEMIA DUE TO ACUTE BLOOD LOSS: Status: ACTIVE | Noted: 2017-04-18

## 2017-04-18 PROBLEM — K92.1 GASTROINTESTINAL HEMORRHAGE WITH MELENA: Status: ACTIVE | Noted: 2017-04-18

## 2017-04-18 NOTE — TELEPHONE ENCOUNTER
Pt is requests if she qualifies for handicap license plate with past medical history. Please advise.

## 2017-04-18 NOTE — TELEPHONE ENCOUNTER
----- Message from Stephanie Florez sent at 4/18/2017  1:12 PM CDT -----  Contact: pt  Pt says that she had a black bowel movement and want to know what to do. Please call back at [phone]

## 2017-04-18 NOTE — TELEPHONE ENCOUNTER
----- Message from Malathi Dove sent at 4/18/2017 11:39 AM CDT -----  Requesting to discuss how to get  a handicap tag for car / call [phone]

## 2017-04-18 NOTE — TELEPHONE ENCOUNTER
Spoke with pt, states she started with dark, tarry stools today. States she feels very tired and weak.  Pt agreed to go to local ED for eval.

## 2017-04-18 NOTE — TELEPHONE ENCOUNTER
----- Message from Malathi Dove sent at 4/18/2017 11:39 AM CDT -----  Requesting to discuss how to get  a handicap tag for car / call [phone]

## 2017-04-21 ENCOUNTER — TELEPHONE (OUTPATIENT)
Dept: GASTROENTEROLOGY | Facility: CLINIC | Age: 72
End: 2017-04-21

## 2017-04-21 PROBLEM — K29.50 ANTRITIS (STOMACH): Status: ACTIVE | Noted: 2017-04-21

## 2017-04-21 NOTE — TELEPHONE ENCOUNTER
----- Message from Salma Jony sent at 4/21/2017  4:28 PM CDT -----  Patient states that she need to schedule a hospital f/u visit in 2 weeks from [facility].  Please call patient at [phone].

## 2017-04-24 RX ORDER — LEVOTHYROXINE SODIUM 75 UG/1
75 TABLET ORAL DAILY
Qty: 30 TABLET | Refills: 4 | Status: SHIPPED | OUTPATIENT
Start: 2017-04-24 | End: 2017-07-13 | Stop reason: SDUPTHER

## 2017-04-24 NOTE — TELEPHONE ENCOUNTER
----- Message from Griffin Malik sent at 4/24/2017 11:59 AM CDT -----  Contact: [facility]  Pharmacy - [phone]  Pharmacy called and requested  Clarity on med levothyroxine (SYNTHROID) 75 MCG tablet. Stated it was cancelled but patient should still be taking med   - Please call to advise and if cancelled in error please reauth - Reach pharmacy at [phone]

## 2017-04-25 ENCOUNTER — TELEPHONE (OUTPATIENT)
Dept: FAMILY MEDICINE | Facility: HOSPITAL | Age: 72
End: 2017-04-25

## 2017-04-25 NOTE — TELEPHONE ENCOUNTER
I spoke with her. I agree with stopping ASA. She had a GI Bleed. Plavix should be sufficient for her stent.

## 2017-05-01 ENCOUNTER — TELEPHONE (OUTPATIENT)
Dept: GASTROENTEROLOGY | Facility: HOSPITAL | Age: 72
End: 2017-05-01

## 2017-05-01 DIAGNOSIS — K92.2 GASTROINTESTINAL HEMORRHAGE, UNSPECIFIED GASTROINTESTINAL HEMORRHAGE TYPE: Primary | ICD-10-CM

## 2017-05-01 NOTE — TELEPHONE ENCOUNTER
Cbc and retic orders added to lab appt    appt scheduled for Agnes Ulloa NP on 5/18/17  verifying with Dr Gibbons whether this is soon enough

## 2017-05-01 NOTE — TELEPHONE ENCOUNTER
Ms. Silveira has labs scheduled for Thurs 5/4.  Please add to that:  CBC, Retic count.    She also needs an appt with ENT (Ms. Ulloa) for a small papilloma seen at endoscopy.    She has appts with card on 5/2 and heme/onc 5/4, and with GI next week.

## 2017-05-02 ENCOUNTER — PATIENT MESSAGE (OUTPATIENT)
Dept: NEPHROLOGY | Facility: CLINIC | Age: 72
End: 2017-05-02

## 2017-05-03 ENCOUNTER — OFFICE VISIT (OUTPATIENT)
Dept: CARDIOLOGY | Facility: CLINIC | Age: 72
End: 2017-05-03
Payer: MEDICARE

## 2017-05-03 VITALS
SYSTOLIC BLOOD PRESSURE: 162 MMHG | BODY MASS INDEX: 18.18 KG/M2 | HEIGHT: 65 IN | HEART RATE: 57 BPM | DIASTOLIC BLOOD PRESSURE: 66 MMHG | WEIGHT: 109.13 LBS

## 2017-05-03 DIAGNOSIS — J90 BILATERAL PLEURAL EFFUSION: ICD-10-CM

## 2017-05-03 DIAGNOSIS — K92.1 GASTROINTESTINAL HEMORRHAGE WITH MELENA: ICD-10-CM

## 2017-05-03 DIAGNOSIS — N18.6 ESRD (END STAGE RENAL DISEASE): Primary | ICD-10-CM

## 2017-05-03 DIAGNOSIS — Z94.4 LIVER TRANSPLANTED: ICD-10-CM

## 2017-05-03 DIAGNOSIS — T86.11 ACUTE REJECTION OF KIDNEY TRANSPLANT: ICD-10-CM

## 2017-05-03 DIAGNOSIS — I31.39 PERICARDIAL EFFUSION: ICD-10-CM

## 2017-05-03 DIAGNOSIS — R06.09 DYSPNEA ON EXERTION: ICD-10-CM

## 2017-05-03 DIAGNOSIS — I50.32 CHRONIC DIASTOLIC CONGESTIVE HEART FAILURE: ICD-10-CM

## 2017-05-03 PROCEDURE — 99499 UNLISTED E&M SERVICE: CPT | Mod: S$PBB,,, | Performed by: INTERNAL MEDICINE

## 2017-05-03 PROCEDURE — 99999 PR PBB SHADOW E&M-EST. PATIENT-LVL III: CPT | Mod: PBBFAC,,, | Performed by: INTERNAL MEDICINE

## 2017-05-03 PROCEDURE — 1157F ADVNC CARE PLAN IN RCRD: CPT | Mod: S$GLB,,, | Performed by: INTERNAL MEDICINE

## 2017-05-03 PROCEDURE — 1159F MED LIST DOCD IN RCRD: CPT | Mod: S$GLB,,, | Performed by: INTERNAL MEDICINE

## 2017-05-03 PROCEDURE — 99214 OFFICE O/P EST MOD 30 MIN: CPT | Mod: S$GLB,,, | Performed by: INTERNAL MEDICINE

## 2017-05-03 PROCEDURE — 1126F AMNT PAIN NOTED NONE PRSNT: CPT | Mod: S$GLB,,, | Performed by: INTERNAL MEDICINE

## 2017-05-03 PROCEDURE — 1160F RVW MEDS BY RX/DR IN RCRD: CPT | Mod: S$GLB,,, | Performed by: INTERNAL MEDICINE

## 2017-05-03 NOTE — LETTER
May 3, 2017      David Woods MD  [address]  [facility] [address]           [facility] Cardiology  [address] [facility] [address] [place] [address]  Phone: [phone]          Patient: Dacia Hamilton   MR Number: [id number]   YOB: 1945   Date of Visit: 5/3/2017       Dear Dr. David Woods:    Thank you for referring Dacia Hamilton to me for evaluation. Attached you will find relevant portions of my assessment and plan of care.    If you have questions, please do not hesitate to call me. I look forward to following Dacia Hamilton along with you.    Sincerely,    Fco Ball MD    Enclosure  CC:  No Recipients    If you would like to receive this communication electronically, please contact [email or web address] or [phone] to request more information on [employer] Link access.    For providers and/or their staff who would like to refer a patient to [facility], please contact us through our one-stop-shop provider referral line, [facility], at [phone].    If you feel you have received this communication in error or would no longer like to receive these types of communications, please e-mail [email or web address]

## 2017-05-03 NOTE — PROGRESS NOTES
Subjective:    Patient ID:  Dacia Hamilton is a 71 y.o. female who presents for follow-up of Congestive Heart Failure (post hosp f/u CHF (fluid around heart)) and Hypertension      HPI Comments: Pt here for hospital f/u. Pt seen by Dr. Kellogg in December for clearance for dialysis shunt placement. Her most recent hospitalization was for upper GI bleed. She had some cardiac issues identified and had a coronary angiogram showing non-obstructive CAD. She has had a chronic pericardial effusion noted since at least November which remained unchanged without evidence of hemodynamic compromise. Presently she reports doing fairly well and has no new or specific complaints.       Review of Systems   Constitution: Negative for weight gain and weight loss.   HENT: Negative.    Eyes: Negative.    Cardiovascular: Positive for dyspnea on exertion. Negative for chest pain, claudication, cyanosis, irregular heartbeat, leg swelling, near-syncope, orthopnea (no PND), palpitations and syncope.   Respiratory: Positive for shortness of breath. Negative for cough, hemoptysis and snoring.    Endocrine: Negative.    Skin: Negative.    Musculoskeletal: Negative for joint pain, muscle cramps, muscle weakness and myalgias.   Gastrointestinal: Negative for diarrhea, hematemesis, nausea and vomiting.   Genitourinary: Negative.    Neurological: Negative for dizziness, focal weakness, light-headedness, loss of balance, numbness, paresthesias and seizures.   Psychiatric/Behavioral: Negative.         Objective:    Physical Exam   Constitutional: She is oriented to person, place, and time. She appears well-developed and well-nourished.   Eyes: Pupils are equal, round, and reactive to light.   Neck: Normal range of motion. No thyromegaly present.   Cardiovascular: Normal rate, regular rhythm, S1 normal, S2 normal, intact distal pulses and normal pulses.   No extrasystoles are present. PMI is not displaced.  Exam reveals no friction rub.    Murmur  heard.  High-pitched blowing holosystolic murmur is present with a grade of 2/6  at the apex Can hear to and fro bruit from L arm AV shunt across the precordium.  Pulmonary/Chest: Effort normal and breath sounds normal. She has no wheezes. She has no rales. She exhibits no tenderness.   Abdominal: Soft. Bowel sounds are normal. She exhibits no distension and no mass. There is no tenderness.   Musculoskeletal: Normal range of motion. She exhibits no edema.   Neurological: She is alert and oriented to person, place, and time.   Skin: Skin is warm and dry.   Vitals reviewed.      Test(s) Reviewed  I have reviewed the following in detail:  [] Stress test   [x] Angiography   [x] Echocardiogram x3   [x] Labs   [x] Other:  Hospital records       Assessment:       1. ESRD (end stage renal disease)    2. Pericardial effusion    3. Chronic diastolic congestive heart failure    4. Dyspnea on exertion    5. Bilateral pleural effusion    6. 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 Liver transplanted    8. Gastrointestinal hemorrhage with melena         Plan:       We discussed all the issues and that the small to moderate pericardial effusion was related to the renal disease and not causing any hemodynamic instability.  Continue present Rx  Will have her f/u 4-6 months or sooner if new problems arise

## 2017-05-04 ENCOUNTER — OFFICE VISIT (OUTPATIENT)
Dept: HEMATOLOGY/ONCOLOGY | Facility: CLINIC | Age: 72
End: 2017-05-04
Payer: MEDICARE

## 2017-05-04 ENCOUNTER — LAB VISIT (OUTPATIENT)
Dept: LAB | Facility: HOSPITAL | Age: 72
End: 2017-05-04
Attending: INTERNAL MEDICINE
Payer: MEDICARE

## 2017-05-04 VITALS
TEMPERATURE: 98 F | HEIGHT: 65 IN | DIASTOLIC BLOOD PRESSURE: 62 MMHG | WEIGHT: 109.81 LBS | BODY MASS INDEX: 18.3 KG/M2 | RESPIRATION RATE: 16 BRPM | HEART RATE: 55 BPM | SYSTOLIC BLOOD PRESSURE: 135 MMHG

## 2017-05-04 DIAGNOSIS — D63.1 ANEMIA IN CKD (CHRONIC KIDNEY DISEASE): ICD-10-CM

## 2017-05-04 DIAGNOSIS — D59.9 ACQUIRED HEMOLYTIC ANEMIA: Primary | ICD-10-CM

## 2017-05-04 DIAGNOSIS — K92.2 GASTROINTESTINAL HEMORRHAGE, UNSPECIFIED GASTROINTESTINAL HEMORRHAGE TYPE: ICD-10-CM

## 2017-05-04 DIAGNOSIS — N18.9 ANEMIA IN CKD (CHRONIC KIDNEY DISEASE): ICD-10-CM

## 2017-05-04 PROBLEM — D58.9 HEMOLYTIC ANEMIA: Status: ACTIVE | Noted: 2017-05-04

## 2017-05-04 LAB
ALBUMIN SERPL BCP-MCNC: 3.5 G/DL
ALP SERPL-CCNC: 63 U/L
ALT SERPL W/O P-5'-P-CCNC: 29 U/L
ANION GAP SERPL CALC-SCNC: 5 MMOL/L
AST SERPL-CCNC: 21 U/L
BASOPHILS # BLD AUTO: 0.03 K/UL
BASOPHILS # BLD AUTO: 0.03 K/UL
BASOPHILS NFR BLD: 0.5 %
BASOPHILS NFR BLD: 0.5 %
BILIRUB SERPL-MCNC: 0.6 MG/DL
BUN SERPL-MCNC: 23 MG/DL
CALCIUM SERPL-MCNC: 8.1 MG/DL
CHLORIDE SERPL-SCNC: 94 MMOL/L
CO2 SERPL-SCNC: 37 MMOL/L
CREAT SERPL-MCNC: 2.52 MG/DL
DIFFERENTIAL METHOD: ABNORMAL
DIFFERENTIAL METHOD: ABNORMAL
EOSINOPHIL # BLD AUTO: 0.5 K/UL
EOSINOPHIL # BLD AUTO: 0.5 K/UL
EOSINOPHIL NFR BLD: 7.6 %
EOSINOPHIL NFR BLD: 7.6 %
ERYTHROCYTE [DISTWIDTH] IN BLOOD BY AUTOMATED COUNT: 18.8 %
ERYTHROCYTE [DISTWIDTH] IN BLOOD BY AUTOMATED COUNT: 18.8 %
EST. GFR  (AFRICAN AMERICAN): 21 ML/MIN/1.73 M^2
EST. GFR  (NON AFRICAN AMERICAN): 18 ML/MIN/1.73 M^2
GLUCOSE SERPL-MCNC: 79 MG/DL
HCT VFR BLD AUTO: 31.9 %
HCT VFR BLD AUTO: 31.9 %
HGB BLD-MCNC: 10.1 G/DL
HGB BLD-MCNC: 10.1 G/DL
LYMPHOCYTES # BLD AUTO: 0.7 K/UL
LYMPHOCYTES # BLD AUTO: 0.7 K/UL
LYMPHOCYTES NFR BLD: 11.3 %
LYMPHOCYTES NFR BLD: 11.3 %
MCH RBC QN AUTO: 32.2 PG
MCH RBC QN AUTO: 32.2 PG
MCHC RBC AUTO-ENTMCNC: 31.7 %
MCHC RBC AUTO-ENTMCNC: 31.7 %
MCV RBC AUTO: 102 FL
MCV RBC AUTO: 102 FL
MONOCYTES # BLD AUTO: 0.6 K/UL
MONOCYTES # BLD AUTO: 0.6 K/UL
MONOCYTES NFR BLD: 9.3 %
MONOCYTES NFR BLD: 9.3 %
NEUTROPHILS # BLD AUTO: 4.6 K/UL
NEUTROPHILS # BLD AUTO: 4.6 K/UL
NEUTROPHILS NFR BLD: 71.3 %
NEUTROPHILS NFR BLD: 71.3 %
NRBC BLD-RTO: 0 /100 WBC
NRBC BLD-RTO: 0 /100 WBC
PLATELET # BLD AUTO: 230 K/UL
PLATELET # BLD AUTO: 230 K/UL
PMV BLD AUTO: 9.2 FL
PMV BLD AUTO: 9.2 FL
POTASSIUM SERPL-SCNC: 3.5 MMOL/L
PROT SERPL-MCNC: 5.9 G/DL
RBC # BLD AUTO: 3.14 M/UL
RBC # BLD AUTO: 3.14 M/UL
RETICS/RBC NFR AUTO: 3.4 %
SODIUM SERPL-SCNC: 136 MMOL/L
WBC # BLD AUTO: 6.44 K/UL
WBC # BLD AUTO: 6.44 K/UL

## 2017-05-04 PROCEDURE — 1157F ADVNC CARE PLAN IN RCRD: CPT | Mod: S$GLB,,, | Performed by: NURSE PRACTITIONER

## 2017-05-04 PROCEDURE — 1159F MED LIST DOCD IN RCRD: CPT | Mod: S$GLB,,, | Performed by: NURSE PRACTITIONER

## 2017-05-04 PROCEDURE — 1160F RVW MEDS BY RX/DR IN RCRD: CPT | Mod: S$GLB,,, | Performed by: NURSE PRACTITIONER

## 2017-05-04 PROCEDURE — 1126F AMNT PAIN NOTED NONE PRSNT: CPT | Mod: S$GLB,,, | Performed by: NURSE PRACTITIONER

## 2017-05-04 PROCEDURE — 36415 COLL VENOUS BLD VENIPUNCTURE: CPT | Mod: PN

## 2017-05-04 PROCEDURE — 80053 COMPREHEN METABOLIC PANEL: CPT

## 2017-05-04 PROCEDURE — 99999 PR PBB SHADOW E&M-EST. PATIENT-LVL IV: CPT | Mod: PBBFAC,,, | Performed by: NURSE PRACTITIONER

## 2017-05-04 PROCEDURE — 80053 COMPREHEN METABOLIC PANEL: CPT | Mod: PN

## 2017-05-04 PROCEDURE — 85025 COMPLETE CBC W/AUTO DIFF WBC: CPT | Mod: PN

## 2017-05-04 PROCEDURE — 99213 OFFICE O/P EST LOW 20 MIN: CPT | Mod: S$GLB,,, | Performed by: NURSE PRACTITIONER

## 2017-05-04 PROCEDURE — 85045 AUTOMATED RETICULOCYTE COUNT: CPT | Mod: PN

## 2017-05-04 PROCEDURE — 85045 AUTOMATED RETICULOCYTE COUNT: CPT

## 2017-05-04 PROCEDURE — 85025 COMPLETE CBC W/AUTO DIFF WBC: CPT

## 2017-05-04 PROCEDURE — 99499 UNLISTED E&M SERVICE: CPT | Mod: S$PBB,,, | Performed by: NURSE PRACTITIONER

## 2017-05-04 NOTE — PROGRESS NOTES
HISTORY OF PRESENT ILLNESS:  This is a 71-year-old white female known to Dr. Metz for a   distant history of cirrhosis/sclerosing cholangitis for which the patient underwent hepatic   transplantation.  Immunosuppressant medications are thought to have resulted in renal failure.    She has undergone renal transplant, but has been developing worsening graft failure.  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    She returns to clinic for evaluation.  She recently presented to the ED at [facility] for overt melena and   weakness (04/18/17).  Dr. Gibbons performed an EGD and a non-bleeding ulcer was found with a   visible vessel.  It was injected with epinephrine.  She reports no further episodes.  No other   pertinent findings on a 14-point review of systems.    PHYSICAL EXAMINATION:  GENERAL:  Well-developed, well nourished, elderly white female in no acute distress.  Alert & oriented x 3.  VITAL SIGNS:  Weight 109.8 pounds, stable.  Documented in EMR and reviewed.               HEENT:  Normocephalic, atraumatic.  Oral mucosa pink and moist.  Lips without   lesions.  Tongue midline.  Oropharynx clear.  Nonicteric sclerae.   NECK:  Supple, no adenopathy.  No carotid bruits, thyromegaly or thyroid nodule.  HEART:  Regular rate and rhythm without murmur, gallop or rub.                LUNGS:  Clear to auscultation bilaterally.  Normal respiratory effort.       ABDOMEN:  Soft, nontender, nondistended with positive normoactive bowel sounds,   no hepatosplenomegaly.    EXTREMITIES:  No cyanosis, clubbing or edema.  Distal pulses are intact. AV shunt to LFA  with positive thrill & bruit.      AXILLAE AND GROIN:  No palpable pathologic lymphadenopathy is appreciated.        SKIN:  Intact/turgor normal; gray.                                                  NEUROLOGIC:  Cranial nerves II-XII grossly intact.  Motor:  Good muscle bulk and   tone.  Strength/sensory 5/5  throughout.  Gait stable.    LABORATORY:    Lab Results   Component Value Date    WBC 6.44 05/04/2017    WBC 6.44 05/04/2017    HGB 10.1 (L) 05/04/2017    HGB 10.1 (L) 05/04/2017    HCT 31.9 (L) 05/04/2017    HCT 31.9 (L) 05/04/2017     (H) 05/04/2017     (H) 05/04/2017     05/04/2017     05/04/2017     CMP  Sodium   Date Value Ref Range Status   05/04/2017 136 136 - 145 mmol/L Final     Potassium   Date Value Ref Range Status   05/04/2017 3.5 3.5 - 5.1 mmol/L Final     Chloride   Date Value Ref Range Status   05/04/2017 94 (L) 95 - 110 mmol/L Final     CO2   Date Value Ref Range Status   05/04/2017 37 (H) 22 - 31 mmol/L Final     Glucose   Date Value Ref Range Status   05/04/2017 79 70 - 110 mg/dL Final     Comment:     The ADA recommends the following guidelines for fasting glucose:  Normal:       less than 100 mg/dL  Prediabetes:  100 mg/dL to 125 mg/dL  Diabetes:     126 mg/dL or higher       BUN, Bld   Date Value Ref Range Status   05/04/2017 23 (H) 7 - 18 mg/dL Final     Creatinine   Date Value Ref Range Status   05/04/2017 2.52 (H) 0.50 - 1.40 mg/dL Final     Calcium   Date Value Ref Range Status   05/04/2017 8.1 (L) 8.4 - 10.2 mg/dL Final     Total Protein   Date Value Ref Range Status   05/04/2017 5.9 (L) 6.0 - 8.4 g/dL Final     Albumin   Date Value Ref Range Status   05/04/2017 3.5 3.5 - 5.2 g/dL Final     Total Bilirubin   Date Value Ref Range Status   05/04/2017 0.6 0.2 - 1.3 mg/dL Final     Comment:     For infants and newborns, interpretation of results should be based  on gestational age, weight and in agreement with clinical  observations.  Premature Infant recommended reference ranges:  Up to 24 hours.............<8.0 mg/dL  Up to 48 hours............<12.0 mg/dL  3-5 days..................<15.0 mg/dL  6-29 days.................<15.0 mg/dL       Alkaline Phosphatase   Date Value Ref Range Status   05/04/2017 63 38 - 145 U/L Final     AST   Date Value Ref Range Status    05/04/2017 21 14 - 36 U/L Final     ALT   Date Value Ref Range Status   05/04/2017 29 10 - 44 U/L Final     Anion Gap   Date Value Ref Range Status   05/04/2017 5 (L) 8 - 16 mmol/L Final     eGFR if    Date Value Ref Range Status   05/04/2017 21 (A) >60 mL/min/1.73 m^2 Final     eGFR if non    Date Value Ref Range Status   05/04/2017 18 (A) >60 mL/min/1.73 m^2 Final     Comment:     Calculation used to obtain the estimated glomerular filtration  rate (eGFR) is the CKD-EPI equation. Since race is unknown   in our information system, the eGFR values for   -American and Non--American patients are given   for each creatinine result.         IMPRESSION:  1.  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  Status post cadaveric renal allograft.  3.  Status post cadaveric hepatic allograft.    PLAN:  1.  Hemoglobin stable, Hold Rituxan  2.  Return to clinic in four weeks with interval CBC, CMP prior to evaluation.    Assessment/plan reviewed and approved by Dr. Keen.

## 2017-05-05 ENCOUNTER — OFFICE VISIT (OUTPATIENT)
Dept: GASTROENTEROLOGY | Facility: CLINIC | Age: 72
End: 2017-05-05
Payer: MEDICARE

## 2017-05-05 VITALS
RESPIRATION RATE: 18 BRPM | DIASTOLIC BLOOD PRESSURE: 70 MMHG | WEIGHT: 112.63 LBS | HEIGHT: 66 IN | BODY MASS INDEX: 18.1 KG/M2 | SYSTOLIC BLOOD PRESSURE: 124 MMHG | HEART RATE: 64 BPM

## 2017-05-05 DIAGNOSIS — Z94.4 HISTORY OF LIVER TRANSPLANT: ICD-10-CM

## 2017-05-05 DIAGNOSIS — K25.9 GASTRIC ULCER WITHOUT HEMORRHAGE OR PERFORATION, UNSPECIFIED CHRONICITY: ICD-10-CM

## 2017-05-05 DIAGNOSIS — D64.9 ANEMIA, UNSPECIFIED TYPE: Primary | ICD-10-CM

## 2017-05-05 DIAGNOSIS — Z94.0 HISTORY OF KIDNEY TRANSPLANT: ICD-10-CM

## 2017-05-05 DIAGNOSIS — D36.9 PAPILLOMA: ICD-10-CM

## 2017-05-05 PROCEDURE — 99214 OFFICE O/P EST MOD 30 MIN: CPT | Mod: S$GLB,,, | Performed by: NURSE PRACTITIONER

## 2017-05-05 PROCEDURE — 1159F MED LIST DOCD IN RCRD: CPT | Mod: S$GLB,,, | Performed by: NURSE PRACTITIONER

## 2017-05-05 PROCEDURE — 1157F ADVNC CARE PLAN IN RCRD: CPT | Mod: S$GLB,,, | Performed by: NURSE PRACTITIONER

## 2017-05-05 PROCEDURE — 1126F AMNT PAIN NOTED NONE PRSNT: CPT | Mod: S$GLB,,, | Performed by: NURSE PRACTITIONER

## 2017-05-05 PROCEDURE — 99499 UNLISTED E&M SERVICE: CPT | Mod: S$PBB,,, | Performed by: NURSE PRACTITIONER

## 2017-05-05 PROCEDURE — 99999 PR PBB SHADOW E&M-EST. PATIENT-LVL V: CPT | Mod: PBBFAC,,, | Performed by: NURSE PRACTITIONER

## 2017-05-05 PROCEDURE — 1160F RVW MEDS BY RX/DR IN RCRD: CPT | Mod: S$GLB,,, | Performed by: NURSE PRACTITIONER

## 2017-05-05 NOTE — PATIENT INSTRUCTIONS
Anemia  Anemia is a condition that occurs when your body does not have enough healthy red blood cells (RBCs). Your RBCs are the parts of your blood that carry oxygen throughout your body. A protein called hemoglobin allows your RBCs to absorb and release oxygen. Without enough RBCs or hemoglobin, your body doesn't get enough oxygen. Symptoms of anemia may then occur.    Symptoms of anemia  Some people with anemia have no symptoms. But most people have symptoms that range from mild to severe. These can include:  · Tiredness (fatigue)  · Weakness  · Pale skin  · Shortness of breath  · Dizziness or fainting  · Rapid heartbeat  · Trouble doing normal amounts of activity  · Jaundice (yellowing of your eyes, skin, or mouth; dark urine)  Causes of anemia  Anemia can occur when your body:  · Loses too much blood  · Does not make enough RBCs  · Destroys your RBCs at a faster rate than it can replace them  · Does not make a normal amount of hemoglobin in your RBCs  These problems can occur for many reasons, including:  · A condition that you are born with (congenital or inherited). This includes sickle cell disease or thalassemia.  · Heavy bleeding for any reason, including injury, surgery, childbirth, or even heavy menstrual periods.  · Being low in certain nutrients, such as iron, folate, or vitamin B12. This may be due to poor diet. Also, a condition like celiac disease or Crohn's disease can cause poor absorption of these nutrients  · Certain chronic conditions like diabetes, arthritis, or kidney disease.  · Certain chronic infections like tuberculosis or HIV.  · Exposure to certain medications, such as those used for chemotherapy.  There are different types of anemia. Your doctor can tell you more about the type of anemia you have and what may have caused it.  Diagnosing anemia  To diagnose anemia, your doctor gives you blood tests. These can include:  · Complete blood cell count (CBC). This test measures the amounts  of the different types of blood cells.  · Blood smear. This test checks the size and shape of your blood cells. To perform the test, your doctor views a drop of your blood under a microscope. Your doctor uses a stain to make the blood cells easier to see.  · Iron studies. These tests measure the amount of iron in your blood. Your body needs iron to make hemoglobin in your RBCs.  · Vitamin B12 and folate studies. These tests check for some of the components that help give RBCs a normal size and shape.  · Reticulocyte count. This test measures the amount of new RBCs that your bone marrow makes.  · Hemoglobin electrophoresis. This test checks for problems with your hemoglobin in RBCs.  Treating anemia  Treatment for anemia is based on the type of anemia, its cause, and the severity of your symptoms. Treatments may include:  · Diet changes. This involves increasing the amount of certain nutrients in your diet, such as iron, vitamin B12, or folate. Your doctor may also prescribe nutrient supplements.  · Medications. Certain medications treat the cause of your anemia. Others help build new RBCs or relieve symptoms. If a medication is the cause of your anemia, you may need to stop or change it.  · Blood transfusions. Replacing some of your blood can increase the number of healthy RBCs in your body.  · Surgery. In some cases, your doctor can do surgery to treat the underlying cause of anemia. If you need surgery, your doctor will explain the procedure and outline the risks and benefits for you.  Long-term concerns  If you have a certain type of anemia, you can expect a full recovery after treatment. If you have other types of anemia (especially a type you're born with), you will need to manage it for life. Your doctor can tell you more.  Date Last Reviewed: 4/27/2015  © 6376-3290 [employer]. [address] [place] [address], [place] [address]. All rights reserved. This information is not intended as a substitute for  professional medical care. Always follow your healthcare professional's instructions.        Understanding Gastric Ulcers    A gastric ulcer is an open sore in the stomach lining. It is sometimes called a peptic ulcer. This is a more general term for ulcers that may be in the stomach or the upper part of the small intestine. Ulcers can cause pain. But they may also have no symptoms for a long time.  What causes gastric ulcers?  Gastric ulcers have a few common causes. To find the cause of your ulcer, your healthcare provider will give you an exam and take your health history. He or she may also order some tests. The main causes of gastric ulcers include:  · Infection with the H. pylori (Helicobacter pylori) bacteria. This damages the stomach lining. Digestive juices can then harm the digestive tract.  · Long-term use of some over-the-counter pain medicines. This reduces the bodys ability to protect the stomach from damage.  Other causes of gastric ulcers include:  · Heavy alcohol use  · Having a family history of ulcers  · In rare cases, a tumor in the digestive tract may cause an ulcer  Symptoms of a gastric ulcer  Ulcer symptoms may appear and then go away for a time. Symptoms of a gastric ulcer may include:  · Stomach pain, often a dull or burning feeling toward the top of your belly  · Feeling full or bloated  · Heartburn or acid reflux  · Upset stomach (nausea) or vomiting  · Vomiting blood  · Lack of appetite  · Weight loss  · Black stool  · Red blood in the stool  Treatment for a gastric ulcer  Treatment for gastric ulcers may depend on what is causing them. Treatment may include:  · Not using over-the-counter medicines. You will likely need to stop taking these medicines. But in some cases these medicines cant be safely stopped. Check with your healthcare provider to see what is best for you.   · Taking medicines to ease symptoms. These medicines may help to reduce the amount of acid your stomach makes.  They also may help coat your stomach lining.  · Taking antibiotics. If your ulcer was caused by H. pylori, your provider will likely prescribe antibiotics to get rid of the infection.  · Having an endoscopy. This is often done to check the stomach and diagnose the ulcer. In some cases it can also treat the ulcer. It involves passing a flexible tube through your mouth into your stomach and small intestine.  · Using a tube (catheter) to stop the bleeding. A thin tube is passed into one of your blood vessels. Special tools are used to help stop the bleeding.  · Having surgery. You often may need this if the ulcer has caused severe symptoms.  Making some lifestyle changes can reduce ulcer symptoms. It may also prevent more damage to your digestive tract. These changes include:  · Not taking over-the-counter pain medicines. Talk with your provider about using another type of pain reliever.  · Not taking aspirin unless your provider has advised it  · Limiting the amount of alcohol you drink  · Quitting smoking  Possible complications of a gastric ulcer  Gastric ulcers can have serious complications. These can include:  · Bleeding into the stomach  · A hole (perforation) in the stomach  · A blockage that interferes with food moving from your stomach to the small intestine  An ongoing infection with H. pylori may be a risk factor for stomach cancer. This is one reason it is important to get rid of this bacteria.  When to call your healthcare provider  Call your healthcare provider right away if you have any of these:  · Vomiting blood, or vomit that looks like coffee grounds  · Bloody, black, or tarry-looking stools  · Fever of 100.4°F (38°C) or higher, or as directed  · Pain that gets worse  · Symptoms that dont get better with treatment, or symptoms that get worse  · New symptoms   Date Last Reviewed: 5/1/2016  © 7597-7148 [employer]. [address] [place] [address], [place] [address]. All rights reserved. This  information is not intended as a substitute for professional medical care. Always follow your healthcare professional's instructions.

## 2017-05-05 NOTE — PROGRESS NOTES
"Subjective:       Patient ID: Dacia Hamilton is a 71 y.o. female Body mass index is 18.46 kg/(m^2).    Chief Complaint: Follow-up ([facility] anemia)    This patient is new to me.  Established patient of Dr. Gibbons.    HPI Comments: Patient reports she is feeling pretty good.  Reviewed discharge summary:   "Admit Date: 4/18/2017  Discharge Date: 04/21/2017 4:14 PM  Attending Physician: YAHIR Sharma, *   Reason for Admission:  Chief Complaint  Patient presents with   Melena      pt is liver/kidney transplant 2006. pt had black stool this afternoon. pt called transplant team at [facility] [address] and was referred to ED for evaluation. pt also is dialysis pt.  Procedures Performed: Procedure(s) (LRB):  ESOPHAGOGASTRODUODENOSCOPY (EGD) (N/A)  Hospital Course: 70 y/o WF with very complicated medical history including liver transplant ×3 and renal transplant ×1, ESRD on HD MWF, recurrent pleural effusions requiring thoracentesis, hx of L subclavian stent placement on ASA and plavix, hx of hemolytic anemia followed by Dr Metz who was admitted with GI bleeding. On the day of admission she was feeling lightheaded and suddenly had a grossly bloody BM. She immediately called the transplant coordinator at [facility] and reported these symptoms and was instructed to go to the emergency department for evaluation. On arrival in the ED she was hemodynamically stable with a blood pressure 136/58. Her H&H were 5.8 and 18.6, having previously been 9.8 and 31.7 on April 3, 2017. A GI bleed was presumed and resuscitative measures were started. She was admitted to the CCU, and was placed on protonix ggt and 3 U of PRBC were transfused. She underwent EGD were an ulcer in the antrum/prepyloric area was visualized with no active bleeding. In addition of ppi, carafate was added. GI also recommended clear liquids today. She also was seen in consultation by nephrology who did HD on the pt (had some issues with low BP) and Dr Lopez " "for hemolytic and blood loss anemia. Pt is feeling better and tolerating oral diet ok. She will transationed to protonix 40 mg BID, carafate and will need to f/u with Dr Gibbons. Of note, she had a papilloma on the roof of the mouth noted on EGD and she will be referred by Dr Gibbons to ENT. She will also need to f/u with Dr Metz and Dr Velarde. Her last H/H is much improved (11.3/34). She will be referred to [facility]. Case d/w Dr Gibbons prior discharge.  Consults: GI, nephrology and hematology/oncology"    Anemia   Presents for initial (intial to me; was in hospital recently for anemia and ulcer noted on EGD) visit. Onset time: several years. Symptoms include bruises/bleeds easily, malaise/fatigue (improving since the hospital) and weight loss (had lost weight in the hospital, but has gained some back recently and reports her appetite is great). There has been no abdominal pain, anorexia, fever, leg swelling or palpitations. Signs of blood loss that are not present include hematemesis, hematochezia, melena (had resolved in the hospital) and vaginal bleeding. Past treatments include folic acid (vitamin c, b complex, and calcium; protonix 40 mg bid, carafate 1 gram QID). Past medical history includes chronic liver disease, chronic renal disease, heart failure (sees Dr. Singh, last on 5/3/17), hypothyroidism (on synthroid) and recent illness. There is no history of cancer, recent surgery or recent trauma. (Last saw hem/onc 5/4/17) Procedure history includes colonoscopy (last in 2014) and EGD.     Review of Systems   Constitutional: Positive for appetite change (increased since hospital discharge), malaise/fatigue (improving since the hospital) and weight loss (had lost weight in the hospital, but has gained some back recently and reports her appetite is great). Negative for fever.   HENT: Negative for trouble swallowing and voice change.    Respiratory: Negative for shortness of breath.    Cardiovascular: Negative for chest " pain and palpitations.   Gastrointestinal: Positive for diarrhea (intermittent, not a new symptom, takes questran prn which helped, denies currently). Negative for abdominal distention, abdominal pain, anal bleeding, anorexia, blood in stool, constipation, hematemesis, hematochezia, melena (had resolved in the hospital), nausea and vomiting.   Genitourinary: Negative for hematuria and vaginal bleeding.        Dialysis on , , and    Hematological: Bruises/bleeds easily.       Past Medical History:   Diagnosis Date    Anemia     BK viruria 2011    Bursitis of left hip     Cataract     Chronic kidney disease     Disorder of kidney and ureter     Encounter for blood transfusion     GERD (gastroesophageal reflux disease)     takes prilosec    Hyperlipidemia     Hypertension     Hypothyroidism     Influenza 2017    Osteoarthritis     Osteoporosis     Peptic ulcer disease     Primary biliary cirrhosis , ,     3 Liver Transplants (first 2 in same mo)    Sciatica     Urinary tract infection      Past Surgical History:   Procedure Laterality Date    2 previous liver transplant  1991-     at [place]    APPENDECTOMY      AV FISTULA PLACEMENT Left 2017    CATARACT EXTRACTION BILATERAL W/ ANTERIOR VITRECTOMY       SECTION, CLASSIC      CHOLECYSTECTOMY      COLONOSCOPY  2014    Dr. Sánchez; normal findings per patient report    combined liver-kidney transplant  2006    Primary Biliary Cirrhosis    CYSTOSCOPY      EYE SURGERY      hepaticojejunostomy (pretty en Y)  10/2006    KIDNEY TRANSPLANT      25% of Both Kidneys/Third Kidney    LIVER TRANSPLANT      #3 liver transplants     TONSILLECTOMY      TUBAL LIGATION      UPPER GASTROINTESTINAL ENDOSCOPY  2017    Dr. Gibbons, repeat in 2-3 months     Family History   Problem Relation Age of Onset    Hypertension Mother     Hypertension Father     No Known Problems  Sister     Hypertension Brother     No Known Problems Brother     Kidney disease Neg Hx     Colon cancer Neg Hx     Colon polyps Neg Hx     Crohn's disease Neg Hx     Ulcerative colitis Neg Hx      Wt Readings from Last 10 Encounters:   05/05/17 51.1 kg (112 lb 10.5 oz)   05/04/17 49.8 kg (109 lb 12.6 oz)   05/03/17 49.5 kg (109 lb 2 oz)   04/19/17 49.9 kg (110 lb 0.2 oz)   04/06/17 49.8 kg (109 lb 12.6 oz)   03/23/17 50.1 kg (110 lb 7.2 oz)   03/15/17 50.5 kg (111 lb 7.1 oz)   03/05/17 48.5 kg (106 lb 14.8 oz)   02/23/17 55.9 kg (123 lb 3.8 oz)   02/21/17 57.4 kg (126 lb 8.7 oz)     Lab Results   Component Value Date    WBC 6.44 05/04/2017    WBC 6.44 05/04/2017    HGB 10.1 (L) 05/04/2017    HGB 10.1 (L) 05/04/2017    HCT 31.9 (L) 05/04/2017    HCT 31.9 (L) 05/04/2017     (H) 05/04/2017     (H) 05/04/2017     05/04/2017     05/04/2017     CMP  Sodium   Date Value Ref Range Status   05/04/2017 136 136 - 145 mmol/L Final     Potassium   Date Value Ref Range Status   05/04/2017 3.5 3.5 - 5.1 mmol/L Final     Chloride   Date Value Ref Range Status   05/04/2017 94 (L) 95 - 110 mmol/L Final     CO2   Date Value Ref Range Status   05/04/2017 37 (H) 22 - 31 mmol/L Final     Glucose   Date Value Ref Range Status   05/04/2017 79 70 - 110 mg/dL Final     Comment:     The ADA recommends the following guidelines for fasting glucose:  Normal:       less than 100 mg/dL  Prediabetes:  100 mg/dL to 125 mg/dL  Diabetes:     126 mg/dL or higher       BUN, Bld   Date Value Ref Range Status   05/04/2017 23 (H) 7 - 18 mg/dL Final     Creatinine   Date Value Ref Range Status   05/04/2017 2.52 (H) 0.50 - 1.40 mg/dL Final     Calcium   Date Value Ref Range Status   05/04/2017 8.1 (L) 8.4 - 10.2 mg/dL Final     Total Protein   Date Value Ref Range Status   05/04/2017 5.9 (L) 6.0 - 8.4 g/dL Final     Albumin   Date Value Ref Range Status   05/04/2017 3.5 3.5 - 5.2 g/dL Final     Total Bilirubin   Date Value  "Ref Range Status   05/04/2017 0.6 0.2 - 1.3 mg/dL Final     Comment:     For infants and newborns, interpretation of results should be based  on gestational age, weight and in agreement with clinical  observations.  Premature Infant recommended reference ranges:  Up to 24 hours.............<8.0 mg/dL  Up to 48 hours............<12.0 mg/dL  3-5 days..................<15.0 mg/dL  6-29 days.................<15.0 mg/dL       Alkaline Phosphatase   Date Value Ref Range Status   05/04/2017 63 38 - 145 U/L Final     AST   Date Value Ref Range Status   05/04/2017 21 14 - 36 U/L Final     ALT   Date Value Ref Range Status   05/04/2017 29 10 - 44 U/L Final     Anion Gap   Date Value Ref Range Status   05/04/2017 5 (L) 8 - 16 mmol/L Final     eGFR if    Date Value Ref Range Status   05/04/2017 21 (A) >60 mL/min/1.73 m^2 Final     eGFR if non    Date Value Ref Range Status   05/04/2017 18 (A) >60 mL/min/1.73 m^2 Final     Comment:     Calculation used to obtain the estimated glomerular filtration  rate (eGFR) is the CKD-EPI equation. Since race is unknown   in our information system, the eGFR values for   -American and Non--American patients are given   for each creatinine result.       Lab Results   Component Value Date    IRON 67 04/19/2017    TIBC 179 (L) 04/19/2017    FERRITIN 4580 (H) 04/19/2017     Reviewed prior medical records including radiology report of 3/24/16 CT abdomen pelvis & endoscopy history (see surgical history).    4/19/2017 EGD was reviewed and procedure report states:   " Findings:       A small papilloma was seen on the roof of the mouth.       The oropharynx was otherwise normal.       The examined esophagus was normal. No varices.       The Z-line was regular and was found 40 cm from the incisors.       The cardia, gastric fundus and gastric body were normal.       One non-bleeding cratered gastric ulcer with a visible vessel was        found on the lesser " "curvature of the gastric antrum. The lesion was        4 mm in largest dimension. Area was successfully injected with 6 mL        of a 1:10,000 solution of epinephrine for hemostasis.       Minimal inflammation characterized by erythema was found on the        lesser curvature of the stomach. Biopsies were taken with a cold        forceps for Helicobacter pylori testing using CLOtest.       The examined duodenum was normal.  Impression:           - Small papilloma roof of the mouth.                        - Normal oropharynx.                        - Normal esophagus.                        - Z-line regular, 40 cm from the incisors.                        - Normal cardia, gastric fundus and gastric body.                        - Non-bleeding gastric ulcer with a visible vessel.                         Injected with epinephrine.                        - Antritis. Biopsied.                        - Normal examined duodenum.  Recommendation:       - Return patient to ICU for ongoing care.                        - Await CLOtest results.                        - Continue present medications.                        - Give Protonix (pantoprazole): 8 mg/hr IV by                         continuous infusion.                        - Use sucralfate tablets 2 gram PO QID.                        - Repeat upper endoscopy in 2-3 months to check                         healing.                        - Refer to ENT to assess the papilloma ".  Biopsy results:   Atiya test negative  Objective:      Physical Exam   Constitutional: She is oriented to person, place, and time. She appears well-developed and well-nourished. No distress.   Eyes: Conjunctivae are normal. Pupils are equal, round, and reactive to light. No scleral icterus.   Neck: Neck supple. No tracheal deviation present.   Cardiovascular: Normal rate.    Pulmonary/Chest: Effort normal and breath sounds normal. No respiratory distress. She has no wheezes.   Abdominal: Soft. " Normal appearance and bowel sounds are normal. She exhibits no distension, no abdominal bruit and no mass. There is no hepatosplenomegaly. There is no tenderness. There is no rigidity, no rebound, no guarding, no tenderness at McBurney's point and negative Valdes's sign. No hernia.   Well-healed surgical scars noted.   Lymphadenopathy:     She has no cervical adenopathy.   Neurological: She is alert and oriented to person, place, and time.   Skin: Skin is warm and dry. No rash noted. She is not diaphoretic. No erythema. No pallor.   Non-jaundiced   Psychiatric: She has a normal mood and affect. Her behavior is normal.   Nursing note and vitals reviewed.      Assessment:       1. Anemia, unspecified type    2. Gastric ulcer without hemorrhage or perforation, unspecified chronicity    3. Papilloma    4. History of liver transplant    5. History of kidney transplant        Plan:       Anemia, unspecified type  - discussed with patient the different ways that anemia occurs: blood loss (such as from the gi tract), the body is not making enough, or the body is breaking down the rbcs too quickly; recommend repeat EGD as discussed to reassess gastric ulcer for healing and pending results of endoscopies and if anemia persist, and possible colonoscopy/UGI with Small Bowel Follow Through/video capsule study  -follow-up with PCP and hematology for continued evaluation and management  - schedule EGD to be done on/after 6/19/17, discussed procedure with patient, verbalized understanding    Gastric ulcer without hemorrhage or perforation, unspecified chronicity  - schedule EGD to be done on/after 6/19/17, discussed procedure with patient, verbalized understanding  - continue protonix 40 mg bid as directed, take before breakfast & dinner, discussed about possible long term use of medication (prefer to use lowest effective dose or discontinuing if possible) and discussed the risks & benefits with taking a reflux medication long  term, and to take OTC calcium and vitamin d supplements as directed (such as Citracal +D), pt verbalized understanding  - continue carafate 1 gram QID as directed  -Educated patient on lifestyle modifications to help control/reduce reflux/abdominal pain including: avoid large meals, avoid eating within 2-3 hours of bedtime (avoid late night eating & lying down soon after eating), elevate head of bed if nocturnal symptoms are present, smoking cessation (if current smoker), & weight loss (if overweight).   -Educated to avoid known foods which trigger reflux symptoms & to minimize/avoid high-fat foods, chocolate, caffeine, citrus, alcohol, & tomato products.  -Advised to avoid use of NSAID's, since they can cause GI upset, bleeding, and/or ulcers.    Papilloma  - continue with ENT appointment as scheduled on 5/18/17    History of liver transplant & History of kidney transplant  - follow-up with transplant team for continued evaluation and management    Return in about 2 months (around 7/5/2017), or if symptoms worsen or fail to improve.    If no improvement in symptoms or symptoms worsen, call/follow-up at clinic or go to ER.

## 2017-05-05 NOTE — MR AVS SNAPSHOT
[facility] Gastroenterology  [address] [facility] [address]  [place] [address]  Phone: [phone]                  Dacia Hamilton   2017 8:00 AM   Office Visit    Description:  Female : 1945   Provider:  MASOUD Lira   Department:  [facility]           Reason for Visit     Follow-up           Diagnoses this Visit        Comments    Anemia, unspecified type    -  Primary     Gastric ulcer without hemorrhage or perforation, unspecified chronicity         Papilloma         History of liver transplant         History of kidney transplant                To Do List           Future Appointments        Provider Department Dept Phone    2017 2:20 PM Agnes Ulloa NP [facility] - [phone]    2017 10:00 AM LAB, [facility] Laboratory [phone]    2017 11:00 AM Kaylan Metz MD [facility] Hematology [phone]    2017 11:30 AM CHAIR 34, [facility] CHEMO [facility] [phone]    2017 8:30 AM LAB, [place] [facility] [phone]      Goals (5 Years of Data)     None      Follow-Up and Disposition     Return in about 2 months (around 2017), or if symptoms worsen or fail to improve.      [facility]     [facility] Nurse Care Line - 24/ Assistance  Unless otherwise directed by your provider, please contact [facility], our nurse care line that is available for  assistance.     Registered nurses in the [facility] provide: appointment scheduling, clinical advisement, health education, and other advisory services.  Call: [phone] (toll free)               Medications           Message regarding Medications     Verify the changes and/or additions to your medication regime listed below are the same as discussed with your clinician today.  If any of these changes or additions are incorrect, please notify your healthcare provider.             Verify that  the below list of medications is an accurate representation of the medications you are currently taking.  If none reported, the list may be blank. If incorrect, please contact your healthcare provider. Carry this list with you in case of emergency.           Current Medications     alprazolam (XANAX) 0.25 MG tablet One po prn anxiety not to exceed one per day    B complex-vitamin C-folic acid (FOLBEE PLUS) 5 mg Tab Take 1 tablet by mouth every evening.    benzonatate (TESSALON) 100 MG capsule Take 100 mg by mouth 3 (three) times daily as needed for Cough.    calcium carbonate (OS-ALBINA) 600 mg (1,500 mg) Tab Take 600 mg by mouth once daily.    cholestyramine (QUESTRAN) 4 gram packet Take 4 g by mouth daily as needed.    cloNIDine 0.1 mg/24 hr td ptwk (CATAPRES) 0.1 mg/24 hr Place 1 patch onto the skin every 7 days. Every Wednesday    clopidogrel (PLAVIX) 75 mg tablet Take 75 mg by mouth once daily.    fish oil-omega-3 fatty acids 300-1,000 mg capsule Take 2 g by mouth once daily.      folic acid (FOLVITE) 400 MCG tablet Take 800 mcg by mouth once daily.    furosemide (LASIX) 80 MG tablet Take 1 tablet (80 mg total) by mouth once daily.    levothyroxine (SYNTHROID) 75 MCG tablet Take 1 tablet (75 mcg total) by mouth once daily.    lidocaine-prilocaine (EMLA) cream APPLY TO AFFECTED AREA (FISTULA) 1 HOUR PRIOR TO HD    losartan (COZAAR) 50 MG tablet One po at noon    magnesium oxide (MAG-OX) 400 mg tablet Take 1 tablet (400 mg total) by mouth 3 (three) times daily.    metoprolol tartrate (LOPRESSOR) 50 MG tablet Take 1 tablet (50 mg total) by mouth 2 (two) times daily.    multivitamin capsule Take 1 capsule by mouth once daily. Every morning    pantoprazole (PROTONIX) 40 MG tablet Take 1 tablet (40 mg total) by mouth 2 (two) times daily.    phenyleph-min oil-petrolatum (PREPARATION H) 0.25-14-74.9 % Oint Place 1 applicator rectally 4 (four) times daily as needed.    promethazine (PHENERGAN) 12.5 MG Tab Take 1 tablet  "(12.5 mg total) by mouth every 6 (six) hours as needed (nausea). May make you sleep    PROPYLENE GLYCOL/ (SYSTANE, PROPYLENE GLYCOL, OPHT) Apply 1 drop to eye once daily. 1 drop every day both eyes    RITUXIMAB (RITUXAN IV) Inject into the vein once a week. Administered at [facility]    sucralfate (CARAFATE) 1 gram tablet Take 1 tablet (1 g total) by mouth 4 (four) times daily before meals and nightly.    tacrolimus (PROGRAF) 1 MG Cap Take 1 capsule (1 mg total) by mouth every 12 (twelve) hours.    zolpidem (AMBIEN) 5 MG Tab Take 1 tablet (5 mg total) by mouth nightly as needed (insomnia). At bedtime           Clinical Reference Information           Your Vitals Were     BP Pulse Resp Height Weight Last Period    124/70 64 18 5' 5.5" (1.664 m) 51.1 kg (112 lb 10.5 oz) (LMP Unknown)    BMI                18.46 kg/m2          Blood Pressure          Most Recent Value    BP  124/70      Allergies as of 5/5/2017     Flonase [Fluticasone]    Banana      Immunizations Administered on Date of Encounter - 5/5/2017     None      Maintenance Dialysis History     Start End Type Comments Center    1/8/1888  [place]  [facility]            Current Dialysis Center Information     [facility] [address] Phone #:  [phone]    Contact:  N/A ARNAV SMITH  [address] Fax #:  [phone]            Transplant Information        Txp Date Organ Coordinator Care Team    6/4/2006 Liver Lorin Mejia RN Surgeon:  Brendan Amor MD   Assisting Surgeon:  Victor Hugo Mcintosh Jr., MD   Referring Physician:  Syeda Tong MD   Current Hepatologist:  Victor Hugo Wise MD   Corresponding Physician:  [facility] MD   Current PCP:  David [facility] MD          Txp Date Organ Coordinator Care Team    6/4/2006 Kidney Lorin Mejia RN Surgeon:  Brendan Amor MD   Referring Physician:  Syeda Tong MD         Instructions      Anemia  Anemia is a condition that occurs when your body does " not have enough healthy red blood cells (RBCs). Your RBCs are the parts of your blood that carry oxygen throughout your body. A protein called hemoglobin allows your RBCs to absorb and release oxygen. Without enough RBCs or hemoglobin, your body doesn't get enough oxygen. Symptoms of anemia may then occur.    Symptoms of anemia  Some people with anemia have no symptoms. But most people have symptoms that range from mild to severe. These can include:  · Tiredness (fatigue)  · Weakness  · Pale skin  · Shortness of breath  · Dizziness or fainting  · Rapid heartbeat  · Trouble doing normal amounts of activity  · Jaundice (yellowing of your eyes, skin, or mouth; dark urine)  Causes of anemia  Anemia can occur when your body:  · Loses too much blood  · Does not make enough RBCs  · Destroys your RBCs at a faster rate than it can replace them  · Does not make a normal amount of hemoglobin in your RBCs  These problems can occur for many reasons, including:  · A condition that you are born with (congenital or inherited). This includes sickle cell disease or thalassemia.  · Heavy bleeding for any reason, including injury, surgery, childbirth, or even heavy menstrual periods.  · Being low in certain nutrients, such as iron, folate, or vitamin B12. This may be due to poor diet. Also, a condition like celiac disease or Crohn's disease can cause poor absorption of these nutrients  · Certain chronic conditions like diabetes, arthritis, or kidney disease.  · Certain chronic infections like tuberculosis or HIV.  · Exposure to certain medications, such as those used for chemotherapy.  There are different types of anemia. Your doctor can tell you more about the type of anemia you have and what may have caused it.  Diagnosing anemia  To diagnose anemia, your doctor gives you blood tests. These can include:  · Complete blood cell count (CBC). This test measures the amounts of the different types of blood cells.  · Blood smear. This test  checks the size and shape of your blood cells. To perform the test, your doctor views a drop of your blood under a microscope. Your doctor uses a stain to make the blood cells easier to see.  · Iron studies. These tests measure the amount of iron in your blood. Your body needs iron to make hemoglobin in your RBCs.  · Vitamin B12 and folate studies. These tests check for some of the components that help give RBCs a normal size and shape.  · Reticulocyte count. This test measures the amount of new RBCs that your bone marrow makes.  · Hemoglobin electrophoresis. This test checks for problems with your hemoglobin in RBCs.  Treating anemia  Treatment for anemia is based on the type of anemia, its cause, and the severity of your symptoms. Treatments may include:  · Diet changes. This involves increasing the amount of certain nutrients in your diet, such as iron, vitamin B12, or folate. Your doctor may also prescribe nutrient supplements.  · Medications. Certain medications treat the cause of your anemia. Others help build new RBCs or relieve symptoms. If a medication is the cause of your anemia, you may need to stop or change it.  · Blood transfusions. Replacing some of your blood can increase the number of healthy RBCs in your body.  · Surgery. In some cases, your doctor can do surgery to treat the underlying cause of anemia. If you need surgery, your doctor will explain the procedure and outline the risks and benefits for you.  Long-term concerns  If you have a certain type of anemia, you can expect a full recovery after treatment. If you have other types of anemia (especially a type you're born with), you will need to manage it for life. Your doctor can tell you more.  Date Last Reviewed: 4/27/2015 © 2000-2016 [employer]. [address] [place] [address], [place] [address]. All rights reserved. This information is not intended as a substitute for professional medical care. Always follow your healthcare  professional's instructions.        Understanding Gastric Ulcers    A gastric ulcer is an open sore in the stomach lining. It is sometimes called a peptic ulcer. This is a more general term for ulcers that may be in the stomach or the upper part of the small intestine. Ulcers can cause pain. But they may also have no symptoms for a long time.  What causes gastric ulcers?  Gastric ulcers have a few common causes. To find the cause of your ulcer, your healthcare provider will give you an exam and take your health history. He or she may also order some tests. The main causes of gastric ulcers include:  · Infection with the H. pylori (Helicobacter pylori) bacteria. This damages the stomach lining. Digestive juices can then harm the digestive tract.  · Long-term use of some over-the-counter pain medicines. This reduces the bodys ability to protect the stomach from damage.  Other causes of gastric ulcers include:  · Heavy alcohol use  · Having a family history of ulcers  · In rare cases, a tumor in the digestive tract may cause an ulcer  Symptoms of a gastric ulcer  Ulcer symptoms may appear and then go away for a time. Symptoms of a gastric ulcer may include:  · Stomach pain, often a dull or burning feeling toward the top of your belly  · Feeling full or bloated  · Heartburn or acid reflux  · Upset stomach (nausea) or vomiting  · Vomiting blood  · Lack of appetite  · Weight loss  · Black stool  · Red blood in the stool  Treatment for a gastric ulcer  Treatment for gastric ulcers may depend on what is causing them. Treatment may include:  · Not using over-the-counter medicines. You will likely need to stop taking these medicines. But in some cases these medicines cant be safely stopped. Check with your healthcare provider to see what is best for you.   · Taking medicines to ease symptoms. These medicines may help to reduce the amount of acid your stomach makes. They also may help coat your stomach lining.  · Taking  antibiotics. If your ulcer was caused by H. pylori, your provider will likely prescribe antibiotics to get rid of the infection.  · Having an endoscopy. This is often done to check the stomach and diagnose the ulcer. In some cases it can also treat the ulcer. It involves passing a flexible tube through your mouth into your stomach and small intestine.  · Using a tube (catheter) to stop the bleeding. A thin tube is passed into one of your blood vessels. Special tools are used to help stop the bleeding.  · Having surgery. You often may need this if the ulcer has caused severe symptoms.  Making some lifestyle changes can reduce ulcer symptoms. It may also prevent more damage to your digestive tract. These changes include:  · Not taking over-the-counter pain medicines. Talk with your provider about using another type of pain reliever.  · Not taking aspirin unless your provider has advised it  · Limiting the amount of alcohol you drink  · Quitting smoking  Possible complications of a gastric ulcer  Gastric ulcers can have serious complications. These can include:  · Bleeding into the stomach  · A hole (perforation) in the stomach  · A blockage that interferes with food moving from your stomach to the small intestine  An ongoing infection with H. pylori may be a risk factor for stomach cancer. This is one reason it is important to get rid of this bacteria.  When to call your healthcare provider  Call your healthcare provider right away if you have any of these:  · Vomiting blood, or vomit that looks like coffee grounds  · Bloody, black, or tarry-looking stools  · Fever of 100.4°F (38°C) or higher, or as directed  · Pain that gets worse  · Symptoms that dont get better with treatment, or symptoms that get worse  · New symptoms   Date Last Reviewed: 5/1/2016 © 2000-2016 [employer]. [address] [place] [address], [place] [address]. All rights reserved. This information is not intended as a substitute for  professional medical care. Always follow your healthcare professional's instructions.             Language Assistance Services     ATTENTION: Language assistance services are available, free of charge. Please call [phone].      ATENCIÓN: Si habla danilo, tiene a barnett disposición servicios gratuitos de asistencia lingüística. Llame al [phone].     CHÚ Ý: N?u b?n nói Ti?ng Vi?t, có các d?ch v? h? tr? ngôn ng? mi?n phí dành cho b?n. G?i s? [phone].         [facility] complies with applicable Federal civil rights laws and does not discriminate on the basis of race, color, national origin, age, disability, or sex.

## 2017-05-08 ENCOUNTER — TELEPHONE (OUTPATIENT)
Dept: GASTROENTEROLOGY | Facility: CLINIC | Age: 72
End: 2017-05-08

## 2017-05-12 ENCOUNTER — TELEPHONE (OUTPATIENT)
Dept: GASTROENTEROLOGY | Facility: CLINIC | Age: 72
End: 2017-05-12

## 2017-05-12 NOTE — TELEPHONE ENCOUNTER
Spoke to pt   Canceled appt with Agnes Ulloa, NP per her nurse  Would be best for pt to see either Dr. Florez or Dr Wyatt.

## 2017-05-16 ENCOUNTER — PATIENT MESSAGE (OUTPATIENT)
Dept: NEPHROLOGY | Facility: CLINIC | Age: 72
End: 2017-05-16

## 2017-05-16 RX ORDER — METOPROLOL TARTRATE 50 MG/1
50 TABLET ORAL 2 TIMES DAILY
Qty: 180 TABLET | Refills: 3 | Status: SHIPPED | OUTPATIENT
Start: 2017-05-16 | End: 2017-08-10 | Stop reason: ALTCHOICE

## 2017-05-25 ENCOUNTER — OFFICE VISIT (OUTPATIENT)
Dept: OTOLARYNGOLOGY | Facility: CLINIC | Age: 72
End: 2017-05-25
Payer: MEDICARE

## 2017-05-25 VITALS
BODY MASS INDEX: 17.27 KG/M2 | WEIGHT: 105.38 LBS | DIASTOLIC BLOOD PRESSURE: 79 MMHG | HEART RATE: 68 BPM | SYSTOLIC BLOOD PRESSURE: 174 MMHG

## 2017-05-25 DIAGNOSIS — D36.9 PAPILLOMA: Primary | ICD-10-CM

## 2017-05-25 PROCEDURE — 1157F ADVNC CARE PLAN IN RCRD: CPT | Mod: S$GLB,,, | Performed by: OTOLARYNGOLOGY

## 2017-05-25 PROCEDURE — 88305 TISSUE EXAM BY PATHOLOGIST: CPT | Performed by: PATHOLOGY

## 2017-05-25 PROCEDURE — 1159F MED LIST DOCD IN RCRD: CPT | Mod: S$GLB,,, | Performed by: OTOLARYNGOLOGY

## 2017-05-25 PROCEDURE — 1125F AMNT PAIN NOTED PAIN PRSNT: CPT | Mod: S$GLB,,, | Performed by: OTOLARYNGOLOGY

## 2017-05-25 PROCEDURE — 88305 TISSUE EXAM BY PATHOLOGIST: CPT | Mod: 26,,, | Performed by: PATHOLOGY

## 2017-05-25 PROCEDURE — 40810 EXCISION OF MOUTH LESION: CPT | Mod: S$GLB,,, | Performed by: OTOLARYNGOLOGY

## 2017-05-25 PROCEDURE — 99999 PR PBB SHADOW E&M-EST. PATIENT-LVL IV: CPT | Mod: PBBFAC,,, | Performed by: OTOLARYNGOLOGY

## 2017-05-25 PROCEDURE — 99203 OFFICE O/P NEW LOW 30 MIN: CPT | Mod: 25,S$GLB,, | Performed by: OTOLARYNGOLOGY

## 2017-05-25 NOTE — PROGRESS NOTES
Chief Complaint   Patient presents with    papilloma on roof of mouth       HPI   71 y.o. female presents from Dr. Gibbons for evaluation of a papilloma of the L soft palate.  This was incidentally noted on EGD.  No complaints.     Review of Systems   Constitutional: Negative for fatigue and unexpected weight change.   HENT: Per HPI.  Eyes: Negative for visual disturbance.   Respiratory: Negative for shortness of breath, hemoptysis   Cardiovascular: Negative for chest pain and palpitations.   Musculoskeletal: Negative for decreased ROM, back pain.   Skin: Negative for rash, sunburn, itching.   Neurological: Negative for dizziness and seizures.   Hematological: Negative for adenopathy. Does not bruise/bleed easily.   Endocrine: Negative for rapid weight loss/weight gain, heat/cold intolerance.     Past Medical History   Patient Active Problem List   Diagnosis    PBC (primary biliary cirrhosis)    S/P kidney transplant    Liver transplanted    Immunosuppressed status    Anemia    Neutropenia, drug-induced    Secondary renal hyperparathyroidism    Secondary hypertension    CKD (chronic kidney disease) stage 5, GFR less than 15 ml/min    Enthesopathy of hip region    Sciatica    Proteinuria    Acute renal failure with pathological lesion in kidney    Acute rejection of kidney transplant 2/2016    Anemia of chronic disorder    Prophylactic immunotherapy    Hypokalemia    Iron deficiency anemia    Hypomagnesemia    Absolute anemia    Kidney transplant candidate    Severe anemia    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    ESRD (end stage renal disease)    Non-rheumatic mitral regurgitation    Bilateral pleural effusion    Dyspnea on exertion    Chronic cough    Chronic diastolic congestive heart failure    End stage renal disease    Acute on chronic diastolic heart failure    Elevated troponin    Hypervolemia    Malnutrition    Pericardial effusion    Gastrointestinal hemorrhage with  melena    Anemia due to acute blood loss    Antritis (stomach)    Hemolytic anemia           Past Surgical History   Past Surgical History:   Procedure Laterality Date    2 previous liver transplant  1991-     at [place]    APPENDECTOMY      AV FISTULA PLACEMENT Left 2017    CATARACT EXTRACTION BILATERAL W/ ANTERIOR VITRECTOMY       SECTION, CLASSIC  1968    CHOLECYSTECTOMY      COLONOSCOPY  2014    Dr. Sánchez; normal findings per patient report    combined liver-kidney transplant  2006    Primary Biliary Cirrhosis    CYSTOSCOPY      EYE SURGERY      hepaticojejunostomy (pretty en Y)  10/2006    KIDNEY TRANSPLANT      25% of Both Kidneys/Third Kidney    LIVER TRANSPLANT      #3 liver transplants     TONSILLECTOMY      TUBAL LIGATION  1968    UPPER GASTROINTESTINAL ENDOSCOPY  2017    Dr. Gibbons, repeat in 2-3 months         Family History   Family History   Problem Relation Age of Onset    Hypertension Mother     Hypertension Father     No Known Problems Sister     Hypertension Brother     No Known Problems Brother     Kidney disease Neg Hx     Colon cancer Neg Hx     Colon polyps Neg Hx     Crohn's disease Neg Hx     Ulcerative colitis Neg Hx            Social History   .  Social History     Social History    Marital status:      Spouse name: N/A    Number of children: N/A    Years of education: N/A     Occupational History    Not on file.     Social History Main Topics    Smoking status: Never Smoker    Smokeless tobacco: Never Used    Alcohol use No    Drug use: No    Sexual activity: Not on file     Other Topics Concern    Not on file     Social History Narrative    No narrative on file         Allergies   Review of patient's allergies indicates:   Allergen Reactions    Flonase [fluticasone]      Local blisters    Banana Nausea Only           Physical Exam     Vitals:    17 1416   BP: (!) 174/79   Pulse: 68         Body  mass index is 17.27 kg/m².      General: AOx3, NAD   Respiratory:  Symmetric chest rise, normal effort  Right Ear: External Auditory Canal WNL,TM w/o masses/lesions/perforations.  Middle ear without evidence of effusion.   Left Ear: External Auditory Canal WNL,TM w/o masses/lesions/perforations.  Middle ear without evidence of effusion.   Nose: No gross nasal septal deviation. Inferior Turbinates WNL bilaterally. No septal perforation. No masses/lesions.   Oral Cavity:  Oral Tongue mobile, no lesions noted. Hard Palate WNL. No buccal or FOM lesions.  Oropharynx:  No masses/lesions of the posterior pharyngeal wall. Tonsillar fossa without lesions. Soft palate without masses.  Sessile papilloma L soft palate- biopsy obtained with biopsy forceps.  Midline uvula.   Neck: No scars.  No cervical lymphadenopathy, thyromegaly or thyroid nodules.  Normal range of motion.    Face: House Brackmann I bilaterally.     Assessment   1. Papilloma        Plan   71 y.o. female with a papilloma of the L soft palate.  Biopsy obtained today.  I will contact her with bx results.

## 2017-05-25 NOTE — LETTER
May 25, 2017      Wisam Gibbons Jr., MD  [address] [facility] [address]  [facility] [address]           [facility]  [address]  [facility] [address]  Phone: [phone]  Fax: [phone]          Patient: Dacia Hamilton   MR Number: [id number]   YOB: 1945   Date of Visit: 5/25/2017       Dear Dr. Wisam Gibbons Jr.:    Thank you for referring Dacia Hamilton to me for evaluation. Attached you will find relevant portions of my assessment and plan of care.    If you have questions, please do not hesitate to call me. I look forward to following Dacia Hamilton along with you.    Sincerely,    Nikita Wyatt MD    Enclosure  CC:  No Recipients    If you would like to receive this communication electronically, please contact [email or web address] or [phone] to request more information on [employer] access.    For providers and/or their staff who would like to refer a patient to [facility], please contact us through our one-stop-shop provider referral line, [facility], at [phone].    If you feel you have received this communication in error or would no longer like to receive these types of communications, please e-mail [email or web address]

## 2017-05-31 ENCOUNTER — TELEPHONE (OUTPATIENT)
Dept: HEMATOLOGY/ONCOLOGY | Facility: CLINIC | Age: 72
End: 2017-05-31

## 2017-05-31 NOTE — TELEPHONE ENCOUNTER
Call from [facility] to let us know pt's rituxan auth is pending and not auth and has an appt with Dr Metz tomorrow. Will review with Dr Metz in am. cm

## 2017-06-01 ENCOUNTER — OFFICE VISIT (OUTPATIENT)
Dept: HEMATOLOGY/ONCOLOGY | Facility: CLINIC | Age: 72
End: 2017-06-01
Payer: MEDICARE

## 2017-06-01 ENCOUNTER — TELEPHONE (OUTPATIENT)
Dept: INFUSION THERAPY | Facility: HOSPITAL | Age: 72
End: 2017-06-01

## 2017-06-01 ENCOUNTER — PATIENT MESSAGE (OUTPATIENT)
Dept: OTOLARYNGOLOGY | Facility: CLINIC | Age: 72
End: 2017-06-01

## 2017-06-01 ENCOUNTER — LAB VISIT (OUTPATIENT)
Dept: LAB | Facility: HOSPITAL | Age: 72
End: 2017-06-01
Attending: INTERNAL MEDICINE
Payer: MEDICARE

## 2017-06-01 VITALS
TEMPERATURE: 97 F | DIASTOLIC BLOOD PRESSURE: 61 MMHG | WEIGHT: 104.06 LBS | SYSTOLIC BLOOD PRESSURE: 149 MMHG | HEART RATE: 61 BPM | RESPIRATION RATE: 18 BRPM | HEIGHT: 66 IN | BODY MASS INDEX: 16.72 KG/M2

## 2017-06-01 DIAGNOSIS — T86.11 ACUTE REJECTION OF KIDNEY TRANSPLANT: ICD-10-CM

## 2017-06-01 DIAGNOSIS — J90 BILATERAL PLEURAL EFFUSION: ICD-10-CM

## 2017-06-01 DIAGNOSIS — K92.1 GASTROINTESTINAL HEMORRHAGE WITH MELENA: ICD-10-CM

## 2017-06-01 DIAGNOSIS — N18.5 CKD (CHRONIC KIDNEY DISEASE) STAGE 5, GFR LESS THAN 15 ML/MIN: ICD-10-CM

## 2017-06-01 DIAGNOSIS — D59.9 ACQUIRED HEMOLYTIC ANEMIA: ICD-10-CM

## 2017-06-01 DIAGNOSIS — D58.9 HEREDITARY HEMOLYTIC ANEMIA: Primary | ICD-10-CM

## 2017-06-01 DIAGNOSIS — Z94.4 LIVER TRANSPLANTED: ICD-10-CM

## 2017-06-01 LAB
ALBUMIN SERPL BCP-MCNC: 3.5 G/DL
ALP SERPL-CCNC: 88 U/L
ALT SERPL W/O P-5'-P-CCNC: 29 U/L
ANION GAP SERPL CALC-SCNC: 7 MMOL/L
AST SERPL-CCNC: 25 U/L
BASOPHILS # BLD AUTO: 0.03 K/UL
BASOPHILS NFR BLD: 0.8 %
BILIRUB SERPL-MCNC: 0.6 MG/DL
BUN SERPL-MCNC: 27 MG/DL
CALCIUM SERPL-MCNC: 8.4 MG/DL
CHLORIDE SERPL-SCNC: 94 MMOL/L
CO2 SERPL-SCNC: 37 MMOL/L
CREAT SERPL-MCNC: 2.95 MG/DL
DIFFERENTIAL METHOD: ABNORMAL
EOSINOPHIL # BLD AUTO: 0.5 K/UL
EOSINOPHIL NFR BLD: 14.1 %
ERYTHROCYTE [DISTWIDTH] IN BLOOD BY AUTOMATED COUNT: 17.2 %
EST. GFR  (AFRICAN AMERICAN): 17 ML/MIN/1.73 M^2
EST. GFR  (NON AFRICAN AMERICAN): 15 ML/MIN/1.73 M^2
GLUCOSE SERPL-MCNC: 80 MG/DL
HCT VFR BLD AUTO: 31.8 %
HGB BLD-MCNC: 9.8 G/DL
LYMPHOCYTES # BLD AUTO: 0.8 K/UL
LYMPHOCYTES NFR BLD: 22 %
MCH RBC QN AUTO: 32.1 PG
MCHC RBC AUTO-ENTMCNC: 30.8 %
MCV RBC AUTO: 104 FL
MONOCYTES # BLD AUTO: 0.6 K/UL
MONOCYTES NFR BLD: 15.5 %
NEUTROPHILS # BLD AUTO: 1.7 K/UL
NEUTROPHILS NFR BLD: 47.6 %
NRBC BLD-RTO: 0 /100 WBC
PLATELET # BLD AUTO: 237 K/UL
PMV BLD AUTO: 9.1 FL
POTASSIUM SERPL-SCNC: 4.1 MMOL/L
PROT SERPL-MCNC: 5.9 G/DL
RBC # BLD AUTO: 3.05 M/UL
SODIUM SERPL-SCNC: 138 MMOL/L
WBC # BLD AUTO: 3.54 K/UL

## 2017-06-01 PROCEDURE — 99999 PR PBB SHADOW E&M-EST. PATIENT-LVL III: CPT | Mod: PBBFAC,,, | Performed by: INTERNAL MEDICINE

## 2017-06-01 PROCEDURE — 1159F MED LIST DOCD IN RCRD: CPT | Mod: S$GLB,,, | Performed by: INTERNAL MEDICINE

## 2017-06-01 PROCEDURE — 36415 COLL VENOUS BLD VENIPUNCTURE: CPT | Mod: PN

## 2017-06-01 PROCEDURE — 80053 COMPREHEN METABOLIC PANEL: CPT

## 2017-06-01 PROCEDURE — 1126F AMNT PAIN NOTED NONE PRSNT: CPT | Mod: S$GLB,,, | Performed by: INTERNAL MEDICINE

## 2017-06-01 PROCEDURE — 1157F ADVNC CARE PLAN IN RCRD: CPT | Mod: S$GLB,,, | Performed by: INTERNAL MEDICINE

## 2017-06-01 PROCEDURE — 85025 COMPLETE CBC W/AUTO DIFF WBC: CPT | Mod: PN

## 2017-06-01 PROCEDURE — 80053 COMPREHEN METABOLIC PANEL: CPT | Mod: PN

## 2017-06-01 PROCEDURE — 99215 OFFICE O/P EST HI 40 MIN: CPT | Mod: S$GLB,,, | Performed by: INTERNAL MEDICINE

## 2017-06-01 PROCEDURE — 99499 UNLISTED E&M SERVICE: CPT | Mod: S$PBB,,, | Performed by: INTERNAL MEDICINE

## 2017-06-01 PROCEDURE — 85025 COMPLETE CBC W/AUTO DIFF WBC: CPT

## 2017-06-01 RX ORDER — FAMOTIDINE 20 MG/50ML
20 INJECTION, SOLUTION INTRAVENOUS
Status: CANCELLED | OUTPATIENT
Start: 2017-08-04 | End: 2017-07-13

## 2017-06-01 RX ORDER — ACETAMINOPHEN 325 MG/1
650 TABLET ORAL
Status: CANCELLED | OUTPATIENT
Start: 2017-08-04

## 2017-06-01 RX ORDER — MEPERIDINE HYDROCHLORIDE 25 MG/ML
25 INJECTION INTRAMUSCULAR; INTRAVENOUS; SUBCUTANEOUS
Status: CANCELLED | OUTPATIENT
Start: 2017-08-04 | End: 2017-07-13

## 2017-06-01 RX ORDER — SODIUM CHLORIDE 0.9 % (FLUSH) 0.9 %
10 SYRINGE (ML) INJECTION
Status: CANCELLED | OUTPATIENT
Start: 2017-08-04

## 2017-06-01 RX ORDER — HEPARIN 100 UNIT/ML
500 SYRINGE INTRAVENOUS
Status: CANCELLED | OUTPATIENT
Start: 2017-08-04

## 2017-06-01 NOTE — TELEPHONE ENCOUNTER
Pt has no authorization for treatment today.  Per Dr. Metz ok to push back a week. Rescheduled pt on 6/8/17 at 9:30am. TRENA Jacome LPN made pt aware of appt date and time.

## 2017-06-01 NOTE — PROGRESS NOTES
Subjective:       Patient ID: Dacia Hamilton is a 71 y.o. female.    Chief Complaint: rituxan  HPI:   oN Rituxan maintenance for hemolytic anemia. wentr to ED dx with the Flu and on Tamiflu since last week.   Receiving dialysis , taken off myfortil for renal immunosuppression to help alleviate anemia some. Was delayed by 1 week due to flu, here for rx today  REVIEW OF SYSTEMS:     No issues on 14 point review today. Feeling greatAV access placed recently slightly painful with manipulation    PHYSICAL EXAM:     Vitals:    06/01/17 1017   BP: (!) 149/61   Pulse: 61   Resp: 18   Temp: 97.2 °F (36.2 °C)       GENERAL: Comfortable looking patient. Patient is in no distress.  Awake, alert and oriented to time, person and place.  No anxiety, or agitation.      HEENT: Normal conjunctivae and eyelids. WNL.  PERRLA 3 to 4 mm. No icterus, no pallor, no congestion, and no discharge noted.     NECK:  Supple. Trachea is central.  No crepitus.  No JVD or masses.    RESPIRATORY:  No intercostal retractions.  No dullness to percussion.  Chest is clear to auscultation.  No rales, rhonchi or wheezes.  No crepitus.  Good air entry bilaterally.    CARDIOVASCULAR:  S1 and S2 are normally heard without murmurs or gallops.  All peripheral pulses are present.    ABDOMEN:  Normal abdomen.  No hepatosplenomegaly.  No free fluid.  Bowel sounds are present.  No hernia noted. No masses.  No rebound or tenderness.  No guarding or rigidity.  Umbilicus is midline.    LYMPHATICS:  No axillary, cervical, supraclavicular, submental, or inguinal lymphadenopathy.    SKIN/MUSCULOSKELETAL:  There is no evidence of excoriation marks or ecchmosis.  No rashes.  No cyanosis.  No clubbing.  No joint or skeletal deformities noted.  Normal range of motion.    NEUROLOGIC:  Higher functions are appropriate.  No cranial nerve deficits.  Normal fadia.  Normal strength.  Motor and sensory functions are normal.  Deep tendon reflexes are normal.    GENITAL/RECTAL:   Exams are deferred.      Laboratory:     CBC:  Lab Results   Component Value Date    WBC 3.54 (L) 06/01/2017    RBC 3.05 (L) 06/01/2017    HGB 9.8 (L) 06/01/2017    HCT 31.8 (L) 06/01/2017     (H) 06/01/2017    MCH 32.1 (H) 06/01/2017    MCHC 30.8 (L) 06/01/2017    RDW 17.2 (H) 06/01/2017     06/01/2017    MPV 9.1 (L) 06/01/2017    GRAN 1.7 (L) 06/01/2017    GRAN 47.6 06/01/2017    LYMPH 0.8 (L) 06/01/2017    LYMPH 22.0 06/01/2017    MONO 0.6 06/01/2017    MONO 15.5 (H) 06/01/2017    EOS 0.5 06/01/2017    BASO 0.03 06/01/2017    EOSINOPHIL 14.1 (H) 06/01/2017    BASOPHIL 0.8 06/01/2017       BMP: BMP  Lab Results   Component Value Date     06/01/2017    K 4.1 06/01/2017    CL 94 (L) 06/01/2017    CO2 37 (H) 06/01/2017    BUN 27 (H) 06/01/2017    CREATININE 2.95 (H) 06/01/2017    CALCIUM 8.4 06/01/2017    ANIONGAP 7 (L) 06/01/2017    ESTGFRAFRICA 17 (A) 06/01/2017    EGFRNONAA 15 (A) 06/01/2017       LFT:   Lab Results   Component Value Date    ALT 29 06/01/2017    AST 25 06/01/2017    GGT 25 09/02/2014    ALKPHOS 88 06/01/2017    BILITOT 0.6 06/01/2017         Assessment/Plan:       1. Hereditary hemolytic anemia    2. Acute rejection of kidney transplant 2/2016    3. CKD (chronic kidney disease) stage 5, GFR less than 15 ml/min    4. Bilateral pleural effusion    5. Liver transplanted    6. Gastrointestinal hemorrhage with melena        Has AV graft in place .   hgb  9.8 it has gradually decreased since her GI bleed and transfusion in 4/2017, will proceed with rituxan 375 mg/m2   rtc 3 weeks with cbc, cmp  Receiving epogen during dialysis   The hemolysis may have been related to immunosuppressive agents used for the kidney,    Cont with liver transplant clinic.  Cont dialysis   has endoscope planned for later this month for f/u of GI bleed

## 2017-06-08 ENCOUNTER — INFUSION (OUTPATIENT)
Dept: INFUSION THERAPY | Facility: HOSPITAL | Age: 72
End: 2017-06-08
Attending: INTERNAL MEDICINE
Payer: MEDICARE

## 2017-06-08 VITALS
SYSTOLIC BLOOD PRESSURE: 174 MMHG | HEART RATE: 57 BPM | DIASTOLIC BLOOD PRESSURE: 74 MMHG | RESPIRATION RATE: 18 BRPM | BODY MASS INDEX: 16.28 KG/M2 | WEIGHT: 101.31 LBS | HEIGHT: 66 IN | TEMPERATURE: 98 F

## 2017-06-08 DIAGNOSIS — D64.9 SEVERE ANEMIA: Primary | ICD-10-CM

## 2017-06-08 PROCEDURE — 96415 CHEMO IV INFUSION ADDL HR: CPT | Mod: PN

## 2017-06-08 PROCEDURE — 25000003 PHARM REV CODE 250: Mod: PN | Performed by: INTERNAL MEDICINE

## 2017-06-08 PROCEDURE — 63600175 PHARM REV CODE 636 W HCPCS: Mod: PN | Performed by: INTERNAL MEDICINE

## 2017-06-08 PROCEDURE — 96367 TX/PROPH/DG ADDL SEQ IV INF: CPT | Mod: PN

## 2017-06-08 PROCEDURE — 96413 CHEMO IV INFUSION 1 HR: CPT | Mod: PN

## 2017-06-08 RX ORDER — SODIUM CHLORIDE 0.9 % (FLUSH) 0.9 %
10 SYRINGE (ML) INJECTION
Status: DISCONTINUED | OUTPATIENT
Start: 2017-06-08 | End: 2017-06-08 | Stop reason: HOSPADM

## 2017-06-08 RX ORDER — MEPERIDINE HYDROCHLORIDE 50 MG/ML
25 INJECTION INTRAMUSCULAR; INTRAVENOUS; SUBCUTANEOUS
Status: DISCONTINUED | OUTPATIENT
Start: 2017-06-08 | End: 2017-06-08 | Stop reason: HOSPADM

## 2017-06-08 RX ORDER — HEPARIN 100 UNIT/ML
500 SYRINGE INTRAVENOUS
Status: DISCONTINUED | OUTPATIENT
Start: 2017-06-08 | End: 2017-06-08 | Stop reason: HOSPADM

## 2017-06-08 RX ORDER — ACETAMINOPHEN 325 MG/1
650 TABLET ORAL
Status: COMPLETED | OUTPATIENT
Start: 2017-06-08 | End: 2017-06-08

## 2017-06-08 RX ORDER — FAMOTIDINE 20 MG/50ML
20 INJECTION, SOLUTION INTRAVENOUS
Status: COMPLETED | OUTPATIENT
Start: 2017-06-08 | End: 2017-06-08

## 2017-06-08 RX ADMIN — FAMOTIDINE 20 MG: 20 INJECTION, SOLUTION INTRAVENOUS at 09:06

## 2017-06-08 RX ADMIN — RITUXIMAB 630 MG: 10 INJECTION, SOLUTION INTRAVENOUS at 10:06

## 2017-06-08 RX ADMIN — ACETAMINOPHEN 650 MG: 325 TABLET ORAL at 09:06

## 2017-06-08 RX ADMIN — DIPHENHYDRAMINE HYDROCHLORIDE 25 MG: 50 INJECTION, SOLUTION INTRAMUSCULAR; INTRAVENOUS at 09:06

## 2017-06-08 RX ADMIN — SODIUM CHLORIDE: 9 INJECTION, SOLUTION INTRAVENOUS at 09:06

## 2017-06-12 ENCOUNTER — LAB VISIT (OUTPATIENT)
Dept: LAB | Facility: HOSPITAL | Age: 72
End: 2017-06-12
Attending: INTERNAL MEDICINE
Payer: MEDICARE

## 2017-06-12 DIAGNOSIS — Z94.4 LIVER TRANSPLANTED: ICD-10-CM

## 2017-06-12 LAB
ANISOCYTOSIS BLD QL SMEAR: SLIGHT
BASOPHILS # BLD AUTO: 0.04 K/UL
BASOPHILS NFR BLD: 1.4 %
DIFFERENTIAL METHOD: ABNORMAL
EOSINOPHIL # BLD AUTO: 0.6 K/UL
EOSINOPHIL NFR BLD: 21.3 %
ERYTHROCYTE [DISTWIDTH] IN BLOOD BY AUTOMATED COUNT: 17.5 %
HCT VFR BLD AUTO: 37.8 %
HGB BLD-MCNC: 11.7 G/DL
HYPOCHROMIA BLD QL SMEAR: ABNORMAL
LYMPHOCYTES # BLD AUTO: 0.9 K/UL
LYMPHOCYTES NFR BLD: 30.2 %
MCH RBC QN AUTO: 32 PG
MCHC RBC AUTO-ENTMCNC: 31 %
MCV RBC AUTO: 103 FL
MONOCYTES # BLD AUTO: 0.4 K/UL
MONOCYTES NFR BLD: 15.1 %
NEUTROPHILS # BLD AUTO: 0.9 K/UL
NEUTROPHILS NFR BLD: 32 %
PLATELET # BLD AUTO: 264 K/UL
PLATELET BLD QL SMEAR: ABNORMAL
PMV BLD AUTO: 9.3 FL
RBC # BLD AUTO: 3.66 M/UL
WBC # BLD AUTO: 2.91 K/UL

## 2017-06-12 PROCEDURE — 36415 COLL VENOUS BLD VENIPUNCTURE: CPT | Mod: PO

## 2017-06-12 PROCEDURE — 80053 COMPREHEN METABOLIC PANEL: CPT

## 2017-06-12 PROCEDURE — 80197 ASSAY OF TACROLIMUS: CPT

## 2017-06-12 PROCEDURE — 85025 COMPLETE CBC W/AUTO DIFF WBC: CPT

## 2017-06-13 LAB
ALBUMIN SERPL BCP-MCNC: 3.7 G/DL
ALP SERPL-CCNC: 127 U/L
ALT SERPL W/O P-5'-P-CCNC: 15 U/L
ANION GAP SERPL CALC-SCNC: 10 MMOL/L
AST SERPL-CCNC: 26 U/L
BILIRUB SERPL-MCNC: 0.9 MG/DL
BUN SERPL-MCNC: 11 MG/DL
CALCIUM SERPL-MCNC: 8.7 MG/DL
CHLORIDE SERPL-SCNC: 96 MMOL/L
CO2 SERPL-SCNC: 33 MMOL/L
CREAT SERPL-MCNC: 2.1 MG/DL
EST. GFR  (AFRICAN AMERICAN): 26.3 ML/MIN/1.73 M^2
EST. GFR  (NON AFRICAN AMERICAN): 22.8 ML/MIN/1.73 M^2
GLUCOSE SERPL-MCNC: 90 MG/DL
POTASSIUM SERPL-SCNC: 4.2 MMOL/L
PROT SERPL-MCNC: 7.2 G/DL
SODIUM SERPL-SCNC: 139 MMOL/L
TACROLIMUS BLD-MCNC: 2.7 NG/ML

## 2017-06-14 ENCOUNTER — TELEPHONE (OUTPATIENT)
Dept: TRANSPLANT | Facility: CLINIC | Age: 72
End: 2017-06-14

## 2017-06-14 DIAGNOSIS — Z94.4 LIVER TRANSPLANTED: Primary | ICD-10-CM

## 2017-06-14 NOTE — LETTER
June 14, 2017    Dacia Hamilton  [address] [facility] [address]          Dear Dacia Hamilton:  MRN: [id number]    Your lab results were stable.  There are no medicine changes.  Please have your labs drawn again on 9/11/17.      If you cannot have your labs drawn on the scheduled date, it is your responsibility to call the transplant department to reschedule.  To reschedule or make an appointment, please as to speak to or leave a message for my assistant, Kae Hart, at [phone].  When leaving a message for Hailey Pal, or myself, we ask that you leave a brief message regarding your request.    Sincerely,    Lorin Mejia, RN, BSN  Liver Transplant Coordinator  [facility]  [address] [place] [address] [phone]

## 2017-06-14 NOTE — LETTER
June 14, 2017    Dacia Hamilton  [address] [facility] [address]          Dear Dacia Hamilton:  MRN: [id number]    Your lab results were stable.  There are no medicine changes.  Please have your labs drawn again on 9/11/17.      If you cannot have your labs drawn on the scheduled date, it is your responsibility to call the transplant department to reschedule.  To reschedule or make an appointment, please as to speak to or leave a message for my assistant, Kae Hart, at [phone].  When leaving a message for Hailey Pal, or myself, we ask that you leave a brief message regarding your request.    Sincerely,    Lorin Mejia, RN, BSN  Liver Transplant Coordinator  [facility]  [address] [place] [id number] [phone]

## 2017-06-28 NOTE — H&P
History & Physical - Short Stay  Gastroenterology      SUBJECTIVE:     Procedure: Gastroscopy    Chief Complaint/Indication for Procedure:  F/U of gastric ulcer.  Occ dysphagia.  Other PMH:  1. Hereditary hemolytic anemia    2. Acute rejection of kidney transplant 2/2016    3. CKD (chronic kidney disease) stage 5, GFR less than 15 ml/min    4. Bilateral pleural effusion    5. Liver transplanted    6. Gastrointestinal hemorrhage with melena        History of Present Illness:  See last EGD 4/19/2017  Impression:   - Small papilloma roof of the mouth.                        - Normal oropharynx.                        - Normal esophagus.                        - Z-line regular, 40 cm from the incisors.                        - Normal cardia, gastric fundus and gastric body.                        - Non-bleeding gastric ulcer with a visible vessel. Injected with epinephrine.                        - Antritis. Biopsied.                        - Normal examined duodenum.  Recommendation:       - Return patient to ICU for ongoing care.                        - Await CLOtest results.                        - Continue present medications.                        - Give Protonix (pantoprazole): 8 mg/hr IV by                         continuous infusion.                        - Use sucralfate tablets 2 gram PO QID.                        - Repeat upper endoscopy in 2-3 months to check healing.                        - Refer to ENT to assess the papilloma  Wisam Gibbons MD  4/19/2017     PTA Medications   Medication Sig    B complex-vitamin C-folic acid (FOLBEE PLUS) 5 mg Tab Take 1 tablet by mouth every evening.    cloNIDine 0.1 mg/24 hr td ptwk (CATAPRES) 0.1 mg/24 hr Place 1 patch onto the skin every 7 days. Every Wednesday    clopidogrel (PLAVIX) 75 mg tablet Take 75 mg by mouth once daily.    fish oil-omega-3 fatty acids 300-1,000 mg capsule Take 2 g by mouth once daily.      folic acid (FOLVITE) 400 MCG tablet  Take 800 mcg by mouth once daily.    furosemide (LASIX) 80 MG tablet Take 1 tablet (80 mg total) by mouth once daily.    levothyroxine (SYNTHROID) 75 MCG tablet Take 1 tablet (75 mcg total) by mouth once daily.    lidocaine-prilocaine (EMLA) cream APPLY TO AFFECTED AREA (FISTULA) 1 HOUR PRIOR TO HD    losartan (COZAAR) 50 MG tablet One po at noon (Patient taking differently: Take 50 mg by mouth once daily. One po at noon)    magnesium oxide (MAG-OX) 400 mg tablet Take 1 tablet (400 mg total) by mouth 3 (three) times daily. (Patient taking differently: Take 400 mg by mouth 2 (two) times daily. )    metoprolol tartrate (LOPRESSOR) 50 MG tablet Take 1 tablet (50 mg total) by mouth 2 (two) times daily.    pantoprazole (PROTONIX) 40 MG tablet Take 1 tablet (40 mg total) by mouth 2 (two) times daily.    PROPYLENE GLYCOL/ (SYSTANE, PROPYLENE GLYCOL, OPHT) Apply 1 drop to eye once daily. 1 drop every day both eyes    RITUXIMAB (RITUXAN IV) Inject into the vein once a week. Administered at [facility]    tacrolimus (PROGRAF) 1 MG Cap Take 1 capsule (1 mg total) by mouth every 12 (twelve) hours.    zolpidem (AMBIEN) 5 MG Tab Take 1 tablet (5 mg total) by mouth nightly as needed (insomnia). At bedtime    alprazolam (XANAX) 0.25 MG tablet One po prn anxiety not to exceed one per day (Patient taking differently: Take 0.25 mg by mouth daily as needed. )    benzonatate (TESSALON) 100 MG capsule Take 100 mg by mouth 3 (three) times daily as needed for Cough.    cholestyramine (QUESTRAN) 4 gram packet Take 4 g by mouth daily as needed.    phenyleph-min oil-petrolatum (PREPARATION H) 0.25-14-74.9 % Oint Place 1 applicator rectally 4 (four) times daily as needed.    promethazine (PHENERGAN) 12.5 MG Tab Take 1 tablet (12.5 mg total) by mouth every 6 (six) hours as needed (nausea). May make you sleep       Review of patient's allergies indicates:   Allergen Reactions    Flonase [fluticasone]      Local  blisters    Banana Nausea Only        Past Medical History:   Diagnosis Date    Anemia     BK viruria 2011    Bursitis of left hip     Cataract     CHF (congestive heart failure)     Chronic kidney disease     Disorder of kidney and ureter     Encounter for blood transfusion     GERD (gastroesophageal reflux disease)     takes prilosec    Hyperlipidemia     Hypertension     Hypothyroidism     Influenza 2017    Osteoarthritis     Osteoporosis     Peptic ulcer disease     Primary biliary cirrhosis , ,     3 Liver Transplants (first 2 in same mo)    Sciatica     Urinary tract infection      Past Surgical History:   Procedure Laterality Date    2 previous liver transplant  1991-     at [place]    APPENDECTOMY      AV FISTULA PLACEMENT Left 2017    CATARACT EXTRACTION BILATERAL W/ ANTERIOR VITRECTOMY       SECTION, CLASSIC      CHOLECYSTECTOMY      COLONOSCOPY  2014    Dr. Sánchez; normal findings per patient report    combined liver-kidney transplant  2006    Primary Biliary Cirrhosis    CYSTOSCOPY      EYE SURGERY      hepaticojejunostomy (pretty en Y)  10/2006    KIDNEY TRANSPLANT      25% of Both Kidneys/Third Kidney    LIVER TRANSPLANT      #3 liver transplants     TONSILLECTOMY      TUBAL LIGATION  1968    UPPER GASTROINTESTINAL ENDOSCOPY  2017    Dr. Gibbons, repeat in 2-3 months     Family History   Problem Relation Age of Onset    Hypertension Mother     Hypertension Father     No Known Problems Sister     Hypertension Brother     No Known Problems Brother     Kidney disease Neg Hx     Colon cancer Neg Hx     Colon polyps Neg Hx     Crohn's disease Neg Hx     Ulcerative colitis Neg Hx      Social History   Substance Use Topics    Smoking status: Never Smoker    Smokeless tobacco: Never Used    Alcohol use No         OBJECTIVE:     Vital Signs (Most Recent)  Temp: 97.5 °F (36.4 °C) (17 1144)  Pulse:  "(!) 56 (06/29/17 1144)  Resp: 18 (06/29/17 1144)  BP: (!) 170/22 (06/29/17 1144)  SpO2: 97 % (room air) (06/29/17 1144)    Physical Exam:   :  Ht 5' 5.5" (1.664 m)    Wt 47.2 kg (104 lb 0.9 oz)    BMI 17.05 kg/m²                                   GENERAL:  Comfortable, in no acute distress.                                 HEENT EXAM:  Nonicteric.  No adenopathy.  Oropharynx is clear.               NECK:  Supple.                                                               LUNGS:  Clear.                                                               CARDIAC:  Regular rate and rhythm.  S1, S2.  No murmur.                      ABDOMEN:  Soft, positive bowel sounds, nontender.  No hepatosplenomegaly or masses.  No rebound or guarding.                                             EXTREMITIES:  No edema.     MENTAL STATUS:  Alert and oriented.    ASSESSMENT/PLAN:     Assessment:  F/U of gastric ulcer.  Occ dysphagia.    Plan: Gastroscopy    Anesthesia Plan:   MAC / General Anaesthesia    ASA Grade: ASA 2 - Patient with mild systemic disease with no functional limitations    MALLAMPATI SCORE: II (hard and soft palate, upper portion of tonsils anduvula visible)    "

## 2017-06-29 ENCOUNTER — SURGERY (OUTPATIENT)
Age: 72
End: 2017-06-29

## 2017-06-29 ENCOUNTER — ANESTHESIA EVENT (OUTPATIENT)
Dept: ENDOSCOPY | Facility: HOSPITAL | Age: 72
End: 2017-06-29
Payer: MEDICARE

## 2017-06-29 ENCOUNTER — HOSPITAL ENCOUNTER (OUTPATIENT)
Facility: HOSPITAL | Age: 72
Discharge: HOME | End: 2017-06-29
Attending: INTERNAL MEDICINE | Admitting: INTERNAL MEDICINE
Payer: MEDICARE

## 2017-06-29 ENCOUNTER — ANESTHESIA (OUTPATIENT)
Dept: ENDOSCOPY | Facility: HOSPITAL | Age: 72
End: 2017-06-29
Payer: MEDICARE

## 2017-06-29 VITALS
RESPIRATION RATE: 16 BRPM | HEIGHT: 65 IN | HEART RATE: 54 BPM | BODY MASS INDEX: 16.33 KG/M2 | SYSTOLIC BLOOD PRESSURE: 194 MMHG | OXYGEN SATURATION: 100 % | DIASTOLIC BLOOD PRESSURE: 86 MMHG | TEMPERATURE: 98 F | WEIGHT: 98 LBS | RESPIRATION RATE: 20 BRPM

## 2017-06-29 DIAGNOSIS — Z87.11 HISTORY OF STOMACH ULCERS: Primary | ICD-10-CM

## 2017-06-29 LAB — H PYLORI INDEX VALUE: NEGATIVE

## 2017-06-29 PROCEDURE — 27201012 HC FORCEPS, HOT/COLD, DISP: Mod: PO | Performed by: INTERNAL MEDICINE

## 2017-06-29 PROCEDURE — 87449 NOS EACH ORGANISM AG IA: CPT | Mod: PO | Performed by: INTERNAL MEDICINE

## 2017-06-29 PROCEDURE — 43239 EGD BIOPSY SINGLE/MULTIPLE: CPT | Mod: PO | Performed by: INTERNAL MEDICINE

## 2017-06-29 PROCEDURE — 25000003 PHARM REV CODE 250: Mod: PO | Performed by: ANESTHESIOLOGY

## 2017-06-29 PROCEDURE — 43248 EGD GUIDE WIRE INSERTION: CPT | Mod: ,,, | Performed by: INTERNAL MEDICINE

## 2017-06-29 PROCEDURE — 25000003 PHARM REV CODE 250: Mod: PO | Performed by: INTERNAL MEDICINE

## 2017-06-29 PROCEDURE — 63600175 PHARM REV CODE 636 W HCPCS: Mod: PO | Performed by: NURSE ANESTHETIST, CERTIFIED REGISTERED

## 2017-06-29 PROCEDURE — 37000008 HC ANESTHESIA 1ST 15 MINUTES: Mod: PO | Performed by: INTERNAL MEDICINE

## 2017-06-29 PROCEDURE — 88305 TISSUE EXAM BY PATHOLOGIST: CPT | Mod: 26,,, | Performed by: PATHOLOGY

## 2017-06-29 PROCEDURE — D9220A PRA ANESTHESIA: Mod: CRNA,,, | Performed by: NURSE ANESTHETIST, CERTIFIED REGISTERED

## 2017-06-29 PROCEDURE — D9220A PRA ANESTHESIA: Mod: ANES,,, | Performed by: ANESTHESIOLOGY

## 2017-06-29 PROCEDURE — 43239 EGD BIOPSY SINGLE/MULTIPLE: CPT | Mod: ,,, | Performed by: INTERNAL MEDICINE

## 2017-06-29 PROCEDURE — 88305 TISSUE EXAM BY PATHOLOGIST: CPT | Performed by: PATHOLOGY

## 2017-06-29 PROCEDURE — 43248 EGD GUIDE WIRE INSERTION: CPT | Mod: PO | Performed by: INTERNAL MEDICINE

## 2017-06-29 PROCEDURE — 37000009 HC ANESTHESIA EA ADD 15 MINS: Mod: PO | Performed by: INTERNAL MEDICINE

## 2017-06-29 PROCEDURE — 25000003 PHARM REV CODE 250: Mod: PO | Performed by: NURSE ANESTHETIST, CERTIFIED REGISTERED

## 2017-06-29 RX ORDER — SODIUM CHLORIDE, SODIUM LACTATE, POTASSIUM CHLORIDE, CALCIUM CHLORIDE 600; 310; 30; 20 MG/100ML; MG/100ML; MG/100ML; MG/100ML
INJECTION, SOLUTION INTRAVENOUS CONTINUOUS
Status: DISCONTINUED | OUTPATIENT
Start: 2017-06-29 | End: 2017-06-29

## 2017-06-29 RX ORDER — GLYCOPYRROLATE 0.2 MG/ML
INJECTION INTRAMUSCULAR; INTRAVENOUS
Status: DISCONTINUED | OUTPATIENT
Start: 2017-06-29 | End: 2017-06-29

## 2017-06-29 RX ORDER — SODIUM CHLORIDE 9 MG/ML
INJECTION, SOLUTION INTRAVENOUS CONTINUOUS
Status: DISCONTINUED | OUTPATIENT
Start: 2017-06-29 | End: 2017-06-29 | Stop reason: HOSPADM

## 2017-06-29 RX ORDER — PANTOPRAZOLE SODIUM 40 MG/1
40 TABLET, DELAYED RELEASE ORAL
Qty: 30 TABLET | Refills: 11 | Status: SHIPPED | OUTPATIENT
Start: 2017-06-29 | End: 2018-05-02 | Stop reason: SDUPTHER

## 2017-06-29 RX ORDER — PROPOFOL 10 MG/ML
VIAL (ML) INTRAVENOUS
Status: DISCONTINUED | OUTPATIENT
Start: 2017-06-29 | End: 2017-06-29

## 2017-06-29 RX ORDER — PANTOPRAZOLE SODIUM 40 MG/1
40 TABLET, DELAYED RELEASE ORAL
Qty: 30 TABLET | Refills: 11 | Status: SHIPPED | OUTPATIENT
Start: 2017-06-29 | End: 2017-06-29

## 2017-06-29 RX ORDER — LIDOCAINE HCL/PF 100 MG/5ML
SYRINGE (ML) INTRAVENOUS
Status: DISCONTINUED | OUTPATIENT
Start: 2017-06-29 | End: 2017-06-29

## 2017-06-29 RX ORDER — LIDOCAINE HYDROCHLORIDE 10 MG/ML
1 INJECTION INFILTRATION; PERINEURAL ONCE
Status: COMPLETED | OUTPATIENT
Start: 2017-06-29 | End: 2017-06-29

## 2017-06-29 RX ADMIN — TOPICAL ANESTHETIC 1 EACH: 200 SPRAY DENTAL; PERIODONTAL at 11:06

## 2017-06-29 RX ADMIN — LIDOCAINE HYDROCHLORIDE: 10 INJECTION, SOLUTION EPIDURAL; INFILTRATION; INTRACAUDAL; PERINEURAL at 11:06

## 2017-06-29 RX ADMIN — PROPOFOL 50 MG: 10 INJECTION, EMULSION INTRAVENOUS at 11:06

## 2017-06-29 RX ADMIN — GLYCOPYRROLATE 0.2 MG: 0.2 INJECTION, SOLUTION INTRAMUSCULAR; INTRAVENOUS at 11:06

## 2017-06-29 RX ADMIN — SODIUM CHLORIDE: 0.9 INJECTION, SOLUTION INTRAVENOUS at 11:06

## 2017-06-29 RX ADMIN — LIDOCAINE HYDROCHLORIDE 100 MG: 20 INJECTION, SOLUTION INTRAVENOUS at 11:06

## 2017-06-29 RX ADMIN — PROPOFOL 100 MG: 10 INJECTION, EMULSION INTRAVENOUS at 11:06

## 2017-06-29 NOTE — BRIEF OP NOTE
Discharge Note  Short Stay      SUMMARY     Admit Date: 6/29/2017    Attending Physician: Wisam Gibbons Jr., MD     Discharge Physician: Wisam Gibbons Jr., MD    Discharge Date: 6/29/2017 12:32 PM    Final Diagnosis: History of gastric ulcer [Z87.19]  Anemia, unspecified type [D64.9]  Impression:          - Normal oropharynx.                       - Normal upper third of esophagus, middle third of                        esophagus and lower third of esophagus.                       - Benign-appearing esophageal stricture. Dilated, 48 Fr.                       - Z-line regular, 39 cm from the incisors.                       - Normal cardia.                       - Erythematous mucosa / regenerative mucosa in the                        lesser curvature of the gastric antrum, site of                        previous ulcer. Biopsied.                       - Normal stomach otherwise.                       - Normal examined duodenum.  Recommendation:      - Discharge patient to home.                       - Await pathology and CLOtest results.                       - Observe patient's clinical course following                        today's procedure with therapeutic intervention.                       - Follow an antireflux regimen.                       - Continue present medications.                       - Use Protonix (pantoprazole) 40 mg PO daily.                       - Return to GI clinic PRN.  Wisam Gibbons MD  6/29/2017   Disposition: HOME OR SELF CARE    Patient Instructions:   Current Discharge Medication List      CONTINUE these medications which have CHANGED    Details   pantoprazole (PROTONIX) 40 MG tablet Take 1 tablet (40 mg total) by mouth before breakfast.  Qty: 30 tablet, Refills: 11         CONTINUE these medications which have NOT CHANGED    Details   B complex-vitamin C-folic acid (FOLBEE PLUS) 5 mg Tab Take 1 tablet by mouth every evening.  Qty: 90 each, Refills: 3      cloNIDine 0.1  mg/24 hr td ptwk (CATAPRES) 0.1 mg/24 hr Place 1 patch onto the skin every 7 days. Every Wednesday  Qty: 12 patch, Refills: 6      clopidogrel (PLAVIX) 75 mg tablet Take 75 mg by mouth once daily.      fish oil-omega-3 fatty acids 300-1,000 mg capsule Take 2 g by mouth once daily.        folic acid (FOLVITE) 400 MCG tablet Take 800 mcg by mouth once daily.      furosemide (LASIX) 80 MG tablet Take 1 tablet (80 mg total) by mouth once daily.  Qty: 90 tablet, Refills: 3      levothyroxine (SYNTHROID) 75 MCG tablet Take 1 tablet (75 mcg total) by mouth once daily.  Qty: 30 tablet, Refills: 4      lidocaine-prilocaine (EMLA) cream APPLY TO AFFECTED AREA (FISTULA) 1 HOUR PRIOR TO HD  Refills: 3      losartan (COZAAR) 50 MG tablet One po at noon  Qty: 90 tablet, Refills: 3      magnesium oxide (MAG-OX) 400 mg tablet Take 1 tablet (400 mg total) by mouth 3 (three) times daily.  Qty: 90 tablet, Refills: 11    Associated Diagnoses: Hypomagnesemia      metoprolol tartrate (LOPRESSOR) 50 MG tablet Take 1 tablet (50 mg total) by mouth 2 (two) times daily.  Qty: 180 tablet, Refills: 3      PROPYLENE GLYCOL/ (SYSTANE, PROPYLENE GLYCOL, OPHT) Apply 1 drop to eye once daily. 1 drop every day both eyes      RITUXIMAB (RITUXAN IV) Inject into the vein once a week. Administered at [facility]      tacrolimus (PROGRAF) 1 MG Cap Take 1 capsule (1 mg total) by mouth every 12 (twelve) hours.  Qty: 180 capsule, Refills: 3    Associated Diagnoses: H/O kidney transplant      zolpidem (AMBIEN) 5 MG Tab Take 1 tablet (5 mg total) by mouth nightly as needed (insomnia). At bedtime  Qty: 30 tablet, Refills: 1      alprazolam (XANAX) 0.25 MG tablet One po prn anxiety not to exceed one per day  Qty: 30 tablet, Refills: 0      benzonatate (TESSALON) 100 MG capsule Take 100 mg by mouth 3 (three) times daily as needed for Cough.      cholestyramine (QUESTRAN) 4 gram packet Take 4 g by mouth daily as needed.      phenyleph-min  oil-petrolatum (PREPARATION H) 0.25-14-74.9 % Oint Place 1 applicator rectally 4 (four) times daily as needed.      promethazine (PHENERGAN) 12.5 MG Tab Take 1 tablet (12.5 mg total) by mouth every 6 (six) hours as needed (nausea). May make you sleep  Qty: 45 tablet, Refills: 1             Discharge Procedure Orders (must include Diet, Follow-up, Activity)    Follow Up:  Follow up with PCP as per your routine.  Please follow an anti reflux diet.  Activity as tolerated.    No driving day of procedure.

## 2017-06-29 NOTE — ANESTHESIA POSTPROCEDURE EVALUATION
"Anesthesia Post Evaluation    Patient: Dacia Hamilton    Procedure(s) Performed: Procedure(s) (LRB):  ESOPHAGOGASTRODUODENOSCOPY (EGD) (N/A)    Final Anesthesia Type: general  Patient location during evaluation: PACU  Patient participation: Yes- Able to Participate  Level of consciousness: awake and alert and oriented  Post-procedure vital signs: reviewed and stable  Pain management: adequate  Airway patency: patent  PONV status at discharge: No PONV  Anesthetic complications: no      Cardiovascular status: blood pressure returned to baseline  Respiratory status: unassisted, spontaneous ventilation and room air  Hydration status: euvolemic  Follow-up not needed.        Visit Vitals  BP (!) 194/86   Pulse (!) 54   Temp 36.4 °C (97.5 °F) (Temporal)   Resp 20   Ht 5' 5" (1.651 m)   Wt 44.5 kg (98 lb)   LMP  (LMP Unknown)   SpO2 100%   Breastfeeding? No   BMI 16.31 kg/m²       Pain/Jovan Score: Pain Assessment Performed: Yes (6/29/2017 12:16 PM)  Presence of Pain: denies (6/29/2017  1:00 PM)  Jovan Score: 10 (6/29/2017 12:23 PM)      "

## 2017-06-29 NOTE — ANESTHESIA PREPROCEDURE EVALUATION
06/29/2017  Dacia Hamilton is a 71 y.o., female.    Anesthesia Evaluation    I have reviewed the Patient Summary Reports.    I have reviewed the Nursing Notes.   I have reviewed the Medications.     Review of Systems  Social:  Non-Smoker    Cardiovascular:   Hypertension CHF    Pulmonary:   Shortness of breath    Renal/:   Chronic Renal Disease    Hepatic/GI:   PUD, GERD, well controlled Liver Disease,    Musculoskeletal:   Arthritis     Neurological:   Neuromuscular Disease,    Endocrine:   Hypothyroidism        Physical Exam  General:  Well nourished            Mental Status:  Mental Status Findings:  Cooperative, Alert and Oriented         Anesthesia Plan  Type of Anesthesia, risks & benefits discussed:  Anesthesia Type:  general  Patient's Preference:   Intra-op Monitoring Plan:   Intra-op Monitoring Plan Comments:   Post Op Pain Control Plan:   Post Op Pain Control Plan Comments:   Induction:   IV  Beta Blocker:  Patient is on a Beta-Blocker and has received one dose within the past 24 hours (No further documentation required).       Informed Consent: Patient understands risks and agrees with Anesthesia plan.  Questions answered. Anesthesia consent signed with patient.  ASA Score: 3     Day of Surgery Review of History & Physical: I have interviewed and examined the patient. I have reviewed the patient's H&P dated:  There are no significant changes.          Ready For Surgery From Anesthesia Perspective.

## 2017-06-29 NOTE — TRANSFER OF CARE
"Anesthesia Transfer of Care Note    Patient: Dacia Hamilton    Procedure(s) Performed: Procedure(s) (LRB):  ESOPHAGOGASTRODUODENOSCOPY (EGD) (N/A)    Patient location: PACU    Anesthesia Type: general    Transport from OR: Transported from OR on 2-3 L/min O2 by NC with adequate spontaneous ventilation    Post pain: adequate analgesia    Post assessment: no apparent anesthetic complications    Post vital signs: stable    Level of consciousness: sedated    Nausea/Vomiting: no nausea/vomiting    Complications: none    Transfer of care protocol was followed      Last vitals:   Visit Vitals  BP (!) 170/22 (BP Location: Right arm, Patient Position: Lying, BP Method: Automatic)   Pulse (!) 56   Temp 36.4 °C (97.5 °F) (Skin)   Resp 18   Ht 5' 5" (1.651 m)   Wt 44.5 kg (98 lb)   LMP  (LMP Unknown)   SpO2 97%   Breastfeeding? No   BMI 16.31 kg/m²     "

## 2017-06-29 NOTE — DISCHARGE INSTRUCTIONS
Procedural Sedation (Adult)  You have been given medicine by vein to make you sleep during your surgery. This may have included both a pain medicine and sleeping medicine. Most of the effects have worn off. But you may still have some drowsiness for the next 6 to 8 hours.  Home care  Follow these guidelines when you get home:  · For the next 8 hours, you should be watched by a responsible adult. This person should make sure your condition is not getting worse.  · Don't take any medicine by mouth for pain or for sleep during the next 4 hours. These might react with the medicines you were given in the hospital. This could cause a much stronger response than usual.  · Don't drink any alcohol for the next 24 hours.  · Don't drive, operate dangerous machinery, or make important business or personal decisions during the next 24 hours.  Follow-up care  Follow up with your healthcare provider if you are not alert and back to your usual level of activity within 12 hours.  When to seek medical advice  Call your healthcare provider right away if any of these occur:  · Drowsiness gets worse  · Weakness or dizziness gets worse  · Repeated vomiting  · You cannot be awakened   Date Last Reviewed: 10/18/2016  © 4561-1566 [employer]. [address] [place] [address]. All rights reserved. This information is not intended as a substitute for professional medical care. Always follow your healthcare professional's instructions.        Tips to Control Acid Reflux    To control acid reflux, youll need to make some basic diet and lifestyle changes. The simple steps outlined below may be all youll need to ease discomfort.  Watch what you eat  · Avoid fatty foods and spicy foods.  · Eat fewer acidic foods, such as citrus and tomato-based foods. These can increase symptoms.  · Limit drinking alcohol, caffeine, and fizzy beverages. All increase acid reflux.  · Try limiting chocolate, peppermint, and spearmint. These  can worsen acid reflux in some people.  Watch when you eat  · Avoid lying down for 3 hours after eating.  · Do not snack before going to bed.  Raise your head  Raising your head and upper body by 4 to 6 inches helps limit reflux when youre lying down. Put blocks under the head of your bed frame to raise it.  Other changes  · Lose weight, if you need to  · Dont exercise near bedtime  · Avoid tight-fitting clothes  · Limit aspirin and ibuprofen  · Stop smoking   Date Last Reviewed: 7/1/2016  © 9180-7668 [employer]. [address] [place] [address], [place] [address]. All rights reserved. This information is not intended as a substitute for professional medical care. Always follow your healthcare professional's instructions.

## 2017-06-29 NOTE — PLAN OF CARE
Awake, alert and tolerating po fluids well. No apparent distress noted. Meets the department guidelines for discharge. Instructions reviewed with patient and daughter,verbal understanding expressed. Family/friend to drive patient home.

## 2017-07-06 ENCOUNTER — TELEPHONE (OUTPATIENT)
Dept: INFUSION THERAPY | Facility: HOSPITAL | Age: 72
End: 2017-07-06

## 2017-07-06 ENCOUNTER — LAB VISIT (OUTPATIENT)
Dept: LAB | Facility: HOSPITAL | Age: 72
End: 2017-07-06
Attending: INTERNAL MEDICINE
Payer: MEDICARE

## 2017-07-06 ENCOUNTER — OFFICE VISIT (OUTPATIENT)
Dept: HEMATOLOGY/ONCOLOGY | Facility: CLINIC | Age: 72
End: 2017-07-06
Payer: MEDICARE

## 2017-07-06 VITALS
DIASTOLIC BLOOD PRESSURE: 53 MMHG | HEIGHT: 66 IN | WEIGHT: 102.94 LBS | SYSTOLIC BLOOD PRESSURE: 111 MMHG | RESPIRATION RATE: 16 BRPM | BODY MASS INDEX: 16.54 KG/M2 | TEMPERATURE: 98 F | HEART RATE: 56 BPM

## 2017-07-06 DIAGNOSIS — D58.9 HEREDITARY HEMOLYTIC ANEMIA: ICD-10-CM

## 2017-07-06 DIAGNOSIS — D58.9 HEREDITARY HEMOLYTIC ANEMIA: Primary | ICD-10-CM

## 2017-07-06 LAB
ALBUMIN SERPL BCP-MCNC: 4.1 G/DL
ALP SERPL-CCNC: 116 U/L
ALT SERPL W/O P-5'-P-CCNC: 25 U/L
ANION GAP SERPL CALC-SCNC: 10 MMOL/L
AST SERPL-CCNC: 28 U/L
BASOPHILS # BLD AUTO: 0.06 K/UL
BASOPHILS NFR BLD: 0.9 %
BILIRUB SERPL-MCNC: 0.6 MG/DL
BUN SERPL-MCNC: 27 MG/DL
CALCIUM SERPL-MCNC: 8.3 MG/DL
CHLORIDE SERPL-SCNC: 94 MMOL/L
CO2 SERPL-SCNC: 35 MMOL/L
CREAT SERPL-MCNC: 3.52 MG/DL
DIFFERENTIAL METHOD: ABNORMAL
EOSINOPHIL # BLD AUTO: 0.8 K/UL
EOSINOPHIL NFR BLD: 12.3 %
ERYTHROCYTE [DISTWIDTH] IN BLOOD BY AUTOMATED COUNT: 15.8 %
EST. GFR  (AFRICAN AMERICAN): 14 ML/MIN/1.73 M^2
EST. GFR  (NON AFRICAN AMERICAN): 12 ML/MIN/1.73 M^2
GLUCOSE SERPL-MCNC: 83 MG/DL
HCT VFR BLD AUTO: 36.5 %
HGB BLD-MCNC: 11.4 G/DL
LYMPHOCYTES # BLD AUTO: 1 K/UL
LYMPHOCYTES NFR BLD: 15.8 %
MCH RBC QN AUTO: 33.4 PG
MCHC RBC AUTO-ENTMCNC: 31.2 %
MCV RBC AUTO: 107 FL
MONOCYTES # BLD AUTO: 0.8 K/UL
MONOCYTES NFR BLD: 12.5 %
NEUTROPHILS # BLD AUTO: 3.7 K/UL
NEUTROPHILS NFR BLD: 58.5 %
NRBC BLD-RTO: 0 /100 WBC
PLATELET # BLD AUTO: 217 K/UL
PMV BLD AUTO: 9.2 FL
POTASSIUM SERPL-SCNC: 3.5 MMOL/L
PROT SERPL-MCNC: 6.7 G/DL
RBC # BLD AUTO: 3.41 M/UL
SODIUM SERPL-SCNC: 139 MMOL/L
WBC # BLD AUTO: 6.32 K/UL

## 2017-07-06 PROCEDURE — 80053 COMPREHEN METABOLIC PANEL: CPT

## 2017-07-06 PROCEDURE — 85025 COMPLETE CBC W/AUTO DIFF WBC: CPT

## 2017-07-06 PROCEDURE — 1157F ADVNC CARE PLAN IN RCRD: CPT | Mod: S$GLB,,, | Performed by: NURSE PRACTITIONER

## 2017-07-06 PROCEDURE — 99499 UNLISTED E&M SERVICE: CPT | Mod: S$PBB,,, | Performed by: NURSE PRACTITIONER

## 2017-07-06 PROCEDURE — 99999 PR PBB SHADOW E&M-EST. PATIENT-LVL IV: CPT | Mod: PBBFAC,,, | Performed by: NURSE PRACTITIONER

## 2017-07-06 PROCEDURE — 99213 OFFICE O/P EST LOW 20 MIN: CPT | Mod: S$GLB,,, | Performed by: NURSE PRACTITIONER

## 2017-07-06 PROCEDURE — 1159F MED LIST DOCD IN RCRD: CPT | Mod: S$GLB,,, | Performed by: NURSE PRACTITIONER

## 2017-07-06 PROCEDURE — 1126F AMNT PAIN NOTED NONE PRSNT: CPT | Mod: S$GLB,,, | Performed by: NURSE PRACTITIONER

## 2017-07-06 RX ORDER — SUCRALFATE 1 G/1
1 TABLET ORAL 4 TIMES DAILY
COMMUNITY
End: 2017-08-03

## 2017-07-06 NOTE — PROGRESS NOTES
HISTORY OF PRESENT ILLNESS:  This is a 71-year-old white female known to Dr. Metz for a   distant history of cirrhosis/sclerosing cholangitis for which the patient underwent hepatic   transplantation.  Immunosuppressant medications are thought to have resulted in renal failure.    She has undergone renal transplant, but has been developing worsening graft failure.  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    She returns to clinic for evaluation.  To Note: She presented to the ED at [facility] for overt melena and   weakness (04/18/17).  Dr. Gibbons performed an EGD and a non-bleeding ulcer was found with a   visible vessel.  It was injected with epinephrine.  A more recent EGD was performed that was normal.  She reports no further episodes.  No other pertinent findings on a 14-point review of systems.    PHYSICAL EXAMINATION:  GENERAL:  Well-developed, well nourished, elderly white female in no acute distress.  Alert & oriented x 3.  VITAL SIGNS:  Weight 102.9 pounds, stable.  Documented in EMR and reviewed.               HEENT:  Normocephalic, atraumatic.  Oral mucosa pink and moist.  Lips without   lesions.  Tongue midline.  Oropharynx clear.  Nonicteric sclerae.   NECK:  Supple, no adenopathy.  No carotid bruits, thyromegaly or thyroid nodule.  HEART:  Regular rate and rhythm without murmur, gallop or rub.                LUNGS:  Clear to auscultation bilaterally.  Normal respiratory effort.       ABDOMEN:  Soft, nontender, nondistended with positive normoactive bowel sounds,   no hepatosplenomegaly.    EXTREMITIES:  No cyanosis, clubbing or edema.  Distal pulses are intact. AV shunt to LFA  with positive thrill & bruit.      AXILLAE AND GROIN:  No palpable pathologic lymphadenopathy is appreciated.        SKIN:  Intact/turgor normal; gray.                                                  NEUROLOGIC:  Cranial nerves II-XII grossly intact.  Motor:  Good  muscle bulk and   tone.  Strength/sensory 5/5 throughout.  Gait stable.    LABORATORY:    Lab Results   Component Value Date    WBC 6.32 07/06/2017    HGB 11.4 (L) 07/06/2017    HCT 36.5 (L) 07/06/2017     (H) 07/06/2017     07/06/2017   Differential remarkable for 15.8% lymph  CMP  Sodium   Date Value Ref Range Status   07/06/2017 139 136 - 145 mmol/L Final     Potassium   Date Value Ref Range Status   07/06/2017 3.5 3.5 - 5.1 mmol/L Final     Chloride   Date Value Ref Range Status   07/06/2017 94 (L) 95 - 110 mmol/L Final     CO2   Date Value Ref Range Status   07/06/2017 35 (H) 22 - 31 mmol/L Final     Glucose   Date Value Ref Range Status   07/06/2017 83 70 - 110 mg/dL Final     Comment:     The ADA recommends the following guidelines for fasting glucose:  Normal:       less than 100 mg/dL  Prediabetes:  100 mg/dL to 125 mg/dL  Diabetes:     126 mg/dL or higher       BUN, Bld   Date Value Ref Range Status   07/06/2017 27 (H) 7 - 18 mg/dL Final     Creatinine   Date Value Ref Range Status   07/06/2017 3.52 (H) 0.50 - 1.40 mg/dL Final     Calcium   Date Value Ref Range Status   07/06/2017 8.3 (L) 8.4 - 10.2 mg/dL Final     Total Protein   Date Value Ref Range Status   07/06/2017 6.7 6.0 - 8.4 g/dL Final     Albumin   Date Value Ref Range Status   07/06/2017 4.1 3.5 - 5.2 g/dL Final     Total Bilirubin   Date Value Ref Range Status   07/06/2017 0.6 0.2 - 1.3 mg/dL Final     Comment:     For infants and newborns, interpretation of results should be based  on gestational age, weight and in agreement with clinical  observations.  Premature Infant recommended reference ranges:  Up to 24 hours.............<8.0 mg/dL  Up to 48 hours............<12.0 mg/dL  3-5 days..................<15.0 mg/dL  6-29 days.................<15.0 mg/dL       Alkaline Phosphatase   Date Value Ref Range Status   07/06/2017 116 38 - 145 U/L Final     AST   Date Value Ref Range Status   07/06/2017 28 14 - 36 U/L Final     ALT    Date Value Ref Range Status   07/06/2017 25 10 - 44 U/L Final     Anion Gap   Date Value Ref Range Status   07/06/2017 10 8 - 16 mmol/L Final     eGFR if    Date Value Ref Range Status   07/06/2017 14 (A) >60 mL/min/1.73 m^2 Final     eGFR if non    Date Value Ref Range Status   07/06/2017 12 (A) >60 mL/min/1.73 m^2 Final     Comment:     Calculation used to obtain the estimated glomerular filtration  rate (eGFR) is the CKD-EPI equation. Since race is unknown   in our information system, the eGFR values for   -American and Non--American patients are given   for each creatinine result.         IMPRESSION:  1.  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  Status post cadaveric renal allograft.  3.  Status post cadaveric hepatic allograft.    PLAN:  1.  Hemoglobin stable (>10), Hold Rituxan  2.  Return to clinic in four weeks with interval CBC, CMP prior to evaluation.    Assessment/plan reviewed and approved by Dr. Keen.

## 2017-07-06 NOTE — TELEPHONE ENCOUNTER
[7/6/2017 9:43 AM] William Vázquez:   Ms. Silveira MR#[id number] - Hold Rituxan today; reschedule for possible infusion in 4 weeks

## 2017-07-14 RX ORDER — LEVOTHYROXINE SODIUM 75 UG/1
75 TABLET ORAL DAILY
Qty: 30 TABLET | Refills: 6 | Status: SHIPPED | OUTPATIENT
Start: 2017-07-14 | End: 2018-01-01 | Stop reason: SDUPTHER

## 2017-07-17 ENCOUNTER — OFFICE VISIT (OUTPATIENT)
Dept: TRANSPLANT | Facility: CLINIC | Age: 72
End: 2017-07-17
Payer: MEDICARE

## 2017-07-17 VITALS
HEART RATE: 58 BPM | OXYGEN SATURATION: 97 % | DIASTOLIC BLOOD PRESSURE: 55 MMHG | BODY MASS INDEX: 17.12 KG/M2 | TEMPERATURE: 98 F | SYSTOLIC BLOOD PRESSURE: 113 MMHG | RESPIRATION RATE: 17 BRPM | HEIGHT: 66 IN | WEIGHT: 106.5 LBS

## 2017-07-17 DIAGNOSIS — Z94.0 S/P KIDNEY TRANSPLANT: Primary | ICD-10-CM

## 2017-07-17 DIAGNOSIS — N18.6 ESRD (END STAGE RENAL DISEASE): ICD-10-CM

## 2017-07-17 DIAGNOSIS — Z29.89 PROPHYLACTIC IMMUNOTHERAPY: ICD-10-CM

## 2017-07-17 DIAGNOSIS — Z94.4 LIVER TRANSPLANTED: ICD-10-CM

## 2017-07-17 DIAGNOSIS — K74.3 PRIMARY BILIARY CHOLANGITIS: ICD-10-CM

## 2017-07-17 DIAGNOSIS — T86.11 ACUTE REJECTION OF KIDNEY TRANSPLANT: ICD-10-CM

## 2017-07-17 PROCEDURE — 99499 UNLISTED E&M SERVICE: CPT | Mod: S$PBB,,, | Performed by: INTERNAL MEDICINE

## 2017-07-17 PROCEDURE — 1126F AMNT PAIN NOTED NONE PRSNT: CPT | Mod: S$GLB,,, | Performed by: INTERNAL MEDICINE

## 2017-07-17 PROCEDURE — 99999 PR PBB SHADOW E&M-EST. PATIENT-LVL III: CPT | Mod: PBBFAC,,, | Performed by: INTERNAL MEDICINE

## 2017-07-17 PROCEDURE — 1157F ADVNC CARE PLAN IN RCRD: CPT | Mod: S$GLB,,, | Performed by: INTERNAL MEDICINE

## 2017-07-17 PROCEDURE — 1159F MED LIST DOCD IN RCRD: CPT | Mod: S$GLB,,, | Performed by: INTERNAL MEDICINE

## 2017-07-17 PROCEDURE — 99214 OFFICE O/P EST MOD 30 MIN: CPT | Mod: S$GLB,,, | Performed by: INTERNAL MEDICINE

## 2017-07-17 NOTE — LETTER
July 21, 2017        David Woods  [address] [place] [address]  Phone: [phone]  Fax: [phone]             [facility]  [address]  [facility] [address]  Phone: [phone]   Patient: Dacia Hamilton   MR Number: [id number]   YOB: 1945   Date of Visit: 7/17/2017       Dear Dr. David Woods    Thank you for referring Dacia Hamilton to me for evaluation. Attached you will find relevant portions of my assessment and plan of care.    If you have questions, please do not hesitate to call me. I look forward to following Dacia Hamilton along with you.    Sincerely,    Victor Hugo Wise MD    Enclosure    If you would like to receive this communication electronically, please contact [email or web address] or [phone] to request [employer] access.    [employer] is a tool which provides read-only access to select patient information with whom you have a relationship. Its easy to use and provides real time access to review your patients record including encounter summaries, notes, results, and demographic information.    If you feel you have received this communication in error or would no longer like to receive these types of communications, please e-mail [email or web address]

## 2017-07-17 NOTE — PROGRESS NOTES
Subjective:       Patient ID: Dacia Hamilton is a 71 y.o. female.    Chief Complaint: Liver Transplant Follow-up    HPI  I saw this 71 y.o. lady who had 2 liver transplants in [place] in 1991. 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     She has proteinuria and sees Dr Godfrey as her nephrologist, and Dr Kumar as her PCP.  - now on dialysis and feels better.  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    Normal LFTs.      Review of Systems   Constitutional: Negative for activity change, appetite change, chills, fatigue, fever and unexpected weight change.   HENT: Negative for ear pain, hearing loss, nosebleeds, sore throat and trouble swallowing.    Eyes: Negative for redness and visual disturbance.   Respiratory: Negative for cough, chest tightness, shortness of breath and wheezing.    Cardiovascular: Negative for chest pain and palpitations.   Gastrointestinal: Negative for abdominal distention, abdominal pain, blood in stool, constipation, diarrhea, nausea and vomiting.   Genitourinary: Negative for difficulty urinating, dysuria, frequency, hematuria and urgency.   Musculoskeletal: Negative for arthralgias, back pain, gait problem, joint swelling and myalgias.   Skin: Negative for rash.   Neurological: Negative for tremors, seizures, speech difficulty, weakness and headaches.   Hematological: Negative for adenopathy.   Psychiatric/Behavioral: Negative for confusion, decreased concentration and sleep disturbance. The patient is not nervous/anxious.            Lab Results   Component Value Date    ALT 25 07/06/2017    AST 28 07/06/2017    GGT 25 09/02/2014    ALKPHOS 116 07/06/2017    BILITOT 0.6 07/06/2017     Past Medical History:    Diagnosis Date    Anemia     BK viruria 2011    Bursitis of left hip     Cataract     CHF (congestive heart failure)     Chronic kidney disease     Disorder of kidney and ureter     Encounter for blood transfusion     GERD (gastroesophageal reflux disease)     takes prilosec    Hyperlipidemia     Hypertension     Hypothyroidism     Influenza 2017    Osteoarthritis     Osteoporosis     Peptic ulcer disease     Primary biliary cirrhosis , ,     3 Liver Transplants (first 2 in same mo)    Sciatica     Urinary tract infection      Past Surgical History:   Procedure Laterality Date    2 previous liver transplant  1991-     at [facility]    APPENDECTOMY      AV FISTULA PLACEMENT Left 2017    CATARACT EXTRACTION BILATERAL W/ ANTERIOR VITRECTOMY       SECTION, CLASSIC      CHOLECYSTECTOMY      COLONOSCOPY  2014    Dr. Sánchez; normal findings per patient report    combined liver-kidney transplant  2006    Primary Biliary Cirrhosis    CYSTOSCOPY      EYE SURGERY      hepaticojejunostomy (pretty en Y)  10/2006    KIDNEY TRANSPLANT      25% of Both Kidneys/Third Kidney    LIVER TRANSPLANT      #3 liver transplants     TONSILLECTOMY      TUBAL LIGATION      UPPER GASTROINTESTINAL ENDOSCOPY  2017    Dr. Gibbons, repeat in 2-3 months     Current Outpatient Prescriptions   Medication Sig    alprazolam (XANAX) 0.25 MG tablet One po prn anxiety not to exceed one per day (Patient taking differently: Take 0.25 mg by mouth daily as needed. )    B complex-vitamin C-folic acid (FOLBEE PLUS) 5 mg Tab Take 1 tablet by mouth every evening.    benzonatate (TESSALON) 100 MG capsule Take 100 mg by mouth 3 (three) times daily as needed for Cough.    CALCIUM CARBONATE (CALCIUM 500 ORAL) Take 2 tablets by mouth Daily.    cholestyramine (QUESTRAN) 4 gram packet Take 4 g by mouth daily as needed.    cloNIDine 0.1 mg/24 hr td ptwk (CATAPRES) 0.1  mg/24 hr Place 1 patch onto the skin every 7 days. Every Wednesday    clopidogrel (PLAVIX) 75 mg tablet Take 75 mg by mouth once daily.    fish oil-omega-3 fatty acids 300-1,000 mg capsule Take 2 g by mouth once daily.      folic acid (FOLVITE) 400 MCG tablet Take 800 mcg by mouth once daily.    furosemide (LASIX) 80 MG tablet Take 1 tablet (80 mg total) by mouth once daily.    levothyroxine (SYNTHROID) 75 MCG tablet TAKE 1 TABLET (75 MCG TOTAL) BY MOUTH ONCE DAILY.    lidocaine-prilocaine (EMLA) cream APPLY TO AFFECTED AREA (FISTULA) 1 HOUR PRIOR TO HD    losartan (COZAAR) 50 MG tablet One po at noon (Patient taking differently: Take 50 mg by mouth once daily. One po at noon)    metoprolol tartrate (LOPRESSOR) 50 MG tablet Take 1 tablet (50 mg total) by mouth 2 (two) times daily. (Patient taking differently: Take 25 mg by mouth 2 (two) times daily. )    pantoprazole (PROTONIX) 40 MG tablet Take 1 tablet (40 mg total) by mouth before breakfast. (Patient taking differently: Take 40 mg by mouth once daily. )    phenyleph-min oil-petrolatum (PREPARATION H) 0.25-14-74.9 % Oint Place 1 applicator rectally 4 (four) times daily as needed.    promethazine (PHENERGAN) 12.5 MG Tab Take 1 tablet (12.5 mg total) by mouth every 6 (six) hours as needed (nausea). May make you sleep    PROPYLENE GLYCOL/ (SYSTANE, PROPYLENE GLYCOL, OPHT) Apply 1 drop to eye once daily. 1 drop every day both eyes    RITUXIMAB (RITUXAN IV) Inject into the vein as needed. Administered at [facility]    sucralfate (CARAFATE) 1 gram tablet Take 1 g by mouth 4 (four) times daily.    tacrolimus (PROGRAF) 1 MG Cap Take 1 capsule (1 mg total) by mouth every 12 (twelve) hours.    zolpidem (AMBIEN) 5 MG Tab Take 1 tablet (5 mg total) by mouth nightly as needed (insomnia). At bedtime     No current facility-administered medications for this visit.        Objective:      Physical Exam   Constitutional: She appears well-nourished. No  distress.   HENT:   Head: Normocephalic.   Eyes: Pupils are equal, round, and reactive to light.   Neck: No JVD present. No tracheal deviation present. No thyromegaly present.   Cardiovascular: Normal rate, regular rhythm and normal heart sounds.    No murmur heard.  Pulmonary/Chest: Effort normal and breath sounds normal. No stridor.   Abdominal: Soft.   Lymphadenopathy:        Head (right side): No submental, no submandibular, no tonsillar, no preauricular, no posterior auricular and no occipital adenopathy present.        Head (left side): No submental, no submandibular, no tonsillar, no preauricular, no posterior auricular and no occipital adenopathy present.     She has no cervical adenopathy.     She has no axillary adenopathy.   Neurological: She is alert. She has normal strength. She is not disoriented. No cranial nerve deficit or sensory deficit.   Skin: Skin is intact. No cyanosis.       Assessment:       1. S/P kidney transplant    2. Prophylactic immunotherapy    3. Primary biliary cholangitis    4. Liver transplanted    5. 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 ESRD (end stage renal disease)        Plan:   No changes to immunosuppression.  Clinic in 1 year- video visit.    UNOS Patient Status  Functional Status: 100% - Normal, no complaints, no evidence of disease  Physical Capacity: No Limitations

## 2017-07-18 DIAGNOSIS — E83.42 HYPOMAGNESEMIA: ICD-10-CM

## 2017-07-18 RX ORDER — LANOLIN ALCOHOL/MO/W.PET/CERES
CREAM (GRAM) TOPICAL
Qty: 90 TABLET | Refills: 9 | OUTPATIENT
Start: 2017-07-18

## 2017-07-19 ENCOUNTER — TELEPHONE (OUTPATIENT)
Dept: TRANSPLANT | Facility: CLINIC | Age: 72
End: 2017-07-19

## 2017-07-19 NOTE — TELEPHONE ENCOUNTER
----- Message from Rm Sandoval MD sent at 7/18/2017  5:10 PM CDT -----  I received a magnesium refill which is not appropriate because I don't see a recent magnesium, patient has with LAURYN and very low GFR    Please make sure she is following with her nephrologist and will defer management of ckd and electrolytes problems to her local doctors including nephrologist  Rm

## 2017-07-27 ENCOUNTER — PATIENT OUTREACH (OUTPATIENT)
Dept: ADMINISTRATIVE | Facility: HOSPITAL | Age: 72
End: 2017-07-27

## 2017-07-27 NOTE — LETTER
July 27, 2017    Dacia Hamilton  [address] [place] [address]             [facility]  [address]  [facility] [address]  Phone: [phone] Dear Mrs. Hamilton:    [facility] is committed to your overall health.  To help you get the most out of each of your visits, we will review your information to make sure you are up to date on all of your recommended tests and/or procedures.      Dr. Dvaid Woods has found that you may be due for osteoporosis screening, mammogram, and a shingles immunization.     Medicare does not cover all immunizations to be given in the clinic.  Check your benefits to ensure that you do not need to receive your immunizations at the pharmacy.    If you have had any of the above done at another facility, please bring the records or information with you so that your record at [facility] will be complete.  If you would like to schedule any of these, please contact me.    If you are currently taking medication, please bring it with you to your appointment for review.    Also, if you have any type of Advanced Directives, please bring them with you to your office visit so we may scan them into your chart.    If you have any questions or concerns, please don't hesitate to call.    Thank you for letting us care for you,  Jory Grigsby LPN Clinical Care Coordinator  [facility]  [phone]

## 2017-08-03 ENCOUNTER — OFFICE VISIT (OUTPATIENT)
Dept: HEMATOLOGY/ONCOLOGY | Facility: CLINIC | Age: 72
End: 2017-08-03
Payer: MEDICARE

## 2017-08-03 ENCOUNTER — LAB VISIT (OUTPATIENT)
Dept: LAB | Facility: HOSPITAL | Age: 72
End: 2017-08-03
Attending: INTERNAL MEDICINE
Payer: MEDICARE

## 2017-08-03 VITALS
DIASTOLIC BLOOD PRESSURE: 56 MMHG | HEART RATE: 56 BPM | SYSTOLIC BLOOD PRESSURE: 99 MMHG | WEIGHT: 109.13 LBS | BODY MASS INDEX: 17.54 KG/M2 | HEIGHT: 66 IN | RESPIRATION RATE: 16 BRPM | TEMPERATURE: 98 F

## 2017-08-03 DIAGNOSIS — D58.9 HEREDITARY HEMOLYTIC ANEMIA: ICD-10-CM

## 2017-08-03 DIAGNOSIS — D59.9 ACQUIRED HEMOLYTIC ANEMIA: Primary | ICD-10-CM

## 2017-08-03 LAB
ALBUMIN SERPL BCP-MCNC: 4.2 G/DL
ALP SERPL-CCNC: 157 U/L
ALT SERPL W/O P-5'-P-CCNC: 25 U/L
ANION GAP SERPL CALC-SCNC: 13 MMOL/L
AST SERPL-CCNC: 22 U/L
BASOPHILS # BLD AUTO: 0.02 K/UL
BASOPHILS NFR BLD: 0.4 %
BILIRUB SERPL-MCNC: 0.8 MG/DL
BUN SERPL-MCNC: 31 MG/DL
CALCIUM SERPL-MCNC: 8.6 MG/DL
CHLORIDE SERPL-SCNC: 93 MMOL/L
CO2 SERPL-SCNC: 31 MMOL/L
CREAT SERPL-MCNC: 4.12 MG/DL
DIFFERENTIAL METHOD: ABNORMAL
EOSINOPHIL # BLD AUTO: 0.6 K/UL
EOSINOPHIL NFR BLD: 11.1 %
ERYTHROCYTE [DISTWIDTH] IN BLOOD BY AUTOMATED COUNT: 15.4 %
EST. GFR  (AFRICAN AMERICAN): 12 ML/MIN/1.73 M^2
EST. GFR  (NON AFRICAN AMERICAN): 10 ML/MIN/1.73 M^2
GLUCOSE SERPL-MCNC: 92 MG/DL
HCT VFR BLD AUTO: 37.4 %
HGB BLD-MCNC: 11.5 G/DL
LYMPHOCYTES # BLD AUTO: 1 K/UL
LYMPHOCYTES NFR BLD: 18.4 %
MCH RBC QN AUTO: 33.1 PG
MCHC RBC AUTO-ENTMCNC: 30.7 G/DL
MCV RBC AUTO: 108 FL
MONOCYTES # BLD AUTO: 1 K/UL
MONOCYTES NFR BLD: 16.8 %
NEUTROPHILS # BLD AUTO: 3 K/UL
NEUTROPHILS NFR BLD: 53.3 %
NRBC BLD-RTO: 0 /100 WBC
PLATELET # BLD AUTO: 201 K/UL
PMV BLD AUTO: 9.3 FL
POTASSIUM SERPL-SCNC: 4.6 MMOL/L
PROT SERPL-MCNC: 6.7 G/DL
RBC # BLD AUTO: 3.47 M/UL
SODIUM SERPL-SCNC: 137 MMOL/L
WBC # BLD AUTO: 5.66 K/UL

## 2017-08-03 PROCEDURE — 80053 COMPREHEN METABOLIC PANEL: CPT | Mod: PN

## 2017-08-03 PROCEDURE — 85025 COMPLETE CBC W/AUTO DIFF WBC: CPT

## 2017-08-03 PROCEDURE — 99499 UNLISTED E&M SERVICE: CPT | Mod: S$PBB,,, | Performed by: NURSE PRACTITIONER

## 2017-08-03 PROCEDURE — 85025 COMPLETE CBC W/AUTO DIFF WBC: CPT | Mod: PN

## 2017-08-03 PROCEDURE — 36415 COLL VENOUS BLD VENIPUNCTURE: CPT | Mod: PN

## 2017-08-03 PROCEDURE — 99213 OFFICE O/P EST LOW 20 MIN: CPT | Mod: S$GLB,,, | Performed by: NURSE PRACTITIONER

## 2017-08-03 PROCEDURE — 1159F MED LIST DOCD IN RCRD: CPT | Mod: S$GLB,,, | Performed by: NURSE PRACTITIONER

## 2017-08-03 PROCEDURE — 1126F AMNT PAIN NOTED NONE PRSNT: CPT | Mod: S$GLB,,, | Performed by: NURSE PRACTITIONER

## 2017-08-03 PROCEDURE — 1157F ADVNC CARE PLAN IN RCRD: CPT | Mod: S$GLB,,, | Performed by: NURSE PRACTITIONER

## 2017-08-03 PROCEDURE — 99999 PR PBB SHADOW E&M-EST. PATIENT-LVL IV: CPT | Mod: PBBFAC,,, | Performed by: NURSE PRACTITIONER

## 2017-08-03 PROCEDURE — 80053 COMPREHEN METABOLIC PANEL: CPT

## 2017-08-03 NOTE — PROGRESS NOTES
HISTORY OF PRESENT ILLNESS:  This is a 71-year-old white female known to Dr. Metz for a   distant history of cirrhosis/sclerosing cholangitis for which the patient underwent hepatic   transplantation.  Immunosuppressant medications are thought to have resulted in renal failure.    She has undergone renal transplant, but has been developing worsening graft failure.  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    She returns to clinic for evaluation.  She recently presented to the ED at [facility] for overt melena and   weakness (04/18/17).  Dr. Gibbons performed an EGD and a non-bleeding ulcer was found with a   visible vessel.  It was injected with epinephrine.  She reports no further episodes.  She states   she has been feeling well.  Dialysis continues on Monday, Wednesday & Friday of each week.    She denies any recent illness, bleeding, bruising, weakness, fatigue, etc.    No other pertinent findings on a 14-point review of systems.    PHYSICAL EXAMINATION:  GENERAL:  Well-developed, well nourished, elderly white female in no acute distress.  Alert & oriented x 3.  VITAL SIGNS:  Weight 109.1 pounds, stable.  Documented in EMR and reviewed.               HEENT:  Normocephalic, atraumatic.  Oral mucosa pink and moist.  Lips without   lesions.  Tongue midline.  Oropharynx clear.  Nonicteric sclerae.   NECK:  Supple, no adenopathy.  No carotid bruits, thyromegaly or thyroid nodule.  HEART:  Regular rate and rhythm without murmur, gallop or rub.                LUNGS:  Clear to auscultation bilaterally.  Normal respiratory effort.       ABDOMEN:  Soft, nontender, nondistended with positive normoactive bowel sounds,   no hepatosplenomegaly.    EXTREMITIES:  No cyanosis, clubbing or edema.  Distal pulses are intact. AV shunt to LFA  with positive thrill & bruit.      AXILLAE AND GROIN:  No palpable pathologic lymphadenopathy is appreciated.        SKIN:   Intact/turgor normal; gray.                                                  NEUROLOGIC:  Cranial nerves II-XII grossly intact.  Motor:  Good muscle bulk and   tone.  Strength/sensory 5/5 throughout.  Gait stable.    LABORATORY:    Lab Results   Component Value Date    WBC 5.66 08/03/2017    HGB 11.5 (L) 08/03/2017    HCT 37.4 08/03/2017     (H) 08/03/2017     08/03/2017     CMP  Sodium   Date Value Ref Range Status   08/03/2017 137 136 - 145 mmol/L Final     Potassium   Date Value Ref Range Status   08/03/2017 4.6 3.5 - 5.1 mmol/L Final     Chloride   Date Value Ref Range Status   08/03/2017 93 (L) 95 - 110 mmol/L Final     CO2   Date Value Ref Range Status   08/03/2017 31 22 - 31 mmol/L Final     Glucose   Date Value Ref Range Status   08/03/2017 92 70 - 110 mg/dL Final     Comment:     The ADA recommends the following guidelines for fasting glucose:  Normal:       less than 100 mg/dL  Prediabetes:  100 mg/dL to 125 mg/dL  Diabetes:     126 mg/dL or higher       BUN, Bld   Date Value Ref Range Status   08/03/2017 31 (H) 7 - 18 mg/dL Final     Creatinine   Date Value Ref Range Status   08/03/2017 4.12 (H) 0.50 - 1.40 mg/dL Final     Calcium   Date Value Ref Range Status   08/03/2017 8.6 8.4 - 10.2 mg/dL Final     Total Protein   Date Value Ref Range Status   08/03/2017 6.7 6.0 - 8.4 g/dL Final     Albumin   Date Value Ref Range Status   08/03/2017 4.2 3.5 - 5.2 g/dL Final     Total Bilirubin   Date Value Ref Range Status   08/03/2017 0.8 0.2 - 1.3 mg/dL Final     Alkaline Phosphatase   Date Value Ref Range Status   08/03/2017 157 (H) 38 - 145 U/L Final     AST   Date Value Ref Range Status   08/03/2017 22 14 - 36 U/L Final     ALT   Date Value Ref Range Status   08/03/2017 25 10 - 44 U/L Final     Anion Gap   Date Value Ref Range Status   08/03/2017 13 8 - 16 mmol/L Final     eGFR if    Date Value Ref Range Status   08/03/2017 12 (A) >60 mL/min/1.73 m^2 Final     eGFR if non African  American   Date Value Ref Range Status   08/03/2017 10 (A) >60 mL/min/1.73 m^2 Final     Comment:     Calculation used to obtain the estimated glomerular filtration  rate (eGFR) is the CKD-EPI equation. Since race is unknown   in our information system, the eGFR values for   -American and Non--American patients are given   for each creatinine result.         IMPRESSION:  1.  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  Status post cadaveric renal allograft.  3.  Status post cadaveric hepatic allograft.    PLAN:  1.  Hemoglobin stable (> 10), Hold Rituxan  2.  Return to clinic in four weeks with interval CBC, CMP prior to evaluation.    Assessment/plan reviewed and approved by Dr. Keen.

## 2017-08-10 ENCOUNTER — OFFICE VISIT (OUTPATIENT)
Dept: FAMILY MEDICINE | Facility: CLINIC | Age: 72
End: 2017-08-10
Payer: MEDICARE

## 2017-08-10 ENCOUNTER — TELEPHONE (OUTPATIENT)
Dept: FAMILY MEDICINE | Facility: CLINIC | Age: 72
End: 2017-08-10

## 2017-08-10 VITALS
TEMPERATURE: 98 F | DIASTOLIC BLOOD PRESSURE: 50 MMHG | SYSTOLIC BLOOD PRESSURE: 100 MMHG | WEIGHT: 112.63 LBS | BODY MASS INDEX: 18.1 KG/M2 | HEART RATE: 60 BPM | HEIGHT: 66 IN

## 2017-08-10 DIAGNOSIS — N18.5 CKD (CHRONIC KIDNEY DISEASE) STAGE 5, GFR LESS THAN 15 ML/MIN: ICD-10-CM

## 2017-08-10 DIAGNOSIS — D84.9 IMMUNOSUPPRESSED STATUS: Primary | ICD-10-CM

## 2017-08-10 DIAGNOSIS — Z78.0 POST-MENOPAUSE: ICD-10-CM

## 2017-08-10 DIAGNOSIS — N18.6 END STAGE RENAL DISEASE: ICD-10-CM

## 2017-08-10 DIAGNOSIS — I34.0 NON-RHEUMATIC MITRAL REGURGITATION: ICD-10-CM

## 2017-08-10 PROBLEM — K92.1 GASTROINTESTINAL HEMORRHAGE WITH MELENA: Status: RESOLVED | Noted: 2017-04-18 | Resolved: 2017-08-10

## 2017-08-10 PROBLEM — R05.3 CHRONIC COUGH: Status: RESOLVED | Noted: 2017-01-03 | Resolved: 2017-08-10

## 2017-08-10 PROBLEM — R79.89 ELEVATED TROPONIN: Status: RESOLVED | Noted: 2017-03-01 | Resolved: 2017-08-10

## 2017-08-10 PROBLEM — E87.70 HYPERVOLEMIA: Status: RESOLVED | Noted: 2017-03-02 | Resolved: 2017-08-10

## 2017-08-10 PROBLEM — I50.32 CHRONIC DIASTOLIC CONGESTIVE HEART FAILURE: Status: RESOLVED | Noted: 2017-01-03 | Resolved: 2017-08-10

## 2017-08-10 PROBLEM — I50.33 ACUTE ON CHRONIC DIASTOLIC HEART FAILURE: Status: RESOLVED | Noted: 2017-02-28 | Resolved: 2017-08-10

## 2017-08-10 PROBLEM — I31.39 PERICARDIAL EFFUSION: Status: RESOLVED | Noted: 2017-03-05 | Resolved: 2017-08-10

## 2017-08-10 PROBLEM — R06.09 DYSPNEA ON EXERTION: Status: RESOLVED | Noted: 2017-01-03 | Resolved: 2017-08-10

## 2017-08-10 PROCEDURE — 99214 OFFICE O/P EST MOD 30 MIN: CPT | Mod: S$GLB,,, | Performed by: FAMILY MEDICINE

## 2017-08-10 PROCEDURE — 99999 PR PBB SHADOW E&M-EST. PATIENT-LVL III: CPT | Mod: PBBFAC,,, | Performed by: FAMILY MEDICINE

## 2017-08-10 PROCEDURE — 99499 UNLISTED E&M SERVICE: CPT | Mod: S$GLB,,, | Performed by: FAMILY MEDICINE

## 2017-08-10 NOTE — Clinical Note
I stopped her metoprolol as her BP is still low and she had some lightheadedness.  I left the lasix alone for now but she does not seem to be fluid overloaded. Thanks

## 2017-08-10 NOTE — PROGRESS NOTES
"Subjective:       Patient ID: Dacia Hamilton is a 71 y.o. female.    Chief Complaint: Follow-up (6 mo f/u. Poss due for lab. Please review: Pt is taking Folbee Plus and Folic Acid 800mcg, she is wondering if she should be taking both.)    Annual exam and follow-up multiple medical problems.  She had problems with melena in April and it was attributed to gastric ulcer.  Follow-up in June showed healing of the gastric ulcer.  She is on chronic renal dialysis for renal failure since December 2016.  She tolerates her dialysis well.  She had uncontrolled hypertension in June but has been running low in July.  Clonidine was stopped last week.  Metoprolol was reduced to 25 mg twice a day.  She does have some weakness and lightheadedness.  She would like to have a handicapped tag because of recurring right hip bursitis.  She previously had congestive heart failure but that has resolved.  Her pleural effusion and pericardial effusions have resolved.  She has regular follow-up for her hemolytic anemia with heme onc.      Review of Systems   Constitutional: Positive for fatigue. Negative for fever and unexpected weight change.   HENT: Negative for congestion, hearing loss and rhinorrhea.    Eyes: Negative for photophobia and visual disturbance.   Respiratory: Negative for cough, shortness of breath and wheezing.    Cardiovascular: Negative for chest pain and palpitations.   Gastrointestinal: Negative for abdominal pain, blood in stool, constipation, diarrhea and nausea.   Genitourinary: Negative for difficulty urinating, dysuria, hematuria, urgency, vaginal bleeding and vaginal discharge.   Musculoskeletal: Positive for arthralgias. Negative for joint swelling.   Neurological: Negative for numbness and headaches.       Objective:     Blood pressure (!) 100/50, pulse 60, temperature 98 °F (36.7 °C), temperature source Oral, height 5' 5.5" (1.664 m), weight 51.1 kg (112 lb 10.5 oz).      Physical Exam   Constitutional: She is " oriented to person, place, and time. She appears well-developed and well-nourished. No distress.   HENT:   Right Ear: External ear normal.   Left Ear: External ear normal.   Mouth/Throat: No oropharyngeal exudate.   Eyes: Conjunctivae and EOM are normal. Pupils are equal, round, and reactive to light. No scleral icterus.   Neck: Normal range of motion. No thyromegaly present.   Cardiovascular: Normal rate and regular rhythm.    Murmur (grade 2 systolic murmur heard best at the left lower sternal border.  She also has a radiating murmur to her right carotid.) heard.  No JVD.  No edema.  No HJR.   Pulmonary/Chest: Effort normal. No respiratory distress. She has no wheezes. She has no rales. She exhibits no tenderness.   Abdominal: Soft. She exhibits no distension and no mass. There is no tenderness.   Musculoskeletal: She exhibits no edema.   Lymphadenopathy:     She has no cervical adenopathy.   Neurological: She is alert and oriented to person, place, and time.   Normal gait   Skin: No rash noted.   Psychiatric: She has a normal mood and affect.       Assessment:       1. Immunosuppressed status    2. Post-menopause    3. CKD (chronic kidney disease) stage 5, GFR less than 15 ml/min    4. End stage renal disease    5. Non-rheumatic mitral regurgitation        Plan:       Stop metoprolol.  I will send a note to Dr. Godfrey.  DELFINO ordered.    I updated her health maintenance.  Flu shot later this year.

## 2017-08-14 ENCOUNTER — HOSPITAL ENCOUNTER (OUTPATIENT)
Dept: RADIOLOGY | Facility: HOSPITAL | Age: 72
Discharge: HOME | End: 2017-08-14
Attending: FAMILY MEDICINE
Payer: MEDICARE

## 2017-08-14 DIAGNOSIS — Z78.0 POST-MENOPAUSE: ICD-10-CM

## 2017-08-14 PROCEDURE — 77080 DXA BONE DENSITY AXIAL: CPT | Mod: 26,,, | Performed by: RADIOLOGY

## 2017-08-14 PROCEDURE — 77080 DXA BONE DENSITY AXIAL: CPT | Mod: TC,PO

## 2017-08-17 ENCOUNTER — TELEPHONE (OUTPATIENT)
Dept: FAMILY MEDICINE | Facility: CLINIC | Age: 72
End: 2017-08-17

## 2017-08-17 DIAGNOSIS — M81.0 AGE-RELATED OSTEOPOROSIS WITHOUT CURRENT PATHOLOGICAL FRACTURE: ICD-10-CM

## 2017-08-18 ENCOUNTER — TELEPHONE (OUTPATIENT)
Dept: ADMINISTRATIVE | Facility: HOSPITAL | Age: 72
End: 2017-08-18

## 2017-08-23 RX ORDER — FOLIC ACID/VIT B COMPLEX AND C 5 MG
TABLET ORAL
Qty: 30 EACH | Refills: 6 | Status: SHIPPED | OUTPATIENT
Start: 2017-08-23

## 2017-08-25 DIAGNOSIS — M81.0 OSTEOPOROSIS, SENILE: ICD-10-CM

## 2017-08-25 NOTE — PROGRESS NOTES
Referral from SHERITA Murdock NP for Prolia.  Recent BMD dated 08/14/17 with Impression = Osteoporosis, with high risk for fracture.  Hx. GERD, on PPI, kidney & liver transplant, hemolytic anemia, CKD, anemia.  BMP, Vitamin D prior to appointment.  Orders placed for Prolia.

## 2017-08-30 ENCOUNTER — OFFICE VISIT (OUTPATIENT)
Dept: NEPHROLOGY | Facility: CLINIC | Age: 72
End: 2017-08-30
Payer: MEDICARE

## 2017-08-30 VITALS
HEIGHT: 66 IN | DIASTOLIC BLOOD PRESSURE: 60 MMHG | RESPIRATION RATE: 16 BRPM | HEART RATE: 100 BPM | SYSTOLIC BLOOD PRESSURE: 128 MMHG | BODY MASS INDEX: 17.61 KG/M2 | WEIGHT: 109.56 LBS

## 2017-08-30 DIAGNOSIS — N25.81 SECONDARY RENAL HYPERPARATHYROIDISM: ICD-10-CM

## 2017-08-30 DIAGNOSIS — D63.8 ANEMIA OF CHRONIC DISORDER: ICD-10-CM

## 2017-08-30 DIAGNOSIS — D50.9 IRON DEFICIENCY ANEMIA, UNSPECIFIED IRON DEFICIENCY ANEMIA TYPE: ICD-10-CM

## 2017-08-30 DIAGNOSIS — Z94.4 LIVER TRANSPLANTED: ICD-10-CM

## 2017-08-30 DIAGNOSIS — N18.6 ESRD (END STAGE RENAL DISEASE): Primary | ICD-10-CM

## 2017-08-30 DIAGNOSIS — I15.9 SECONDARY HYPERTENSION: ICD-10-CM

## 2017-08-30 PROCEDURE — 3008F BODY MASS INDEX DOCD: CPT | Mod: S$GLB,,, | Performed by: INTERNAL MEDICINE

## 2017-08-30 PROCEDURE — 1157F ADVNC CARE PLAN IN RCRD: CPT | Mod: S$GLB,,, | Performed by: INTERNAL MEDICINE

## 2017-08-30 PROCEDURE — 99214 OFFICE O/P EST MOD 30 MIN: CPT | Mod: S$GLB,,, | Performed by: INTERNAL MEDICINE

## 2017-08-30 PROCEDURE — 1159F MED LIST DOCD IN RCRD: CPT | Mod: S$GLB,,, | Performed by: INTERNAL MEDICINE

## 2017-08-30 PROCEDURE — 99999 PR PBB SHADOW E&M-EST. PATIENT-LVL III: CPT | Mod: PBBFAC,,, | Performed by: INTERNAL MEDICINE

## 2017-08-30 PROCEDURE — 99499 UNLISTED E&M SERVICE: CPT | Mod: S$GLB,,, | Performed by: INTERNAL MEDICINE

## 2017-08-30 PROCEDURE — 1126F AMNT PAIN NOTED NONE PRSNT: CPT | Mod: S$GLB,,, | Performed by: INTERNAL MEDICINE

## 2017-08-31 ENCOUNTER — OFFICE VISIT (OUTPATIENT)
Dept: HEMATOLOGY/ONCOLOGY | Facility: CLINIC | Age: 72
End: 2017-08-31
Payer: MEDICARE

## 2017-08-31 ENCOUNTER — LAB VISIT (OUTPATIENT)
Dept: LAB | Facility: HOSPITAL | Age: 72
End: 2017-08-31
Attending: INTERNAL MEDICINE
Payer: MEDICARE

## 2017-08-31 ENCOUNTER — DOCUMENTATION ONLY (OUTPATIENT)
Dept: INFUSION THERAPY | Facility: HOSPITAL | Age: 72
End: 2017-08-31

## 2017-08-31 ENCOUNTER — TELEPHONE (OUTPATIENT)
Dept: INFUSION THERAPY | Facility: HOSPITAL | Age: 72
End: 2017-08-31

## 2017-08-31 VITALS
DIASTOLIC BLOOD PRESSURE: 58 MMHG | BODY MASS INDEX: 18.1 KG/M2 | WEIGHT: 112.63 LBS | TEMPERATURE: 98 F | HEIGHT: 66 IN | SYSTOLIC BLOOD PRESSURE: 112 MMHG | RESPIRATION RATE: 16 BRPM | HEART RATE: 63 BPM

## 2017-08-31 DIAGNOSIS — N18.5 CKD (CHRONIC KIDNEY DISEASE) STAGE 5, GFR LESS THAN 15 ML/MIN: ICD-10-CM

## 2017-08-31 DIAGNOSIS — N18.6 ESRD (END STAGE RENAL DISEASE): ICD-10-CM

## 2017-08-31 DIAGNOSIS — D59.9 ACQUIRED HEMOLYTIC ANEMIA: ICD-10-CM

## 2017-08-31 DIAGNOSIS — M81.0 AGE-RELATED OSTEOPOROSIS WITHOUT CURRENT PATHOLOGICAL FRACTURE: ICD-10-CM

## 2017-08-31 LAB
ALBUMIN SERPL BCP-MCNC: 4.1 G/DL
ALP SERPL-CCNC: 171 U/L
ALT SERPL W/O P-5'-P-CCNC: 30 U/L
ANION GAP SERPL CALC-SCNC: 12 MMOL/L
AST SERPL-CCNC: 28 U/L
BASOPHILS # BLD AUTO: 0.05 K/UL
BASOPHILS NFR BLD: 0.8 %
BILIRUB SERPL-MCNC: 0.7 MG/DL
BUN SERPL-MCNC: 37 MG/DL
CALCIUM SERPL-MCNC: 8.5 MG/DL
CHLORIDE SERPL-SCNC: 95 MMOL/L
CO2 SERPL-SCNC: 31 MMOL/L
CREAT SERPL-MCNC: 4.86 MG/DL
DIFFERENTIAL METHOD: ABNORMAL
EOSINOPHIL # BLD AUTO: 1 K/UL
EOSINOPHIL NFR BLD: 15.5 %
ERYTHROCYTE [DISTWIDTH] IN BLOOD BY AUTOMATED COUNT: 14.6 %
EST. GFR  (AFRICAN AMERICAN): 10 ML/MIN/1.73 M^2
EST. GFR  (NON AFRICAN AMERICAN): 8 ML/MIN/1.73 M^2
GLUCOSE SERPL-MCNC: 75 MG/DL
HCT VFR BLD AUTO: 39.1 %
HGB BLD-MCNC: 12.3 G/DL
LYMPHOCYTES # BLD AUTO: 1.1 K/UL
LYMPHOCYTES NFR BLD: 18 %
MCH RBC QN AUTO: 33.9 PG
MCHC RBC AUTO-ENTMCNC: 31.5 G/DL
MCV RBC AUTO: 108 FL
MONOCYTES # BLD AUTO: 1 K/UL
MONOCYTES NFR BLD: 15.8 %
NEUTROPHILS # BLD AUTO: 3.1 K/UL
NEUTROPHILS NFR BLD: 49.9 %
NRBC BLD-RTO: 0 /100 WBC
PLATELET # BLD AUTO: 219 K/UL
PMV BLD AUTO: 9.3 FL
POTASSIUM SERPL-SCNC: 4 MMOL/L
PROT SERPL-MCNC: 6.8 G/DL
RBC # BLD AUTO: 3.63 M/UL
SODIUM SERPL-SCNC: 138 MMOL/L
WBC # BLD AUTO: 6.21 K/UL

## 2017-08-31 PROCEDURE — 99999 PR PBB SHADOW E&M-EST. PATIENT-LVL III: CPT | Mod: PBBFAC,,, | Performed by: INTERNAL MEDICINE

## 2017-08-31 PROCEDURE — 1157F ADVNC CARE PLAN IN RCRD: CPT | Mod: S$GLB,,, | Performed by: INTERNAL MEDICINE

## 2017-08-31 PROCEDURE — 1126F AMNT PAIN NOTED NONE PRSNT: CPT | Mod: S$GLB,,, | Performed by: INTERNAL MEDICINE

## 2017-08-31 PROCEDURE — 85025 COMPLETE CBC W/AUTO DIFF WBC: CPT | Mod: PN

## 2017-08-31 PROCEDURE — 1159F MED LIST DOCD IN RCRD: CPT | Mod: S$GLB,,, | Performed by: INTERNAL MEDICINE

## 2017-08-31 PROCEDURE — 36415 COLL VENOUS BLD VENIPUNCTURE: CPT | Mod: PN

## 2017-08-31 PROCEDURE — 99499 UNLISTED E&M SERVICE: CPT | Mod: S$GLB,,, | Performed by: INTERNAL MEDICINE

## 2017-08-31 PROCEDURE — 80053 COMPREHEN METABOLIC PANEL: CPT

## 2017-08-31 PROCEDURE — 80053 COMPREHEN METABOLIC PANEL: CPT | Mod: PN

## 2017-08-31 PROCEDURE — 99214 OFFICE O/P EST MOD 30 MIN: CPT | Mod: S$GLB,,, | Performed by: INTERNAL MEDICINE

## 2017-08-31 PROCEDURE — 85025 COMPLETE CBC W/AUTO DIFF WBC: CPT

## 2017-08-31 PROCEDURE — 3008F BODY MASS INDEX DOCD: CPT | Mod: S$GLB,,, | Performed by: INTERNAL MEDICINE

## 2017-08-31 NOTE — PROGRESS NOTES
Spoke with Dr. Metz likely judd of patient needing rituxan treatment in November is slim.  Will reauthorize in November if patient needs to proceed with treatment.

## 2017-08-31 NOTE — PROGRESS NOTES
Subjective:       Patient ID: Dacia Hamilton is a 71 y.o. female.    Chief Complaint: rituxan  HPI:   oN Rituxan maintenance for hemolytic anemia. wentr to ED dx with the Flu and on Tamiflu since last week.   Receiving dialysis , taken off myfortil for renal immunosuppression to help alleviate anemia some. Was delayed by 1 week due to flu, here for rx today  REVIEW OF SYSTEMS:     No issues on 14 point review today. Feeling greatAV access placed recently slightly painful with manipulation    PHYSICAL EXAM:     Vitals:    08/31/17 0915   BP: (!) 112/58   Pulse: 63   Resp: 16   Temp: 97.5 °F (36.4 °C)       GENERAL: Comfortable looking patient. Patient is in no distress.  Awake, alert and oriented to time, person and place.  No anxiety, or agitation.      HEENT: Normal conjunctivae and eyelids. WNL.  PERRLA 3 to 4 mm. No icterus, no pallor, no congestion, and no discharge noted.     NECK:  Supple. Trachea is central.  No crepitus.  No JVD or masses.    RESPIRATORY:  No intercostal retractions.  No dullness to percussion.  Chest is clear to auscultation.  No rales, rhonchi or wheezes.  No crepitus.  Good air entry bilaterally.    CARDIOVASCULAR:  S1 and S2 are normally heard without murmurs or gallops.  All peripheral pulses are present.    ABDOMEN:  Normal abdomen.  No hepatosplenomegaly.  No free fluid.  Bowel sounds are present.  No hernia noted. No masses.  No rebound or tenderness.  No guarding or rigidity.  Umbilicus is midline.    LYMPHATICS:  No axillary, cervical, supraclavicular, submental, or inguinal lymphadenopathy.    SKIN/MUSCULOSKELETAL:  There is no evidence of excoriation marks or ecchmosis.  No rashes.  No cyanosis.  No clubbing.  No joint or skeletal deformities noted.  Normal range of motion.    NEUROLOGIC:  Higher functions are appropriate.  No cranial nerve deficits.  Normal fadia.  Normal strength.  Motor and sensory functions are normal.  Deep tendon reflexes are normal.    GENITAL/RECTAL:   Exams are deferred.      Laboratory:     CBC:  Lab Results   Component Value Date    WBC 6.21 08/31/2017    RBC 3.63 (L) 08/31/2017    HGB 12.3 08/31/2017    HCT 39.1 08/31/2017     (H) 08/31/2017    MCH 33.9 (H) 08/31/2017    MCHC 31.5 (L) 08/31/2017    RDW 14.6 (H) 08/31/2017     08/31/2017    MPV 9.3 08/31/2017    GRAN 3.1 08/31/2017    GRAN 49.9 08/31/2017    LYMPH 1.1 08/31/2017    LYMPH 18.0 08/31/2017    MONO 1.0 08/31/2017    MONO 15.8 (H) 08/31/2017    EOS 1.0 (H) 08/31/2017    BASO 0.05 08/31/2017    EOSINOPHIL 15.5 (H) 08/31/2017    BASOPHIL 0.8 08/31/2017       BMP: BMP  Lab Results   Component Value Date     08/31/2017    K 4.0 08/31/2017    CL 95 08/31/2017    CO2 31 08/31/2017    BUN 37 (H) 08/31/2017    CREATININE 4.86 (H) 08/31/2017    CALCIUM 8.5 08/31/2017    ANIONGAP 12 08/31/2017    ESTGFRAFRICA 10 (A) 08/31/2017    EGFRNONAA 8 (A) 08/31/2017       LFT:   Lab Results   Component Value Date    ALT 30 08/31/2017    AST 28 08/31/2017    GGT 25 09/02/2014    ALKPHOS 171 (H) 08/31/2017    BILITOT 0.7 08/31/2017         Assessment/Plan:       1. Acute hemolysis    2. ESRD (end stage renal disease)    3. CKD (chronic kidney disease) stage 5, GFR less than 15 ml/min    4. Age-related osteoporosis without current pathological fracture        Good hgb , holding well, not on rituxan , no GI bleed   rtc 3 months with cbc, cmp  Osteoporosis; start prolia orders placed  Receiving epogen during dialysis   The hemolysis may have been related to immunosuppressive agents used for the kidney,    Cont with liver transplant clinic.  Cont dialysis

## 2017-09-08 ENCOUNTER — TELEPHONE (OUTPATIENT)
Dept: HEMATOLOGY/ONCOLOGY | Facility: CLINIC | Age: 72
End: 2017-09-08

## 2017-09-08 ENCOUNTER — TELEPHONE (OUTPATIENT)
Dept: INFUSION THERAPY | Facility: HOSPITAL | Age: 72
End: 2017-09-08

## 2017-09-11 ENCOUNTER — LAB VISIT (OUTPATIENT)
Dept: LAB | Facility: HOSPITAL | Age: 72
End: 2017-09-11
Attending: INTERNAL MEDICINE
Payer: MEDICARE

## 2017-09-11 DIAGNOSIS — Z94.4 LIVER TRANSPLANTED: ICD-10-CM

## 2017-09-11 LAB
ALBUMIN SERPL BCP-MCNC: 3.4 G/DL
ALP SERPL-CCNC: 226 U/L
ALT SERPL W/O P-5'-P-CCNC: 36 U/L
ANION GAP SERPL CALC-SCNC: 13 MMOL/L
AST SERPL-CCNC: 32 U/L
BASOPHILS # BLD AUTO: 0.03 K/UL
BASOPHILS NFR BLD: 0.5 %
BILIRUB SERPL-MCNC: 0.8 MG/DL
BUN SERPL-MCNC: 19 MG/DL
CALCIUM SERPL-MCNC: 8.1 MG/DL
CHLORIDE SERPL-SCNC: 90 MMOL/L
CO2 SERPL-SCNC: 33 MMOL/L
CREAT SERPL-MCNC: 3.4 MG/DL
DIFFERENTIAL METHOD: ABNORMAL
EOSINOPHIL # BLD AUTO: 0.8 K/UL
EOSINOPHIL NFR BLD: 13.1 %
ERYTHROCYTE [DISTWIDTH] IN BLOOD BY AUTOMATED COUNT: 14.8 %
EST. GFR  (AFRICAN AMERICAN): 14.7 ML/MIN/1.73 M^2
EST. GFR  (NON AFRICAN AMERICAN): 12.8 ML/MIN/1.73 M^2
GLUCOSE SERPL-MCNC: 83 MG/DL
HCT VFR BLD AUTO: 38.6 %
HGB BLD-MCNC: 12.6 G/DL
LYMPHOCYTES # BLD AUTO: 1.2 K/UL
LYMPHOCYTES NFR BLD: 17.9 %
MCH RBC QN AUTO: 32.1 PG
MCHC RBC AUTO-ENTMCNC: 32.6 G/DL
MCV RBC AUTO: 99 FL
MONOCYTES # BLD AUTO: 0.7 K/UL
MONOCYTES NFR BLD: 10.3 %
NEUTROPHILS # BLD AUTO: 3.7 K/UL
NEUTROPHILS NFR BLD: 58 %
PLATELET # BLD AUTO: 213 K/UL
PMV BLD AUTO: 9.3 FL
POTASSIUM SERPL-SCNC: 4.1 MMOL/L
PROT SERPL-MCNC: 6.7 G/DL
RBC # BLD AUTO: 3.92 M/UL
SODIUM SERPL-SCNC: 136 MMOL/L
WBC # BLD AUTO: 6.41 K/UL

## 2017-09-11 PROCEDURE — 80197 ASSAY OF TACROLIMUS: CPT

## 2017-09-11 PROCEDURE — 36415 COLL VENOUS BLD VENIPUNCTURE: CPT | Mod: PO

## 2017-09-11 PROCEDURE — 85025 COMPLETE CBC W/AUTO DIFF WBC: CPT

## 2017-09-11 PROCEDURE — 80053 COMPREHEN METABOLIC PANEL: CPT

## 2017-09-12 ENCOUNTER — INFUSION (OUTPATIENT)
Dept: INFUSION THERAPY | Facility: HOSPITAL | Age: 72
End: 2017-09-12
Attending: INTERNAL MEDICINE
Payer: MEDICARE

## 2017-09-12 VITALS
TEMPERATURE: 98 F | RESPIRATION RATE: 16 BRPM | HEART RATE: 67 BPM | BODY MASS INDEX: 19.42 KG/M2 | DIASTOLIC BLOOD PRESSURE: 52 MMHG | SYSTOLIC BLOOD PRESSURE: 104 MMHG | HEIGHT: 66 IN | WEIGHT: 120.81 LBS

## 2017-09-12 DIAGNOSIS — M81.0 OSTEOPOROSIS, SENILE: Primary | ICD-10-CM

## 2017-09-12 LAB — TACROLIMUS BLD-MCNC: 3.5 NG/ML

## 2017-09-12 PROCEDURE — 96372 THER/PROPH/DIAG INJ SC/IM: CPT | Mod: PN

## 2017-09-12 PROCEDURE — 63600175 PHARM REV CODE 636 W HCPCS: Mod: PN | Performed by: INTERNAL MEDICINE

## 2017-09-12 RX ADMIN — DENOSUMAB 60 MG: 60 INJECTION SUBCUTANEOUS at 02:09

## 2017-09-12 NOTE — PATIENT INSTRUCTIONS
Denosumab injection  What is this medicine?  DENOSUMAB (den oh rox mab) slows bone breakdown. Prolia is used to treat osteoporosis in women after menopause and in men. Xgeva is used to prevent bone fractures and other bone problems caused by cancer bone metastases. Xgeva is also used to treat giant cell tumor of the bone.  How should I use this medicine?  This medicine is for injection under the skin. It is given by a health care professional in a hospital or clinic setting.  If you are getting Prolia, a special MedGuide will be given to you by the pharmacist with each prescription and refill. Be sure to read this information carefully each time.  For Prolia, talk to your pediatrician regarding the use of this medicine in children. Special care may be needed. For Xgeva, talk to your pediatrician regarding the use of this medicine in children. While this drug may be prescribed for children as young as 13 years for selected conditions, precautions do apply.  What side effects may I notice from receiving this medicine?  Side effects that you should report to your doctor or health care professional as soon as possible:  · allergic reactions like skin rash, itching or hives, swelling of the face, lips, or tongue  · breathing problems  · chest pain  · fast, irregular heartbeat  · feeling faint or lightheaded, falls  · fever, chills, or any other sign of infection  · muscle spasms, tightening, or twitches  · numbness or tingling  · skin blisters or bumps, or is dry, peels, or red  · slow healing or unexplained pain in the mouth or jaw  · unusual bleeding or bruising  Side effects that usually do not require medical attention (Report these to your doctor or health care professional if they continue or are bothersome.):  · muscle pain  · stomach upset, gas  What may interact with this medicine?  Do not take this medicine with any of the following medications:  · other medicines containing denosumab  This medicine may also  interact with the following medications:  · medicines that suppress the immune system  · medicines that treat cancer  · steroid medicines like prednisone or cortisone  What if I miss a dose?  It is important not to miss your dose. Call your doctor or health care professional if you are unable to keep an appointment.  Where should I keep my medicine?  This medicine is only given in a clinic, doctor's office, or other health care setting and will not be stored at home.  What should I tell my health care provider before I take this medicine?  They need to know if you have any of these conditions:  · dental disease  · eczema  · infection or history of infections  · kidney disease or on dialysis  · low blood calcium or vitamin D  · malabsorption syndrome  · scheduled to have surgery or tooth extraction  · taking medicine that contains denosumab  · thyroid or parathyroid disease  · an unusual reaction to denosumab, other medicines, foods, dyes, or preservatives  · pregnant or trying to get pregnant  · breast-feeding  What should I watch for while using this medicine?  Visit your doctor or health care professional for regular checks on your progress. Your doctor or health care professional may order blood tests and other tests to see how you are doing.  Call your doctor or health care professional if you get a cold or other infection while receiving this medicine. Do not treat yourself. This medicine may decrease your body's ability to fight infection.  You should make sure you get enough calcium and vitamin D while you are taking this medicine, unless your doctor tells you not to. Discuss the foods you eat and the vitamins you take with your health care professional.  See your dentist regularly. Brush and floss your teeth as directed. Before you have any dental work done, tell your dentist you are receiving this medicine.  Do not become pregnant while taking this medicine or for 5 months after stopping it. Women should  inform their doctor if they wish to become pregnant or think they might be pregnant. There is a potential for serious side effects to an unborn child. Talk to your health care professional or pharmacist for more information.  NOTE:This sheet is a summary. It may not cover all possible information. If you have questions about this medicine, talk to your doctor, pharmacist, or health care provider. Copyright© 2017 [employer]        Preventing Falls: Are You At Risk of Falling?     Ask for help to reduce risk of falling in your home.     As you get older, you're not as steady on your feet as you once were. And you may have health problems you didn't have when you were younger. So, it's not surprising that older people are more likely to trip and fall. Falling can be very serious. It can change your overall health and quality of life. That's why it's important to be aware of your own risk of falling.  The dangers of falling  Falls are one of the main causes of injury in people over age 65. An older person who falls may take longer to get better than a younger person. And, after a fall, an older person is more likely to have problems that don't go away. So, preventing falls can help you avoid serious health problems.  Are you at risk of falling?  Answer these questions to rate your level of risk.  · Are you a woman?  · Have you fallen or stumbled in the last year?  · Are you over age 65?  · Are you ever dizzy or lightheaded with standing?  · Do you have a hard time getting in and out of the bathtub or on and off the toilet?  · Do you lean on objects to help you get around? Or do you use a cane or walker?  · Do you have vision or hearing problems? For example, do you need new glasses or hearing aids?  · Do you have 2 or more long-lasting (chronic) medical conditions?  · Do you take 3 or more medicines?  · Have you felt depressed recently?  · Have you had more trouble with your memory in recent months?  · Are there  "hazards in your home that might cause you to fall, such as loose rugs or poor lighting?  · Do you have a pet that jumps on you or might trip you?  · Have you stopped getting regular exercise?  · Do you have diabetes?   · Do you have a neurologic disease, such as Parkinson or Alzheimer disease?   · Do you drink alcohol?  · Do you wear athletic shoes or slippers, or go barefoot at home?  You can help prevent falls  If you answered "yes" to any of the above questions, you should take steps to reduce your risk of a fall. Monitoring health conditions and keeping walkways in your home free of clutter are just 2 ways. Changing is sometimes easier said than done. But keep in mind that even small changes can make you less likely to fall.  The fear of falling  It's normal to be scared of falling, especially if you've fallen before. But being afraid can actually make you more likely to fall. This is because:  · Fear might cause you to become less active. Being less active can lead to a loss of strength and balance.  · Fear can lead to isolation from others, depression, or the use of more medicines or alcohol. And all these things make falling even more likely.  To break the cycle, learn more about ways to avoid falling. As you take control, you may find yourself feeling less afraid.   Date Last Reviewed: 6/12/2015  © 3373-7594 [employer]. [address] [place] [address]. All rights reserved. This information is not intended as a substitute for professional medical care. Always follow your healthcare professional's instructions.        Exercises to Prevent Falls  Certain types of exercises may help make you less likely to fall. Try the ones below. Or do other exercises that your health care provider suggests. Depending on your health, you may need to start slowly. Don't let that stop you. Even small amounts of exercise can help you. Be sure to talk to your health care provider before starting any " "exercise program.       Improve balance  Many types of exercise can help improve balance. Ashish chi and yoga are good examples. Here's another one to try. You can do it anytime and almost anywhere.  · Stand next to a counter or solid support.  · Push yourself up onto your tiptoes.  · Hold for 5 seconds. If you start to lose your balance, hold on to the counter.  · Rest and repeat 5 times. Work up to holding for 20 to 30 seconds, if you can. Increase flexibility  Being more flexible makes it easier for you to move around safely. Try exercises like the seated hamstring stretch.  · Sit in a chair and put one foot on a stool.  · Straighten your leg and reach with both hands down either side of your leg. Reach as far down your leg as you can.  · Hold for about 20 seconds.  · Go back to the starting position. Then repeat 5 times. Switch legs. Build strength  "Resistance" exercises help build strength. You can do them without equipment. Or you can use weights, elastic bands, or special machines. One such exercise is called the biceps curl. You can hold a 1-pound weight or even a can of soup. Do this exercise at least 3 times a week. Strive for every day.  · Sit up straight in a chair.  · Keep your elbow close to your body and your wrist straight.  · Bend your arm, moving your hand up to your shoulder. Then slowly lower your arm.  · Repeat 5 times. Switch to the other arm.   Build your staying power  Aerobic exercises make your heart and lungs stronger so you can keep moving longer. Walking and swimming are two of the best types of exercises you can do. Using a stationary bike is great, too. Find an aerobic exercise that you enjoy. Start slowly and build up. Even 5 minutes is helpful. Aim for a goal of 30 minutes, at least 3 times a week. You don't have to do 30 minutes in 1 session. Break it up and walk a little throughout the day.  More helpful tips  · Start easy. Slowly work up to doing more.  · Talk with your health " care provider about the best exercises for you.  · Call senior centers or health clubs about exercise programs.  · If needed, have a family member watch you walk every so often to check your stability.  · Exercise with a friend. Choose an activity you both enjoy.  · Consider alesia chi or yoga to strengthen your balance.  · Try exercises that you can do anytime, anywhere. Here are 2 examples. Have someone with you when you first try these:  ¨ Practice walking by placing 1 foot right in front of the other.  ¨ Stand up and sit down 10 times. Repeat this throughout the day.   Date Last Reviewed: 6/13/2015  © 2635-8556 [employer]. [address] [place] [address], [place] [address]. All rights reserved. This information is not intended as a substitute for professional medical care. Always follow your healthcare professional's instructions.        Preventing Falls: How to Prepare and What to Do    Falling is not something you want to think about. But it can make a big difference to plan ahead. If you're prepared, you'll know how to get help. And you'll be less likely to panic if you fall. This means you'll be able to do what's needed to get help right away.  How to prepare  · Have someone check on you daily.  · Keep a list of emergency numbers near the phone.  · Always have a way to call for help. Keep a cell phone with you at all times. Or talk with your healthcare provider about how to set up a home monitoring service. This involves wearing a small device around your neck or wrist. If you fall, you can press the button on the device. This alerts emergency responders.  · Talk with your healthcare provider about an exercise program that's right for you. Regular exercise may reduce the risk of falling and the risk for injury related to a fall.  · It's important to have good lighting in your home. Avoid using throw rugs, because they can raise your risk of tripping and falling. Add grab bars in the bathroom to help  reduce the risk of falling. Small changes can make your home safer. Talk with your healthcare provider about making your home safer.  What to do if you fall  Above all, try to stay calm:  · If you start to fall, try to relax your body. This will reduce the impact of the fall.  · After you fall, press your monitor button, or phone for help.  · Don't rush to get up. First, make sure you're not hurt.  · Roll onto your side, then crawl to a chair. Pull yourself up onto the chair slowly.  · You should be checked if you struck your head, lost consciousness, were confused afterward, or have any other concerns for injury.  · Be sure to tell your doctor that you fell.  A note to family and friends  If you're with a loved one when he or she starts to fall, don't try to stop the fall. Ease the person to the floor carefully, so neither of you gets hurt. Don't leave the person alone. And don't try to move him or her. Put a pillow under his or her head. Check for injuries. If help is needed right away, be sure to call 911.   Date Last Reviewed: 6/13/2015  © 4108-4654 [employer]. [address] [place] [address], [place] [address]. All rights reserved. This information is not intended as a substitute for professional medical care. Always follow your healthcare professional's instructions.

## 2017-09-12 NOTE — PLAN OF CARE
Problem: Patient Care Overview  Goal: Plan of Care Review  Outcome: Ongoing (interventions implemented as appropriate)  Pt tolerated injection well without complaints. D/c to home with steady gait and VSS. Pt teaching done on Prolia. Handouts given. Pt and daughter verbalized understanding.

## 2017-09-13 ENCOUNTER — TELEPHONE (OUTPATIENT)
Dept: TRANSPLANT | Facility: CLINIC | Age: 72
End: 2017-09-13

## 2017-09-13 NOTE — TELEPHONE ENCOUNTER
Letter sent, labs stable and no medication changes are needed. Repeat labs due 12/11/17 per protocol.

## 2017-09-13 NOTE — LETTER
September 13, 2017    Dacia Hamilton  [address] [place] [address]          Dear Dacia Hamilton:  MRN: [id number]    Your lab results were stable.  There are no medicine changes.  Please have your labs drawn again on 12/11/17.      If you cannot have your labs drawn on the scheduled date, it is your responsibility to call the transplant department to reschedule.  To reschedule or make an appointment, please as to speak to or leave a message for my assistant, Kae Hart, at [phone].  When leaving a message for Hailey Pal, or myself, we ask that you leave a brief message regarding your request.    Sincerely,      Lorin Mejia, RN, BSN  Liver Transplant Coordinator  [facility]  [address] [place] [id number] [phone]

## 2017-09-17 PROBLEM — D84.9 IMMUNOCOMPROMISED STATE: Status: ACTIVE | Noted: 2017-09-17

## 2017-09-17 PROBLEM — E83.51 HYPOCALCEMIA: Status: ACTIVE | Noted: 2017-09-17

## 2017-09-17 PROBLEM — E87.1 HYPONATREMIA: Status: ACTIVE | Noted: 2017-09-17

## 2017-09-18 NOTE — PROGRESS NOTES
Subjective:       Patient ID: Dacia Hamilton is a 71 y.o. White female who presents for follow-up evaluation of Follow-up and Chronic Kidney Disease    HPI     82 y/o with history of ESRD. She reports she is doing well    Review of Systems   Constitutional: Negative for fatigue and fever.   Respiratory: Negative for shortness of breath.    Cardiovascular: Negative for chest pain and leg swelling.   Neurological: Negative for weakness.       Objective:      Physical Exam   Constitutional: She is oriented to person, place, and time.   Cardiovascular: Exam reveals no friction rub.    Pulmonary/Chest: She has no wheezes. She has no rales.   Abdominal: Soft.   Musculoskeletal: She exhibits no edema.   Neurological: She is alert and oriented to person, place, and time.   Vitals reviewed.      Assessment:       1. ESRD (end stage renal disease)    2. Secondary hypertension    3. Anemia of chronic disorder    4. Iron deficiency anemia, unspecified iron deficiency anemia type    5. Secondary renal hyperparathyroidism    6. Liver transplanted        Plan:             ESRD--continue HD MWF. No change to prescription    HTN--controlled    Anemia, multifactorial. Continue Procrit and IV iron prn    Mineral and Bone Disease--continue Zemplar. No phos binders needed    Nutrition--still with proteinuria and doubt albumin will reach goal unless she stops urinating. Continue losartan

## 2017-09-19 ENCOUNTER — TELEPHONE (OUTPATIENT)
Dept: FAMILY MEDICINE | Facility: CLINIC | Age: 72
End: 2017-09-19

## 2017-09-19 NOTE — TELEPHONE ENCOUNTER
----- Message from Tania Pascual sent at 9/19/2017  4:02 PM CDT -----  Contact: [facility]   Discharge today   Needs follow up  3 days  Call pt at home to schedule

## 2017-09-19 NOTE — LETTER
September 19, 2017    Dacia Hamilton  [address] [place] [address]             [facility]  [address]  [place] [address]  Phone: [phone]  Fax: [phone] Dear Mrs. Hamilton:    Hope you are doing much better after your hospital discharge, and that you are resting. We have you scheduled for an appointment on 9/26/2017 at 1:40 pm.    We can't wait to see your smiling face there.     If you have any questions or concerns, please don't hesitate to call.    Sincerely,        Nurys Atkins LPN

## 2017-09-26 ENCOUNTER — LAB VISIT (OUTPATIENT)
Dept: LAB | Facility: HOSPITAL | Age: 72
End: 2017-09-26
Attending: FAMILY MEDICINE
Payer: MEDICARE

## 2017-09-26 ENCOUNTER — OFFICE VISIT (OUTPATIENT)
Dept: FAMILY MEDICINE | Facility: CLINIC | Age: 72
End: 2017-09-26
Payer: MEDICARE

## 2017-09-26 ENCOUNTER — DOCUMENTATION ONLY (OUTPATIENT)
Dept: FAMILY MEDICINE | Facility: CLINIC | Age: 72
End: 2017-09-26

## 2017-09-26 VITALS
TEMPERATURE: 99 F | DIASTOLIC BLOOD PRESSURE: 60 MMHG | OXYGEN SATURATION: 96 % | BODY MASS INDEX: 19.56 KG/M2 | SYSTOLIC BLOOD PRESSURE: 120 MMHG | WEIGHT: 121.69 LBS | HEART RATE: 76 BPM | HEIGHT: 66 IN

## 2017-09-26 DIAGNOSIS — M81.0 OSTEOPOROSIS, SENILE: ICD-10-CM

## 2017-09-26 DIAGNOSIS — E83.51 HYPOCALCEMIA: Primary | ICD-10-CM

## 2017-09-26 DIAGNOSIS — N18.6 END STAGE RENAL DISEASE: ICD-10-CM

## 2017-09-26 DIAGNOSIS — E83.51 HYPOCALCEMIA: ICD-10-CM

## 2017-09-26 LAB
ALBUMIN SERPL BCP-MCNC: 3.1 G/DL
ANION GAP SERPL CALC-SCNC: 17 MMOL/L
BUN SERPL-MCNC: 32 MG/DL
CA-I BLDV-SCNC: 0.63 MMOL/L
CALCIUM SERPL-MCNC: 5.4 MG/DL
CHLORIDE SERPL-SCNC: 88 MMOL/L
CO2 SERPL-SCNC: 27 MMOL/L
CREAT SERPL-MCNC: 4.8 MG/DL
EST. GFR  (AFRICAN AMERICAN): 10 ML/MIN/1.73 M^2
EST. GFR  (NON AFRICAN AMERICAN): 8 ML/MIN/1.73 M^2
GLUCOSE SERPL-MCNC: 135 MG/DL
MAGNESIUM SERPL-MCNC: 1.6 MG/DL
PHOSPHATE SERPL-MCNC: 1.8 MG/DL
POTASSIUM SERPL-SCNC: 3.7 MMOL/L
SODIUM SERPL-SCNC: 132 MMOL/L

## 2017-09-26 PROCEDURE — 82330 ASSAY OF CALCIUM: CPT

## 2017-09-26 PROCEDURE — 83735 ASSAY OF MAGNESIUM: CPT | Mod: PO

## 2017-09-26 PROCEDURE — 36415 COLL VENOUS BLD VENIPUNCTURE: CPT | Mod: PO

## 2017-09-26 PROCEDURE — 80069 RENAL FUNCTION PANEL: CPT | Mod: PO

## 2017-09-26 PROCEDURE — 1159F MED LIST DOCD IN RCRD: CPT | Mod: S$GLB,,, | Performed by: NURSE PRACTITIONER

## 2017-09-26 PROCEDURE — 99214 OFFICE O/P EST MOD 30 MIN: CPT | Mod: S$GLB,,, | Performed by: NURSE PRACTITIONER

## 2017-09-26 PROCEDURE — 99999 PR PBB SHADOW E&M-EST. PATIENT-LVL IV: CPT | Mod: PBBFAC,,, | Performed by: NURSE PRACTITIONER

## 2017-09-26 PROCEDURE — 1126F AMNT PAIN NOTED NONE PRSNT: CPT | Mod: S$GLB,,, | Performed by: NURSE PRACTITIONER

## 2017-09-26 PROCEDURE — 1157F ADVNC CARE PLAN IN RCRD: CPT | Mod: S$GLB,,, | Performed by: NURSE PRACTITIONER

## 2017-09-26 PROCEDURE — 3008F BODY MASS INDEX DOCD: CPT | Mod: S$GLB,,, | Performed by: NURSE PRACTITIONER

## 2017-09-26 PROCEDURE — 99499 UNLISTED E&M SERVICE: CPT | Mod: S$GLB,,, | Performed by: NURSE PRACTITIONER

## 2017-09-26 NOTE — PROGRESS NOTES
Subjective:       Patient ID: Dacia Hamilton is a 71 y.o. female.    Chief Complaint: Hospital Follow Up (c/o numbness)    HPI     Patient presents to clinic for a hospital f/u.   She was admitted in [facility] after presenting to the ER with complaints of diffuse tingling and some cramping. She was started on Prolia 5 days prior to presenting to the ER for severe osteoporosis. She was found to have severe, acute hypocalcemia upon preliminary evaluation. She was treated with Calcium gluconate, calcium level stabilized, and sx started to improve. She reports that sx have started to worsen again today. She received HD yesterday, and is scheduled for HD tomorrow. She is unsure if labs will be obtained, however.     Review of Systems   Constitutional: Negative for chills and fever.   Respiratory: Negative for cough, shortness of breath and wheezing.    Cardiovascular: Negative for chest pain and palpitations.   Skin: Negative for rash and wound.   Neurological: Positive for numbness. Negative for dizziness, tremors, weakness and light-headedness.   Psychiatric/Behavioral: Negative for dysphoric mood and sleep disturbance. The patient is not nervous/anxious.        Objective:      Physical Exam   Constitutional: She is oriented to person, place, and time. She appears well-developed and well-nourished.   HENT:   Head: Normocephalic and atraumatic.   Cardiovascular: Normal rate and regular rhythm.    Murmur heard.  Pulmonary/Chest: Effort normal and breath sounds normal. No respiratory distress. She has no wheezes. She has no rales.   Musculoskeletal: Normal range of motion. She exhibits no edema, tenderness or deformity.   Neurological: She is alert and oriented to person, place, and time. She has normal strength. She displays abnormal reflex. No cranial nerve deficit. Coordination and gait normal.   Skin: Skin is warm and dry.   Psychiatric: She has a normal mood and affect. Her behavior is normal.   Nursing note and vitals  reviewed.      Assessment:       1. Hypocalcemia    2. End stage renal disease    3. Osteoporosis, senile        Plan:     Dacia was seen today for hospital follow up.    Diagnoses and all orders for this visit:    Hypocalcemia  -     Renal function panel; Future  -     Calcium, ionized; Future  -     Magnesium; Future  Oral calcium replacement as previously advised.   Update labs due to recurrence of sx.   Continue with nephrology f/u as scheduled.     End stage renal disease  -     Renal function panel; Future  -     Calcium, ionized; Future  -     Magnesium; Future  Update labs. Will forward note to Dr. Godfrey.   Continue with HD tomorrow as scheduled.     Osteoporosis, senile  -     Renal function panel; Future  -     Calcium, ionized; Future  -     Magnesium; Future  F/U with hematology.

## 2017-09-27 PROBLEM — E83.39 HYPOPHOSPHATEMIA: Status: ACTIVE | Noted: 2017-09-27

## 2017-09-27 NOTE — PROGRESS NOTES
Dr. Lemuel Hardin on call for [facility].    Received a call from the lab at [facility] in [place] as a stat ionized calcium of 0.63 returning back with repeat 0.63 also;  normal range 1.06-1.42 for the lab.  Patient was called and notified of very low ionized calcium levels; patient symptomatic with diffuse tingling and muscle skeletal cramping ALL over.. Clinic visit earlier today by Sunshine Murdock, nurse practitioner was noted.  The importance was stressed to the patient that she go to the emergency room for further management and treatment of hypocalcemia as soon as possible; potential of arrhythmia was discussed as well.  She understand the importance of needing to go to emergency room for further evaluation and treatment of her low calcium and would be leaving as soon as possible.  Previous ionized calcium on 9/19/17 was 0.85.    Case was subsequently discussed with [facility] emergency room physician; she was notified of patient's abnormal lab values; her diagnosis's were discussed as well as her coming into the emergency room as soon as possible for management and treatment of severe hypocalcemia.  Noted also was recent hospitalization at [facility] just a few days ago for hypocalcemia as well.

## 2017-09-28 DIAGNOSIS — N18.5 CHRONIC KIDNEY DISEASE, STAGE V: Primary | ICD-10-CM

## 2017-09-29 ENCOUNTER — DOCUMENTATION ONLY (OUTPATIENT)
Dept: NEPHROLOGY | Facility: CLINIC | Age: 72
End: 2017-09-29

## 2017-09-29 ENCOUNTER — LAB VISIT (OUTPATIENT)
Dept: LAB | Facility: HOSPITAL | Age: 72
End: 2017-09-29
Attending: INTERNAL MEDICINE
Payer: MEDICARE

## 2017-09-29 DIAGNOSIS — N18.5 CHRONIC KIDNEY DISEASE, STAGE V: ICD-10-CM

## 2017-09-29 LAB
ALBUMIN SERPL BCP-MCNC: 3.2 G/DL
ANION GAP SERPL CALC-SCNC: 15 MMOL/L
BUN SERPL-MCNC: 39 MG/DL
CALCIUM SERPL-MCNC: 6.2 MG/DL
CHLORIDE SERPL-SCNC: 97 MMOL/L
CO2 SERPL-SCNC: 19 MMOL/L
CREAT SERPL-MCNC: 5.4 MG/DL
EST. GFR  (AFRICAN AMERICAN): 8 ML/MIN/1.73 M^2
EST. GFR  (NON AFRICAN AMERICAN): 7 ML/MIN/1.73 M^2
GLUCOSE SERPL-MCNC: 105 MG/DL
PHOSPHATE SERPL-MCNC: 2.2 MG/DL
POTASSIUM SERPL-SCNC: 4.8 MMOL/L
SODIUM SERPL-SCNC: 131 MMOL/L

## 2017-09-29 PROCEDURE — 80069 RENAL FUNCTION PANEL: CPT | Mod: PO

## 2017-09-29 PROCEDURE — 36415 COLL VENOUS BLD VENIPUNCTURE: CPT | Mod: PO

## 2017-09-29 NOTE — PROGRESS NOTES
Critical value received at 1153 per Stephanie Rizzo, informed Dr. Rodney who acknowledged at 1155.  Documented in flowsheet.

## 2017-10-04 ENCOUNTER — TELEPHONE (OUTPATIENT)
Dept: INFUSION THERAPY | Facility: HOSPITAL | Age: 72
End: 2017-10-04

## 2017-10-04 NOTE — TELEPHONE ENCOUNTER
Called patient regarding her next prolia schedule. Patient states she is not allow to take it again due to she had to many side effects from the 1 st injection. Patient did go to er 5 days after 1 st injection. And it is now on her chart as she is allergic to prolia. Information was given to safia at md office.

## 2017-10-27 ENCOUNTER — OFFICE VISIT (OUTPATIENT)
Dept: FAMILY MEDICINE | Facility: CLINIC | Age: 72
End: 2017-10-27
Payer: MEDICARE

## 2017-10-27 VITALS
HEART RATE: 60 BPM | TEMPERATURE: 98 F | BODY MASS INDEX: 18.03 KG/M2 | HEIGHT: 66 IN | DIASTOLIC BLOOD PRESSURE: 62 MMHG | SYSTOLIC BLOOD PRESSURE: 124 MMHG | WEIGHT: 112.19 LBS

## 2017-10-27 DIAGNOSIS — I15.9 SECONDARY HYPERTENSION: ICD-10-CM

## 2017-10-27 DIAGNOSIS — E83.51 HYPOCALCEMIA: Primary | ICD-10-CM

## 2017-10-27 DIAGNOSIS — D64.9 SEVERE ANEMIA: ICD-10-CM

## 2017-10-27 DIAGNOSIS — N18.5 CKD (CHRONIC KIDNEY DISEASE) STAGE 5, GFR LESS THAN 15 ML/MIN: ICD-10-CM

## 2017-10-27 PROCEDURE — 99999 PR PBB SHADOW E&M-EST. PATIENT-LVL IV: CPT | Mod: PBBFAC,,, | Performed by: NURSE PRACTITIONER

## 2017-10-27 PROCEDURE — 99499 UNLISTED E&M SERVICE: CPT | Mod: S$GLB,,, | Performed by: NURSE PRACTITIONER

## 2017-10-27 PROCEDURE — 99214 OFFICE O/P EST MOD 30 MIN: CPT | Mod: S$GLB,,, | Performed by: NURSE PRACTITIONER

## 2017-10-27 RX ORDER — BENZONATATE 100 MG/1
100 CAPSULE ORAL 3 TIMES DAILY PRN
Qty: 30 CAPSULE | Refills: 0 | Status: SHIPPED | OUTPATIENT
Start: 2017-10-27 | End: 2017-11-06

## 2017-10-27 NOTE — PROGRESS NOTES
Subjective:       Patient ID: Dacia Hamilton is a 71 y.o. female.    Chief Complaint: Follow-up (hospital [facility]); Medication Refill (tessalon); and Hypertension    HPI     Patient presents to clinic for hospital F/U. She received medication Prolia for osteoporosis almost 2 months ago. She then developed severe hypoglycemia and was admitted to [facility] on 09/17/2017. She was discharged from [facility] on 09/20/2017; she was re-evaluated in clinic for persistent numbness, tingling on 09/26/2017 at which time the calcium was noted to be 4.9. Phosphorus also low as well. She was re-hospitalized for electrolyte replacement. She is feeling better today. She received HD earlier this morning. Will repeat HD on MWF.   HTN - her b/p medications were discontinued. Her b/p has been fluctuating as of late. She is scheduled for f/u with Dr. Rodney next week.   Cough - sx occur intermittently, relates to AR. Dry, non-productive. Denies fever, chills. She is scheduled for a pulmonology f/u next week.     Review of Systems   Constitutional: Negative for chills and fever.   Respiratory: Positive for cough. Negative for shortness of breath and wheezing.    Gastrointestinal: Negative for diarrhea, nausea and vomiting.   Musculoskeletal: Negative for arthralgias and myalgias.   Neurological: Negative for weakness and numbness.       Objective:      Physical Exam   Constitutional: She is oriented to person, place, and time. She appears well-developed and well-nourished.   HENT:   Head: Normocephalic and atraumatic.   Cardiovascular: Normal rate, regular rhythm and normal heart sounds.    No murmur heard.  Pulmonary/Chest: Effort normal and breath sounds normal. No respiratory distress. She has no wheezes. She has no rales.   Musculoskeletal: Normal range of motion. She exhibits no edema, tenderness or deformity.   Neurological: She is alert and oriented to person, place, and time. No cranial nerve deficit.   Skin: Skin is warm and dry.    Psychiatric: She has a normal mood and affect. Her behavior is normal.   Nursing note and vitals reviewed.      Assessment:       1. Hypocalcemia    2. Severe anemia    3. CKD (chronic kidney disease) stage 5, GFR less than 15 ml/min    4. Secondary hypertension        Plan:   Dacai was seen today for follow-up, medication refill and hypertension.    Diagnoses and all orders for this visit:    Hypocalcemia  Resolved. Continue with nephrology f/u.     Severe anemia  Stable. Continue current regimen.     CKD (chronic kidney disease) stage 5, GFR less than 15 ml/min  Routine nephrology f/u.   HD MFW as scheduled.     Secondary hypertension  F/U with nephrology.     Other orders  -     benzonatate (TESSALON) 100 MG capsule; Take 1 capsule (100 mg total) by mouth 3 (three) times daily as needed for Cough.

## 2017-11-30 ENCOUNTER — OFFICE VISIT (OUTPATIENT)
Dept: HEMATOLOGY/ONCOLOGY | Facility: CLINIC | Age: 72
End: 2017-11-30
Payer: MEDICARE

## 2017-11-30 ENCOUNTER — LAB VISIT (OUTPATIENT)
Dept: LAB | Facility: HOSPITAL | Age: 72
End: 2017-11-30
Attending: INTERNAL MEDICINE
Payer: MEDICARE

## 2017-11-30 VITALS
WEIGHT: 115.94 LBS | RESPIRATION RATE: 18 BRPM | TEMPERATURE: 98 F | HEART RATE: 69 BPM | BODY MASS INDEX: 18.63 KG/M2 | SYSTOLIC BLOOD PRESSURE: 124 MMHG | DIASTOLIC BLOOD PRESSURE: 58 MMHG | HEIGHT: 66 IN

## 2017-11-30 DIAGNOSIS — Z94.0 S/P KIDNEY TRANSPLANT: ICD-10-CM

## 2017-11-30 DIAGNOSIS — N18.6 ESRD (END STAGE RENAL DISEASE): ICD-10-CM

## 2017-11-30 DIAGNOSIS — N18.30 ANEMIA DUE TO STAGE 3 (MODERATE) CHRONIC RENAL FAILURE TREATED WITH ERYTHROPOIETIN: Primary | ICD-10-CM

## 2017-11-30 DIAGNOSIS — D63.1 ANEMIA DUE TO STAGE 3 (MODERATE) CHRONIC RENAL FAILURE TREATED WITH ERYTHROPOIETIN: Primary | ICD-10-CM

## 2017-11-30 DIAGNOSIS — D58.0 ANEMIA DUE TO HEREDITARY SPHEROCYTOSIS: ICD-10-CM

## 2017-11-30 DIAGNOSIS — N25.81 SECONDARY RENAL HYPERPARATHYROIDISM: ICD-10-CM

## 2017-11-30 DIAGNOSIS — D63.8 ANEMIA OF CHRONIC DISORDER: ICD-10-CM

## 2017-11-30 DIAGNOSIS — N18.5 CKD (CHRONIC KIDNEY DISEASE) STAGE 5, GFR LESS THAN 15 ML/MIN: ICD-10-CM

## 2017-11-30 LAB
ALBUMIN SERPL BCP-MCNC: 4.3 G/DL
ALP SERPL-CCNC: 121 U/L
ALT SERPL W/O P-5'-P-CCNC: 34 U/L
ANION GAP SERPL CALC-SCNC: 15 MMOL/L
AST SERPL-CCNC: 30 U/L
BASOPHILS # BLD AUTO: 0.04 K/UL
BASOPHILS NFR BLD: 0.6 %
BILIRUB SERPL-MCNC: 0.8 MG/DL
BUN SERPL-MCNC: 36 MG/DL
CALCIUM SERPL-MCNC: 8.2 MG/DL
CHLORIDE SERPL-SCNC: 88 MMOL/L
CO2 SERPL-SCNC: 31 MMOL/L
CREAT SERPL-MCNC: 4.96 MG/DL
DIFFERENTIAL METHOD: ABNORMAL
EOSINOPHIL # BLD AUTO: 1.4 K/UL
EOSINOPHIL NFR BLD: 19.7 %
ERYTHROCYTE [DISTWIDTH] IN BLOOD BY AUTOMATED COUNT: 15.2 %
EST. GFR  (AFRICAN AMERICAN): 9 ML/MIN/1.73 M^2
EST. GFR  (NON AFRICAN AMERICAN): 8 ML/MIN/1.73 M^2
GLUCOSE SERPL-MCNC: 144 MG/DL
HCT VFR BLD AUTO: 38.5 %
HGB BLD-MCNC: 12.2 G/DL
LYMPHOCYTES # BLD AUTO: 1.3 K/UL
LYMPHOCYTES NFR BLD: 18.4 %
MCH RBC QN AUTO: 34.3 PG
MCHC RBC AUTO-ENTMCNC: 31.7 G/DL
MCV RBC AUTO: 108 FL
MONOCYTES # BLD AUTO: 0.7 K/UL
MONOCYTES NFR BLD: 9.9 %
NEUTROPHILS # BLD AUTO: 3.6 K/UL
NEUTROPHILS NFR BLD: 51.4 %
NRBC BLD-RTO: 0 /100 WBC
PLATELET # BLD AUTO: 199 K/UL
PMV BLD AUTO: 9.1 FL
POTASSIUM SERPL-SCNC: 3.8 MMOL/L
PROT SERPL-MCNC: 7.2 G/DL
RBC # BLD AUTO: 3.56 M/UL
SODIUM SERPL-SCNC: 134 MMOL/L
WBC # BLD AUTO: 7.06 K/UL

## 2017-11-30 PROCEDURE — 80053 COMPREHEN METABOLIC PANEL: CPT | Mod: PN

## 2017-11-30 PROCEDURE — 99999 PR PBB SHADOW E&M-EST. PATIENT-LVL III: CPT | Mod: PBBFAC,,, | Performed by: INTERNAL MEDICINE

## 2017-11-30 PROCEDURE — 85025 COMPLETE CBC W/AUTO DIFF WBC: CPT

## 2017-11-30 PROCEDURE — 36415 COLL VENOUS BLD VENIPUNCTURE: CPT | Mod: PN

## 2017-11-30 PROCEDURE — 99499 UNLISTED E&M SERVICE: CPT | Mod: S$GLB,,, | Performed by: INTERNAL MEDICINE

## 2017-11-30 PROCEDURE — 80053 COMPREHEN METABOLIC PANEL: CPT

## 2017-11-30 PROCEDURE — 85025 COMPLETE CBC W/AUTO DIFF WBC: CPT | Mod: PN

## 2017-11-30 PROCEDURE — 99214 OFFICE O/P EST MOD 30 MIN: CPT | Mod: S$GLB,,, | Performed by: INTERNAL MEDICINE

## 2017-11-30 NOTE — PROGRESS NOTES
Subjective:       Patient ID: Dacia Hamilton is a 71 y.o. female.    Chief Complaint:f/u of anemia  HPI:   Pt had hemolysis related to her immunosuppressives, prev transplants and finally pt decided to stay with dialysis, hemolysis resolved after rituxan and dc of immunosppressives    REVIEW OF SYSTEMS:     No issues on 14 point review today. Has settled well with 3/week dialysis  PHYSICAL EXAM:     Wt Readings from Last 3 Encounters:   11/30/17 52.6 kg (115 lb 15.4 oz)   10/27/17 50.9 kg (112 lb 3.4 oz)   09/26/17 55.4 kg (122 lb 2.2 oz)     Temp Readings from Last 3 Encounters:   11/30/17 97.5 °F (36.4 °C) (Oral)   10/27/17 98.3 °F (36.8 °C) (Oral)   09/27/17 98.3 °F (36.8 °C)     BP Readings from Last 3 Encounters:   11/30/17 (!) 124/58   10/27/17 124/62   09/27/17 (!) 170/82     Pulse Readings from Last 3 Encounters:   11/30/17 69   10/27/17 60   09/27/17 82     GENERAL: Comfortable looking patient. Patient is in no distress.  Awake, alert and oriented to time, person and place.  No anxiety, or agitation.      HEENT: Normal conjunctivae and eyelids. WNL.  PERRLA 3 to 4 mm. No icterus, no pallor, no congestion, and no discharge noted.     NECK:  Supple. Trachea is central.  No crepitus.  No JVD or masses.    RESPIRATORY:  No intercostal retractions.  No dullness to percussion.  Chest is clear to auscultation.  No rales, rhonchi or wheezes.  No crepitus.  Good air entry bilaterally.    CARDIOVASCULAR:  S1 and S2 are normally heard without murmurs or gallops.  All peripheral pulses are present.    ABDOMEN:  Normal abdomen.  No hepatosplenomegaly.  No free fluid.  Bowel sounds are present.  No hernia noted. No masses.  No rebound or tenderness.  No guarding or rigidity.  Umbilicus is midline.    LYMPHATICS:  No axillary, cervical, supraclavicular, submental, or inguinal lymphadenopathy.    SKIN/MUSCULOSKELETAL:  There is no evidence of excoriation marks or ecchmosis.  No rashes.  No cyanosis.  No clubbing.  No joint  or skeletal deformities noted.  Normal range of motion.    NEUROLOGIC:  Higher functions are appropriate.  No cranial nerve deficits.  Normal fadia.  Normal strength.  Motor and sensory functions are normal.  Deep tendon reflexes are normal.    GENITAL/RECTAL:  Exams are deferred.      Laboratory:     CBC:  Lab Results   Component Value Date    WBC 7.06 11/30/2017    RBC 3.56 (L) 11/30/2017    HGB 12.2 11/30/2017    HCT 38.5 11/30/2017     (H) 11/30/2017    MCH 34.3 (H) 11/30/2017    MCHC 31.7 (L) 11/30/2017    RDW 15.2 (H) 11/30/2017     11/30/2017    MPV 9.1 (L) 11/30/2017    GRAN 3.6 11/30/2017    GRAN 51.4 11/30/2017    LYMPH 1.3 11/30/2017    LYMPH 18.4 11/30/2017    MONO 0.7 11/30/2017    MONO 9.9 11/30/2017    EOS 1.4 (H) 11/30/2017    BASO 0.04 11/30/2017    EOSINOPHIL 19.7 (H) 11/30/2017    BASOPHIL 0.6 11/30/2017       BMP: BMP  Lab Results   Component Value Date     (L) 11/30/2017    K 3.8 11/30/2017    CL 88 (L) 11/30/2017    CO2 31 11/30/2017    BUN 36 (H) 11/30/2017    CREATININE 4.96 (H) 11/30/2017    CALCIUM 8.2 (L) 11/30/2017    ANIONGAP 15 11/30/2017    ESTGFRAFRICA 9 (A) 11/30/2017    EGFRNONAA 8 (A) 11/30/2017       LFT:   Lab Results   Component Value Date    ALT 34 11/30/2017    AST 30 11/30/2017    GGT 25 09/02/2014    ALKPHOS 121 11/30/2017    BILITOT 0.8 11/30/2017         Assessment/Plan:       Hemolysis resolved cont to monitor   rtc 4 months sooner if issues, cbc, cmp  Good hgb , holding well, not on rituxan , no GI bleed   Osteoporosis; start prolia orders placed  Receiving epogen during dialysis   The hemolysis may have been related to immunosuppressive agents used for the kidney,    Cont with liver transplant clinic.  Cont dialysis

## 2017-11-30 NOTE — PROGRESS NOTES
Patient Dacia Hamilton, MRN [id number], was dependent on dialysis (ICD10 Z99.2) at the time of this visit on 11/30/17. This addendum is made to the medical record on 11/30/2017.

## 2017-12-11 ENCOUNTER — LAB VISIT (OUTPATIENT)
Dept: LAB | Facility: HOSPITAL | Age: 72
End: 2017-12-11
Attending: INTERNAL MEDICINE
Payer: MEDICARE

## 2017-12-11 DIAGNOSIS — Z94.4 LIVER TRANSPLANTED: ICD-10-CM

## 2017-12-11 LAB
ALBUMIN SERPL BCP-MCNC: 3.9 G/DL
ALP SERPL-CCNC: 153 U/L
ALT SERPL W/O P-5'-P-CCNC: 34 U/L
ANION GAP SERPL CALC-SCNC: 11 MMOL/L
AST SERPL-CCNC: 39 U/L
BASOPHILS # BLD AUTO: 0.06 K/UL
BASOPHILS NFR BLD: 1 %
BILIRUB SERPL-MCNC: 1 MG/DL
BUN SERPL-MCNC: 17 MG/DL
CALCIUM SERPL-MCNC: 9.8 MG/DL
CHLORIDE SERPL-SCNC: 90 MMOL/L
CO2 SERPL-SCNC: 33 MMOL/L
CREAT SERPL-MCNC: 3.5 MG/DL
DIFFERENTIAL METHOD: ABNORMAL
EOSINOPHIL # BLD AUTO: 1.1 K/UL
EOSINOPHIL NFR BLD: 18.6 %
ERYTHROCYTE [DISTWIDTH] IN BLOOD BY AUTOMATED COUNT: 14.6 %
EST. GFR  (AFRICAN AMERICAN): 14.1 ML/MIN/1.73 M^2
EST. GFR  (NON AFRICAN AMERICAN): 12.2 ML/MIN/1.73 M^2
GLUCOSE SERPL-MCNC: 100 MG/DL
HCT VFR BLD AUTO: 40.5 %
HGB BLD-MCNC: 13.2 G/DL
IMM GRANULOCYTES # BLD AUTO: 0.01 K/UL
IMM GRANULOCYTES NFR BLD AUTO: 0.2 %
LYMPHOCYTES # BLD AUTO: 1 K/UL
LYMPHOCYTES NFR BLD: 17.4 %
MCH RBC QN AUTO: 34 PG
MCHC RBC AUTO-ENTMCNC: 32.6 G/DL
MCV RBC AUTO: 104 FL
MONOCYTES # BLD AUTO: 0.8 K/UL
MONOCYTES NFR BLD: 12.7 %
NEUTROPHILS # BLD AUTO: 3 K/UL
NEUTROPHILS NFR BLD: 50.1 %
NRBC BLD-RTO: 0 /100 WBC
PLATELET # BLD AUTO: 216 K/UL
PMV BLD AUTO: 9 FL
POTASSIUM SERPL-SCNC: 3.9 MMOL/L
PROT SERPL-MCNC: 7.6 G/DL
RBC # BLD AUTO: 3.88 M/UL
SODIUM SERPL-SCNC: 134 MMOL/L
WBC # BLD AUTO: 5.92 K/UL

## 2017-12-11 PROCEDURE — 85025 COMPLETE CBC W/AUTO DIFF WBC: CPT

## 2017-12-11 PROCEDURE — 36415 COLL VENOUS BLD VENIPUNCTURE: CPT | Mod: PO

## 2017-12-11 PROCEDURE — 80197 ASSAY OF TACROLIMUS: CPT

## 2017-12-11 PROCEDURE — 80053 COMPREHEN METABOLIC PANEL: CPT

## 2017-12-12 LAB — TACROLIMUS BLD-MCNC: 2.6 NG/ML

## 2017-12-13 ENCOUNTER — TELEPHONE (OUTPATIENT)
Dept: TRANSPLANT | Facility: CLINIC | Age: 72
End: 2017-12-13

## 2017-12-13 NOTE — LETTER
December 13, 2017    Dacia Hamilton  [address] [facility] [address]          Dear Dacia Hamilton:  MRN: [id number]    Your lab results were stable.  There are no medicine changes.  Please have your labs drawn again on 3/12/18.      If you cannot have your labs drawn on the scheduled date, it is your responsibility to call the transplant department to reschedule.  To reschedule or make an appointment, please as to speak to or leave a message for my assistant, aKe Hart, at [phone].  When leaving a message for Hailey Pal, or myself, we ask that you leave a brief message regarding your request.    Sincerely,    Lorin Mejia, RN, BSN  Liver Transplant Coordinator  [facility]  [address] [place] [id number] [phone]

## 2017-12-13 NOTE — TELEPHONE ENCOUNTER
Letter sent, labs stable and no medication changes are needed. Repeat labs due 3/12/18 per protocol.

## 2017-12-28 RX ORDER — CEPHALEXIN 250 MG/1
250 CAPSULE ORAL EVERY 8 HOURS
Qty: 21 CAPSULE | Refills: 0 | Status: SHIPPED | OUTPATIENT
Start: 2017-12-28 | End: 2018-05-31 | Stop reason: ALTCHOICE

## 2018-01-01 ENCOUNTER — TELEPHONE (OUTPATIENT)
Dept: FAMILY MEDICINE | Facility: CLINIC | Age: 73
End: 2018-01-01

## 2018-01-01 ENCOUNTER — OFFICE VISIT (OUTPATIENT)
Dept: FAMILY MEDICINE | Facility: CLINIC | Age: 73
End: 2018-01-01
Payer: MEDICARE

## 2018-01-01 ENCOUNTER — TELEPHONE (OUTPATIENT)
Dept: TRANSPLANT | Facility: CLINIC | Age: 73
End: 2018-01-01

## 2018-01-01 ENCOUNTER — LAB VISIT (OUTPATIENT)
Dept: LAB | Facility: HOSPITAL | Age: 73
End: 2018-01-01
Attending: FAMILY MEDICINE
Payer: MEDICARE

## 2018-01-01 ENCOUNTER — HOSPITAL ENCOUNTER (OUTPATIENT)
Dept: RADIOLOGY | Facility: HOSPITAL | Age: 73
Discharge: HOME | End: 2018-12-06
Attending: FAMILY MEDICINE
Payer: MEDICARE

## 2018-01-01 ENCOUNTER — LAB VISIT (OUTPATIENT)
Dept: LAB | Facility: HOSPITAL | Age: 73
End: 2018-01-01
Attending: INTERNAL MEDICINE
Payer: MEDICARE

## 2018-01-01 VITALS
DIASTOLIC BLOOD PRESSURE: 84 MMHG | HEIGHT: 66 IN | WEIGHT: 128.5 LBS | BODY MASS INDEX: 20.65 KG/M2 | SYSTOLIC BLOOD PRESSURE: 166 MMHG | RESPIRATION RATE: 16 BRPM | HEART RATE: 75 BPM

## 2018-01-01 VITALS
WEIGHT: 133.19 LBS | HEIGHT: 66 IN | SYSTOLIC BLOOD PRESSURE: 180 MMHG | BODY MASS INDEX: 21.4 KG/M2 | DIASTOLIC BLOOD PRESSURE: 76 MMHG | TEMPERATURE: 98 F | HEART RATE: 64 BPM

## 2018-01-01 DIAGNOSIS — I15.9 SECONDARY HYPERTENSION: ICD-10-CM

## 2018-01-01 DIAGNOSIS — M54.50 LOW BACK PAIN, NON-SPECIFIC: ICD-10-CM

## 2018-01-01 DIAGNOSIS — E03.4 HYPOTHYROIDISM DUE TO ACQUIRED ATROPHY OF THYROID: ICD-10-CM

## 2018-01-01 DIAGNOSIS — J06.9 UPPER RESPIRATORY TRACT INFECTION, UNSPECIFIED TYPE: ICD-10-CM

## 2018-01-01 DIAGNOSIS — N18.6 END STAGE RENAL DISEASE: ICD-10-CM

## 2018-01-01 DIAGNOSIS — R05.9 COUGH: ICD-10-CM

## 2018-01-01 DIAGNOSIS — I34.0 NON-RHEUMATIC MITRAL REGURGITATION: ICD-10-CM

## 2018-01-01 DIAGNOSIS — J90 RECURRENT RIGHT PLEURAL EFFUSION: ICD-10-CM

## 2018-01-01 DIAGNOSIS — I70.0 ARTERIOSCLEROSIS OF ABDOMINAL AORTA: ICD-10-CM

## 2018-01-01 DIAGNOSIS — I15.9 SECONDARY HYPERTENSION: Primary | ICD-10-CM

## 2018-01-01 DIAGNOSIS — Z94.4 LIVER TRANSPLANTED: ICD-10-CM

## 2018-01-01 DIAGNOSIS — R06.00 DYSPNEA, UNSPECIFIED TYPE: ICD-10-CM

## 2018-01-01 DIAGNOSIS — N18.5 CKD (CHRONIC KIDNEY DISEASE) STAGE 5, GFR LESS THAN 15 ML/MIN: ICD-10-CM

## 2018-01-01 LAB
ALBUMIN SERPL BCP-MCNC: 3.7 G/DL
ALP SERPL-CCNC: 125 U/L
ALT SERPL W/O P-5'-P-CCNC: 16 U/L
ANION GAP SERPL CALC-SCNC: 9 MMOL/L
AST SERPL-CCNC: 20 U/L
BASOPHILS # BLD AUTO: 0.03 K/UL
BASOPHILS NFR BLD: 0.5 %
BILIRUB SERPL-MCNC: 0.9 MG/DL
BUN SERPL-MCNC: 12 MG/DL
CALCIUM SERPL-MCNC: 10 MG/DL
CHLORIDE SERPL-SCNC: 95 MMOL/L
CHOLEST SERPL-MCNC: 144 MG/DL
CHOLEST/HDLC SERPL: 3.9 {RATIO}
CO2 SERPL-SCNC: 31 MMOL/L
CREAT SERPL-MCNC: 3.8 MG/DL
DIFFERENTIAL METHOD: ABNORMAL
EOSINOPHIL # BLD AUTO: 0.8 K/UL
EOSINOPHIL NFR BLD: 11.9 %
ERYTHROCYTE [DISTWIDTH] IN BLOOD BY AUTOMATED COUNT: 12.7 %
EST. GFR  (AFRICAN AMERICAN): 12.8 ML/MIN/1.73 M^2
EST. GFR  (NON AFRICAN AMERICAN): 11.1 ML/MIN/1.73 M^2
GLUCOSE SERPL-MCNC: 90 MG/DL
HCT VFR BLD AUTO: 39.7 %
HDLC SERPL-MCNC: 37 MG/DL
HDLC SERPL: 25.7 %
HGB BLD-MCNC: 12.7 G/DL
IMM GRANULOCYTES # BLD AUTO: 0 K/UL
IMM GRANULOCYTES NFR BLD AUTO: 0 %
LDLC SERPL CALC-MCNC: 85 MG/DL
LYMPHOCYTES # BLD AUTO: 1.6 K/UL
LYMPHOCYTES NFR BLD: 24 %
MCH RBC QN AUTO: 34.4 PG
MCHC RBC AUTO-ENTMCNC: 32 G/DL
MCV RBC AUTO: 108 FL
MONOCYTES # BLD AUTO: 0.8 K/UL
MONOCYTES NFR BLD: 11.5 %
NEUTROPHILS # BLD AUTO: 3.4 K/UL
NEUTROPHILS NFR BLD: 52.1 %
NONHDLC SERPL-MCNC: 107 MG/DL
NRBC BLD-RTO: 0 /100 WBC
PLATELET # BLD AUTO: 175 K/UL
PMV BLD AUTO: 10.2 FL
POTASSIUM SERPL-SCNC: 3.4 MMOL/L
PROT SERPL-MCNC: 7 G/DL
RBC # BLD AUTO: 3.69 M/UL
SODIUM SERPL-SCNC: 135 MMOL/L
TACROLIMUS BLD-MCNC: 9.1 NG/ML
TRIGL SERPL-MCNC: 110 MG/DL
TSH SERPL DL<=0.005 MIU/L-ACNC: 1.96 UIU/ML
WBC # BLD AUTO: 6.54 K/UL

## 2018-01-01 PROCEDURE — 1101F PT FALLS ASSESS-DOCD LE1/YR: CPT | Mod: CPTII,S$GLB,, | Performed by: FAMILY MEDICINE

## 2018-01-01 PROCEDURE — 80197 ASSAY OF TACROLIMUS: CPT

## 2018-01-01 PROCEDURE — 72100 X-RAY EXAM L-S SPINE 2/3 VWS: CPT | Mod: 26,,, | Performed by: RADIOLOGY

## 2018-01-01 PROCEDURE — 72100 X-RAY EXAM L-S SPINE 2/3 VWS: CPT | Mod: TC,PN

## 2018-01-01 PROCEDURE — 99999 PR PBB SHADOW E&M-EST. PATIENT-LVL III: CPT | Mod: PBBFAC,,, | Performed by: FAMILY MEDICINE

## 2018-01-01 PROCEDURE — 36415 COLL VENOUS BLD VENIPUNCTURE: CPT | Mod: PN

## 2018-01-01 PROCEDURE — 36415 COLL VENOUS BLD VENIPUNCTURE: CPT | Mod: PO

## 2018-01-01 PROCEDURE — 99214 OFFICE O/P EST MOD 30 MIN: CPT | Mod: S$GLB,,, | Performed by: FAMILY MEDICINE

## 2018-01-01 PROCEDURE — 84443 ASSAY THYROID STIM HORMONE: CPT

## 2018-01-01 PROCEDURE — 71046 X-RAY EXAM CHEST 2 VIEWS: CPT | Mod: 26,,, | Performed by: RADIOLOGY

## 2018-01-01 PROCEDURE — 71046 X-RAY EXAM CHEST 2 VIEWS: CPT | Mod: TC,PN

## 2018-01-01 PROCEDURE — 99999 PR PBB SHADOW E&M-EST. PATIENT-LVL IV: CPT | Mod: PBBFAC,,, | Performed by: FAMILY MEDICINE

## 2018-01-01 PROCEDURE — 80053 COMPREHEN METABOLIC PANEL: CPT

## 2018-01-01 PROCEDURE — 85025 COMPLETE CBC W/AUTO DIFF WBC: CPT

## 2018-01-01 PROCEDURE — 80061 LIPID PANEL: CPT

## 2018-01-01 RX ORDER — CODEINE PHOSPHATE AND GUAIFENESIN 10; 100 MG/5ML; MG/5ML
5 SOLUTION ORAL EVERY 8 HOURS PRN
Qty: 180 ML | Refills: 1 | Status: SHIPPED | OUTPATIENT
Start: 2018-01-01 | End: 2018-01-01

## 2018-01-01 RX ORDER — NIFEDIPINE 30 MG/1
30 TABLET, EXTENDED RELEASE ORAL DAILY
Qty: 30 TABLET | Refills: 11 | Status: SHIPPED | OUTPATIENT
Start: 2018-01-01 | End: 2019-01-01 | Stop reason: SDUPTHER

## 2018-01-01 RX ORDER — LEVOTHYROXINE SODIUM 75 UG/1
75 TABLET ORAL DAILY
Qty: 90 TABLET | Refills: 3 | Status: SHIPPED | OUTPATIENT
Start: 2018-01-01

## 2018-01-01 RX ORDER — BETAMETHASONE DIPROPIONATE 0.5 MG/G
LOTION TOPICAL DAILY
Qty: 60 ML | Refills: 5 | Status: SHIPPED | OUTPATIENT
Start: 2018-01-01 | End: 2019-01-01

## 2018-01-01 RX ORDER — AMLODIPINE BESYLATE 2.5 MG/1
2.5 TABLET ORAL DAILY
Qty: 90 TABLET | Refills: 3 | Status: SHIPPED | OUTPATIENT
Start: 2018-01-01 | End: 2018-01-01 | Stop reason: ALTCHOICE

## 2018-01-01 RX ORDER — ALPRAZOLAM 0.25 MG/1
TABLET ORAL
Qty: 30 TABLET | Refills: 3 | Status: SHIPPED | OUTPATIENT
Start: 2018-01-01 | End: 2019-01-01 | Stop reason: SDUPTHER

## 2018-01-25 ENCOUNTER — TELEPHONE (OUTPATIENT)
Dept: ADMINISTRATIVE | Facility: HOSPITAL | Age: 73
End: 2018-01-25

## 2018-01-25 NOTE — TELEPHONE ENCOUNTER
please remove the pharmacy currently listed, pt is [facility] [id number] per Rhiannon Prakash, PHN.    Done.

## 2018-01-31 ENCOUNTER — PATIENT MESSAGE (OUTPATIENT)
Dept: NEPHROLOGY | Facility: CLINIC | Age: 73
End: 2018-01-31

## 2018-02-05 ENCOUNTER — TELEPHONE (OUTPATIENT)
Dept: NEPHROLOGY | Facility: CLINIC | Age: 73
End: 2018-02-05

## 2018-02-07 ENCOUNTER — TELEPHONE (OUTPATIENT)
Dept: NEPHROLOGY | Facility: CLINIC | Age: 73
End: 2018-02-07

## 2018-02-07 DIAGNOSIS — N18.6 ESRD (END STAGE RENAL DISEASE): Primary | ICD-10-CM

## 2018-02-07 NOTE — TELEPHONE ENCOUNTER
Pt wishes for dialysis care to be transferred to [facility]. She is getting her HBV labs tomorrow in [place] around 10am. Thank you

## 2018-02-08 ENCOUNTER — LAB VISIT (OUTPATIENT)
Dept: LAB | Facility: HOSPITAL | Age: 73
End: 2018-02-08
Attending: INTERNAL MEDICINE
Payer: MEDICARE

## 2018-02-08 DIAGNOSIS — N18.6 ESRD (END STAGE RENAL DISEASE): ICD-10-CM

## 2018-02-08 PROCEDURE — 36415 COLL VENOUS BLD VENIPUNCTURE: CPT | Mod: PO

## 2018-02-08 PROCEDURE — 86704 HEP B CORE ANTIBODY TOTAL: CPT

## 2018-02-08 PROCEDURE — 86705 HEP B CORE ANTIBODY IGM: CPT

## 2018-02-08 PROCEDURE — 86706 HEP B SURFACE ANTIBODY: CPT

## 2018-02-08 PROCEDURE — 87340 HEPATITIS B SURFACE AG IA: CPT

## 2018-02-09 LAB
HBV CORE AB SERPL QL IA: NEGATIVE
HBV CORE IGM SERPL QL IA: NEGATIVE
HBV SURFACE AB SER-ACNC: POSITIVE M[IU]/ML
HBV SURFACE AG SERPL QL IA: NEGATIVE

## 2018-02-13 NOTE — TELEPHONE ENCOUNTER
I spoke with Chris at [facility] this am, who gave me a list of things he needed.      I called back this afternoon and spoke with another representative, who confirmed they received the pages I sent from [employer] this am.  I informed I will refax the Hepatitis labs from 2.8.18. Since they didn't seem to have received that fax.  Advised I do not have a copy of her ID card, but her information was sent on her latest demographic/reg sheet.  Advised the other information will have to be obtained from [employer].  The representative states they will reach out toe the current dialysis unit.

## 2018-02-14 ENCOUNTER — TELEPHONE (OUTPATIENT)
Dept: NEPHROLOGY | Facility: CLINIC | Age: 73
End: 2018-02-14

## 2018-02-14 DIAGNOSIS — R31.9 HEMATURIA SYNDROME: Primary | ICD-10-CM

## 2018-02-14 DIAGNOSIS — Z94.0 S/P KIDNEY TRANSPLANT: ICD-10-CM

## 2018-02-14 DIAGNOSIS — R39.15 URGENCY OF URINATION: ICD-10-CM

## 2018-02-14 RX ORDER — DOXYCYCLINE 100 MG/1
100 CAPSULE ORAL 2 TIMES DAILY WITH MEALS
Qty: 10 CAPSULE | Refills: 0 | Status: SHIPPED | OUTPATIENT
Start: 2018-02-14 | End: 2018-02-19

## 2018-02-14 NOTE — TELEPHONE ENCOUNTER
I have escribed Doxycycline to [facility] to take for 5 days. I am planning on seeing her at dialysis today at lunch but please call her to inform her in case I don't make it. Thank you

## 2018-02-14 NOTE — TELEPHONE ENCOUNTER
----- Message from Drew Handy sent at 2/14/2018  8:43 AM CST -----  Contact: self   Patient has a uti and is seeing some blood, please call back at [phone] (home) patient has to go dialysis at 10:45.

## 2018-02-14 NOTE — TELEPHONE ENCOUNTER
"Spoke with patient who is on her way to dialysis.  She was very resistant to leaving a culture.  She said she didn't know if she can give a specimen.  I explained the importance of urine culture to her and explained  it only takes a few drops for a culture and suggested she get a cup and take it with her so she can collect at her convenience.  She will bring it to the lab here.      She is very uncomfortable with frequency and urgency and asked if she can still having something if she doesn't do the culture.      Orders entered [facility] per Dr. Godfrey.  Entered a comment "If limited specimen, perform culture first."     Updated pharmacy and allergies and will forward to MD.   "

## 2018-02-25 PROBLEM — I10 HTN (HYPERTENSION): Status: ACTIVE | Noted: 2018-02-25

## 2018-03-12 ENCOUNTER — LAB VISIT (OUTPATIENT)
Dept: LAB | Facility: HOSPITAL | Age: 73
End: 2018-03-12
Attending: INTERNAL MEDICINE
Payer: MEDICARE

## 2018-03-12 DIAGNOSIS — Z94.4 LIVER TRANSPLANTED: ICD-10-CM

## 2018-03-12 LAB
ALBUMIN SERPL BCP-MCNC: 3.3 G/DL
ALP SERPL-CCNC: 112 U/L
ALT SERPL W/O P-5'-P-CCNC: 28 U/L
ANION GAP SERPL CALC-SCNC: 15 MMOL/L
AST SERPL-CCNC: 19 U/L
BASOPHILS # BLD AUTO: 0.03 K/UL
BASOPHILS NFR BLD: 0.3 %
BILIRUB SERPL-MCNC: 0.7 MG/DL
BUN SERPL-MCNC: 96 MG/DL
CALCIUM SERPL-MCNC: 7.9 MG/DL
CHLORIDE SERPL-SCNC: 95 MMOL/L
CO2 SERPL-SCNC: 24 MMOL/L
CREAT SERPL-MCNC: 7.3 MG/DL
DIFFERENTIAL METHOD: ABNORMAL
EOSINOPHIL # BLD AUTO: 0.5 K/UL
EOSINOPHIL NFR BLD: 5.2 %
ERYTHROCYTE [DISTWIDTH] IN BLOOD BY AUTOMATED COUNT: 13.7 %
EST. GFR  (AFRICAN AMERICAN): 5.8 ML/MIN/1.73 M^2
EST. GFR  (NON AFRICAN AMERICAN): 5 ML/MIN/1.73 M^2
GLUCOSE SERPL-MCNC: 101 MG/DL
HCT VFR BLD AUTO: 32.1 %
HGB BLD-MCNC: 10.3 G/DL
IMM GRANULOCYTES # BLD AUTO: 0.04 K/UL
IMM GRANULOCYTES NFR BLD AUTO: 0.4 %
LYMPHOCYTES # BLD AUTO: 0.8 K/UL
LYMPHOCYTES NFR BLD: 8.5 %
MCH RBC QN AUTO: 33.4 PG
MCHC RBC AUTO-ENTMCNC: 32.1 G/DL
MCV RBC AUTO: 104 FL
MONOCYTES # BLD AUTO: 1 K/UL
MONOCYTES NFR BLD: 9.8 %
NEUTROPHILS # BLD AUTO: 7.4 K/UL
NEUTROPHILS NFR BLD: 75.8 %
NRBC BLD-RTO: 0 /100 WBC
PLATELET # BLD AUTO: 193 K/UL
PMV BLD AUTO: 10.3 FL
POTASSIUM SERPL-SCNC: 5.3 MMOL/L
PROT SERPL-MCNC: 6.6 G/DL
RBC # BLD AUTO: 3.08 M/UL
SODIUM SERPL-SCNC: 134 MMOL/L
WBC # BLD AUTO: 9.75 K/UL

## 2018-03-12 PROCEDURE — 85025 COMPLETE CBC W/AUTO DIFF WBC: CPT

## 2018-03-12 PROCEDURE — 36415 COLL VENOUS BLD VENIPUNCTURE: CPT | Mod: PO

## 2018-03-12 PROCEDURE — 80053 COMPREHEN METABOLIC PANEL: CPT

## 2018-03-12 PROCEDURE — 80197 ASSAY OF TACROLIMUS: CPT

## 2018-03-13 ENCOUNTER — TELEPHONE (OUTPATIENT)
Dept: TRANSPLANT | Facility: CLINIC | Age: 73
End: 2018-03-13

## 2018-03-13 LAB — TACROLIMUS BLD-MCNC: 2.9 NG/ML

## 2018-03-13 NOTE — LETTER
March 13, 2018    Lisandra Hamilton  [address] [facility] [address]          Dear Lisandra Hamilton:  MRN: [id number]    Your lab results were stable.  There are no medicine changes.  Please have your labs drawn again on 6/11/18.      If you cannot have your labs drawn on the scheduled date, it is your responsibility to call the transplant department to reschedule.  To reschedule or make an appointment, please as to speak to or leave a message for my assistant, Kae Hart, at [phone].  When leaving a message for Hailey Pal, or myself, we ask that you leave a brief message regarding your request.    Sincerely,    Lorin Mejia, RN, BSN  Liver Transplant Coordinator  [facility]  [address] [place] [address] [phone]

## 2018-03-13 NOTE — TELEPHONE ENCOUNTER
Letter sent, labs stable and no medication changes are needed. Repeat labs due 6/11/18 per protocol.

## 2018-03-14 ENCOUNTER — TELEPHONE (OUTPATIENT)
Dept: CARDIOLOGY | Facility: CLINIC | Age: 73
End: 2018-03-14

## 2018-03-14 ENCOUNTER — LAB VISIT (OUTPATIENT)
Dept: LAB | Facility: HOSPITAL | Age: 73
End: 2018-03-14
Attending: INTERNAL MEDICINE
Payer: MEDICARE

## 2018-03-14 DIAGNOSIS — N39.0 UTI (URINARY TRACT INFECTION), UNCOMPLICATED: ICD-10-CM

## 2018-03-14 DIAGNOSIS — N39.0 UTI (URINARY TRACT INFECTION), UNCOMPLICATED: Primary | ICD-10-CM

## 2018-03-14 PROCEDURE — 87077 CULTURE AEROBIC IDENTIFY: CPT

## 2018-03-14 PROCEDURE — 87186 SC STD MICRODIL/AGAR DIL: CPT

## 2018-03-14 PROCEDURE — 87088 URINE BACTERIA CULTURE: CPT

## 2018-03-14 PROCEDURE — 87086 URINE CULTURE/COLONY COUNT: CPT

## 2018-03-14 NOTE — TELEPHONE ENCOUNTER
Pt ran early today at [facility] so I missed seeing her at lunch.     Please check to see how she is doing.  Does she need anything? Thank you

## 2018-03-14 NOTE — TELEPHONE ENCOUNTER
"She thinks the UTI is coming back because of frequency. Denies fever or back pain.  She states "If she wants to give me a prescription for a few more days, that might clear it up."  Willing to come today to lab for specimen.         "

## 2018-03-14 NOTE — TELEPHONE ENCOUNTER
Patient voiced understanding.  Leaving specimen today.   Will call if onset with pain or fever in the interim.

## 2018-03-14 NOTE — TELEPHONE ENCOUNTER
Since she may have return of possible UTI so soon I will need to check a urcx to see what bacteria and what ABX. Order in. Thank you

## 2018-03-17 LAB — BACTERIA UR CULT: NORMAL

## 2018-03-19 ENCOUNTER — TELEPHONE (OUTPATIENT)
Dept: NEPHROLOGY | Facility: CLINIC | Age: 73
End: 2018-03-19

## 2018-03-19 DIAGNOSIS — T86.12 FAILED KIDNEY TRANSPLANT: ICD-10-CM

## 2018-03-19 DIAGNOSIS — N18.6 ESRD (END STAGE RENAL DISEASE): ICD-10-CM

## 2018-03-19 DIAGNOSIS — N39.0 RECURRENT UTI: Primary | ICD-10-CM

## 2018-03-19 RX ORDER — CIPROFLOXACIN 250 MG/1
250 TABLET, FILM COATED ORAL 2 TIMES DAILY WITH MEALS
Qty: 20 TABLET | Refills: 0 | Status: SHIPPED | OUTPATIENT
Start: 2018-03-19 | End: 2018-03-29

## 2018-03-19 NOTE — TELEPHONE ENCOUNTER
Please inform pt that she does have a UTI, Klebsiella. I will send an ABX to [facility]. Since the UTI has reoccurred so quickly I will treat for 10 days and I would like for her to get a renal u/s of her native kidneys and transplant kidney to make sure there is no anatomical cause of UTIs, like a kidney stone.    Orders in. Thank you

## 2018-03-19 NOTE — TELEPHONE ENCOUNTER
Patient informed.      Scheduled ultrasound.     Spoke with [address] who is calling [facility] to acquire her prescription.

## 2018-03-26 ENCOUNTER — HOSPITAL ENCOUNTER (OUTPATIENT)
Dept: RADIOLOGY | Facility: HOSPITAL | Age: 73
Discharge: HOME | End: 2018-03-26
Attending: INTERNAL MEDICINE
Payer: MEDICARE

## 2018-03-26 ENCOUNTER — TELEPHONE (OUTPATIENT)
Dept: NEPHROLOGY | Facility: CLINIC | Age: 73
End: 2018-03-26

## 2018-03-26 DIAGNOSIS — N39.0 RECURRENT UTI: ICD-10-CM

## 2018-03-26 DIAGNOSIS — N18.6 ESRD (END STAGE RENAL DISEASE): ICD-10-CM

## 2018-03-26 DIAGNOSIS — T86.12 FAILED KIDNEY TRANSPLANT: ICD-10-CM

## 2018-03-26 PROCEDURE — 76770 US EXAM ABDO BACK WALL COMP: CPT | Mod: 26,,, | Performed by: RADIOLOGY

## 2018-03-26 PROCEDURE — 76776 US EXAM K TRANSPL W/DOPPLER: CPT | Mod: 26,,, | Performed by: RADIOLOGY

## 2018-03-26 PROCEDURE — 76770 US EXAM ABDO BACK WALL COMP: CPT | Mod: TC,PO

## 2018-03-26 PROCEDURE — 76776 US EXAM K TRANSPL W/DOPPLER: CPT | Mod: TC,PO

## 2018-03-26 NOTE — TELEPHONE ENCOUNTER
Please inform pt that her native kidneys and transplant kidney u/s do not reveal any anatomic abnormalities to explain her recurrent UTI    How is she feeling with symptoms?

## 2018-03-27 NOTE — TELEPHONE ENCOUNTER
"Patient voiced understanding.  She states she is feeling much better.  "every once in a while I have a strong urge to go, but not every time."  She reports have 1.5 days of medication left.   "

## 2018-03-29 ENCOUNTER — TELEPHONE (OUTPATIENT)
Dept: HEMATOLOGY/ONCOLOGY | Facility: CLINIC | Age: 73
End: 2018-03-29

## 2018-03-29 NOTE — TELEPHONE ENCOUNTER
Message left instructing patient to call [phone] or [phone] to r/s appointment on 4/5/18 with Dr. Metz.

## 2018-04-05 DIAGNOSIS — Z94.0 H/O KIDNEY TRANSPLANT: ICD-10-CM

## 2018-04-06 RX ORDER — TACROLIMUS 1 MG/1
1 CAPSULE ORAL EVERY 12 HOURS
Qty: 180 CAPSULE | Refills: 3 | Status: SHIPPED | OUTPATIENT
Start: 2018-04-06 | End: 2018-06-28 | Stop reason: DRUGHIGH

## 2018-04-12 ENCOUNTER — LAB VISIT (OUTPATIENT)
Dept: LAB | Facility: HOSPITAL | Age: 73
End: 2018-04-12
Attending: INTERNAL MEDICINE
Payer: MEDICARE

## 2018-04-12 ENCOUNTER — OFFICE VISIT (OUTPATIENT)
Dept: HEMATOLOGY/ONCOLOGY | Facility: CLINIC | Age: 73
End: 2018-04-12
Payer: MEDICARE

## 2018-04-12 VITALS
WEIGHT: 121.5 LBS | TEMPERATURE: 98 F | HEART RATE: 67 BPM | HEIGHT: 66 IN | RESPIRATION RATE: 16 BRPM | BODY MASS INDEX: 19.53 KG/M2 | DIASTOLIC BLOOD PRESSURE: 74 MMHG | SYSTOLIC BLOOD PRESSURE: 166 MMHG

## 2018-04-12 DIAGNOSIS — N18.5 ANEMIA DUE TO CHRONIC RENAL FAILURE TREATED WITH ERYTHROPOIETIN, STAGE 5: Primary | ICD-10-CM

## 2018-04-12 DIAGNOSIS — N18.5 CKD (CHRONIC KIDNEY DISEASE) STAGE 5, GFR LESS THAN 15 ML/MIN: ICD-10-CM

## 2018-04-12 DIAGNOSIS — M81.0 AGE-RELATED OSTEOPOROSIS WITHOUT CURRENT PATHOLOGICAL FRACTURE: ICD-10-CM

## 2018-04-12 DIAGNOSIS — E83.51 IATROGENIC HYPOCALCEMIA: ICD-10-CM

## 2018-04-12 DIAGNOSIS — N25.81 SECONDARY RENAL HYPERPARATHYROIDISM: ICD-10-CM

## 2018-04-12 DIAGNOSIS — D63.1 ANEMIA DUE TO CHRONIC RENAL FAILURE TREATED WITH ERYTHROPOIETIN, STAGE 5: Primary | ICD-10-CM

## 2018-04-12 DIAGNOSIS — N18.30 ANEMIA DUE TO STAGE 3 (MODERATE) CHRONIC RENAL FAILURE TREATED WITH ERYTHROPOIETIN: ICD-10-CM

## 2018-04-12 DIAGNOSIS — M81.0 OSTEOPOROSIS, SENILE: ICD-10-CM

## 2018-04-12 DIAGNOSIS — D64.9 SEVERE ANEMIA: ICD-10-CM

## 2018-04-12 DIAGNOSIS — D84.9 IMMUNOCOMPROMISED STATE: ICD-10-CM

## 2018-04-12 DIAGNOSIS — D63.1 ANEMIA DUE TO STAGE 3 (MODERATE) CHRONIC RENAL FAILURE TREATED WITH ERYTHROPOIETIN: ICD-10-CM

## 2018-04-12 LAB
ALBUMIN SERPL BCP-MCNC: 4.3 G/DL
ALP SERPL-CCNC: 136 U/L
ALT SERPL W/O P-5'-P-CCNC: 55 U/L
ANION GAP SERPL CALC-SCNC: 15 MMOL/L
AST SERPL-CCNC: 39 U/L
BASOPHILS # BLD AUTO: 0.04 K/UL
BASOPHILS NFR BLD: 0.5 %
BILIRUB SERPL-MCNC: 0.6 MG/DL
BUN SERPL-MCNC: 55 MG/DL
CALCIUM SERPL-MCNC: 9.9 MG/DL
CHLORIDE SERPL-SCNC: 90 MMOL/L
CO2 SERPL-SCNC: 34 MMOL/L
CREAT SERPL-MCNC: 5.67 MG/DL
DIFFERENTIAL METHOD: ABNORMAL
EOSINOPHIL # BLD AUTO: 0.8 K/UL
EOSINOPHIL NFR BLD: 10.3 %
ERYTHROCYTE [DISTWIDTH] IN BLOOD BY AUTOMATED COUNT: 14 %
EST. GFR  (AFRICAN AMERICAN): 8 ML/MIN/1.73 M^2
EST. GFR  (NON AFRICAN AMERICAN): 7 ML/MIN/1.73 M^2
GLUCOSE SERPL-MCNC: 112 MG/DL
HCT VFR BLD AUTO: 37.1 %
HGB BLD-MCNC: 11.8 G/DL
LYMPHOCYTES # BLD AUTO: 1.3 K/UL
LYMPHOCYTES NFR BLD: 16.7 %
MCH RBC QN AUTO: 35.1 PG
MCHC RBC AUTO-ENTMCNC: 31.8 G/DL
MCV RBC AUTO: 110 FL
MONOCYTES # BLD AUTO: 0.9 K/UL
MONOCYTES NFR BLD: 11.4 %
NEUTROPHILS # BLD AUTO: 4.8 K/UL
NEUTROPHILS NFR BLD: 61.1 %
NRBC BLD-RTO: 0 /100 WBC
PLATELET # BLD AUTO: 187 K/UL
PMV BLD AUTO: 9.3 FL
POTASSIUM SERPL-SCNC: 4.1 MMOL/L
PROT SERPL-MCNC: 7.1 G/DL
RBC # BLD AUTO: 3.36 M/UL
SODIUM SERPL-SCNC: 139 MMOL/L
WBC # BLD AUTO: 7.9 K/UL

## 2018-04-12 PROCEDURE — 36415 COLL VENOUS BLD VENIPUNCTURE: CPT | Mod: PN

## 2018-04-12 PROCEDURE — 99214 OFFICE O/P EST MOD 30 MIN: CPT | Mod: S$GLB,,, | Performed by: INTERNAL MEDICINE

## 2018-04-12 PROCEDURE — 99999 PR PBB SHADOW E&M-EST. PATIENT-LVL III: CPT | Mod: PBBFAC,,, | Performed by: INTERNAL MEDICINE

## 2018-04-12 PROCEDURE — 80053 COMPREHEN METABOLIC PANEL: CPT

## 2018-04-12 PROCEDURE — 85025 COMPLETE CBC W/AUTO DIFF WBC: CPT

## 2018-04-12 PROCEDURE — 99499 UNLISTED E&M SERVICE: CPT | Mod: S$GLB,,, | Performed by: INTERNAL MEDICINE

## 2018-04-12 PROCEDURE — 85025 COMPLETE CBC W/AUTO DIFF WBC: CPT | Mod: PN

## 2018-04-12 PROCEDURE — 80053 COMPREHEN METABOLIC PANEL: CPT | Mod: PN

## 2018-04-12 NOTE — PROGRESS NOTES
Patient Dacia Hamilton, MRN [id number], was dependent on dialysis (ICD10 Z99.2) at the time of this visit on 4/12/18. This addendum is made to the medical record on 04/12/2018.

## 2018-04-12 NOTE — PROGRESS NOTES
Subjective:       Patient ID: Dacia Hamilton is a 72 y.o. female.    Chief Complaint:f/u of anemia  HPI:   Pt had hemolysis related to her immunosuppressives, prev transplants and finally pt decided to stay with dialysis, hemolysis resolved after rituxan and dc of immunosppressives    REVIEW OF SYSTEMS:     No issues on 14 point review today. Has settled well with 3/week dialysis has been off prolia due to severe hypocalcemia,, has been in Pt due to break again of rt shoulder, she is only on vid and and calcium for osteoporoisis  PHYSICAL EXAM:     Wt Readings from Last 3 Encounters:   02/07/18 55.3 kg (121 lb 14.6 oz)   11/30/17 52.6 kg (115 lb 15.4 oz)   10/27/17 50.9 kg (112 lb 3.4 oz)     Temp Readings from Last 3 Encounters:   11/30/17 97.5 °F (36.4 °C) (Oral)   10/27/17 98.3 °F (36.8 °C) (Oral)   09/27/17 98.3 °F (36.8 °C)     BP Readings from Last 3 Encounters:   02/07/18 110/70   11/30/17 (!) 124/58   10/27/17 124/62     Pulse Readings from Last 3 Encounters:   02/07/18 64   11/30/17 69   10/27/17 60     GENERAL: Comfortable looking patient. Patient is in no distress.  Awake, alert and oriented to time, person and place.  No anxiety, or agitation.      HEENT: Normal conjunctivae and eyelids. WNL.  PERRLA 3 to 4 mm. No icterus, no pallor, no congestion, and no discharge noted.     NECK:  Supple. Trachea is central.  No crepitus.  No JVD or masses.    RESPIRATORY:  No intercostal retractions.  No dullness to percussion.  Chest is clear to auscultation.  No rales, rhonchi or wheezes.  No crepitus.  Good air entry bilaterally.    CARDIOVASCULAR:  S1 and S2 are normally heard without murmurs or gallops.  All peripheral pulses are present.    ABDOMEN:  Normal abdomen.  No hepatosplenomegaly.  No free fluid.  Bowel sounds are present.  No hernia noted. No masses.  No rebound or tenderness.  No guarding or rigidity.  Umbilicus is midline.    LYMPHATICS:  No axillary, cervical, supraclavicular, submental, or inguinal  lymphadenopathy.    SKIN/MUSCULOSKELETAL:  There is no evidence of excoriation marks or ecchmosis.  No rashes.  No cyanosis.  No clubbing.  No joint or skeletal deformities noted.  Normal range of motion.    NEUROLOGIC:  Higher functions are appropriate.  No cranial nerve deficits.  Normal fadia.  Normal strength.  Motor and sensory functions are normal.  Deep tendon reflexes are normal.    GENITAL/RECTAL:  Exams are deferred.      Laboratory:     CBC  Lab Results   Component Value Date    WBC 7.90 04/12/2018    HGB 11.8 (L) 04/12/2018    HCT 37.1 04/12/2018     (H) 04/12/2018     04/12/2018     CMP  Sodium   Date Value Ref Range Status   04/12/2018 139 136 - 145 mmol/L Final     Potassium   Date Value Ref Range Status   04/12/2018 4.1 3.5 - 5.1 mmol/L Final     Chloride   Date Value Ref Range Status   04/12/2018 90 (L) 95 - 110 mmol/L Final     CO2   Date Value Ref Range Status   04/12/2018 34 (H) 22 - 31 mmol/L Final     Glucose   Date Value Ref Range Status   04/12/2018 112 (H) 70 - 110 mg/dL Final     Comment:     The ADA recommends the following guidelines for fasting glucose:  Normal:       less than 100 mg/dL  Prediabetes:  100 mg/dL to 125 mg/dL  Diabetes:     126 mg/dL or higher       BUN, Bld   Date Value Ref Range Status   04/12/2018 55 (H) 7 - 18 mg/dL Final     Creatinine   Date Value Ref Range Status   04/12/2018 5.67 (H) 0.50 - 1.40 mg/dL Final     Calcium   Date Value Ref Range Status   04/12/2018 9.9 8.4 - 10.2 mg/dL Final     Total Protein   Date Value Ref Range Status   04/12/2018 7.1 6.0 - 8.4 g/dL Final     Albumin   Date Value Ref Range Status   04/12/2018 4.3 3.5 - 5.2 g/dL Final     Total Bilirubin   Date Value Ref Range Status   04/12/2018 0.6 0.2 - 1.3 mg/dL Final     Alkaline Phosphatase   Date Value Ref Range Status   04/12/2018 136 38 - 145 U/L Final     AST   Date Value Ref Range Status   04/12/2018 39 (H) 14 - 36 U/L Final     ALT   Date Value Ref Range Status   04/12/2018  55 (H) 10 - 44 U/L Final     Anion Gap   Date Value Ref Range Status   04/12/2018 15 8 - 16 mmol/L Final     eGFR if    Date Value Ref Range Status   04/12/2018 8 (A) >60 mL/min/1.73 m^2 Final     eGFR if non    Date Value Ref Range Status   04/12/2018 7 (A) >60 mL/min/1.73 m^2 Final     Comment:     Calculation used to obtain the estimated glomerular filtration  rate (eGFR) is the CKD-EPI equation.            Assessment/Plan:       Hemolysis resolved cont to monitor   rtc 4 months sooner if issues, cbc, cmp pt can call and cancel as they do check her labs very frequently at dialysis  Good hgb , holding well, not on rituxan , no GI bleed   Osteoporosis; no prolia due to hypocalcemia, had to get  Iv calcium etc  Receiving epogen during dialysis   The hemolysis may have been related to immunosuppressive agents used for the kidney,    Cont with liver transplant clinic.  Cont dialysis

## 2018-05-02 DIAGNOSIS — K21.9 GASTROESOPHAGEAL REFLUX DISEASE, ESOPHAGITIS PRESENCE NOT SPECIFIED: Primary | ICD-10-CM

## 2018-05-02 RX ORDER — PANTOPRAZOLE SODIUM 40 MG/1
40 TABLET, DELAYED RELEASE ORAL
Qty: 90 TABLET | Refills: 3 | Status: SHIPPED | OUTPATIENT
Start: 2018-05-02 | End: 2018-05-31

## 2018-05-31 ENCOUNTER — OFFICE VISIT (OUTPATIENT)
Dept: FAMILY MEDICINE | Facility: CLINIC | Age: 73
End: 2018-05-31
Payer: MEDICARE

## 2018-05-31 VITALS
WEIGHT: 125.88 LBS | HEART RATE: 64 BPM | BODY MASS INDEX: 20.23 KG/M2 | SYSTOLIC BLOOD PRESSURE: 158 MMHG | HEIGHT: 66 IN | DIASTOLIC BLOOD PRESSURE: 82 MMHG

## 2018-05-31 DIAGNOSIS — D84.9 IMMUNOSUPPRESSED STATUS: ICD-10-CM

## 2018-05-31 DIAGNOSIS — I77.9 RIGHT-SIDED CAROTID ARTERY DISEASE: ICD-10-CM

## 2018-05-31 DIAGNOSIS — Z94.4 LIVER TRANSPLANTED: Primary | ICD-10-CM

## 2018-05-31 DIAGNOSIS — N18.5 CKD (CHRONIC KIDNEY DISEASE) STAGE 5, GFR LESS THAN 15 ML/MIN: ICD-10-CM

## 2018-05-31 DIAGNOSIS — I15.9 SECONDARY HYPERTENSION: ICD-10-CM

## 2018-05-31 DIAGNOSIS — Z94.0 S/P KIDNEY TRANSPLANT: ICD-10-CM

## 2018-05-31 PROBLEM — E46 MALNUTRITION: Chronic | Status: RESOLVED | Noted: 2017-03-03 | Resolved: 2018-05-31

## 2018-05-31 PROBLEM — D62 ANEMIA DUE TO ACUTE BLOOD LOSS: Status: RESOLVED | Noted: 2017-04-18 | Resolved: 2018-05-31

## 2018-05-31 PROBLEM — K29.50 ANTRITIS (STOMACH): Status: RESOLVED | Noted: 2017-04-21 | Resolved: 2018-05-31

## 2018-05-31 PROBLEM — D58.9 HEMOLYTIC ANEMIA: Status: RESOLVED | Noted: 2017-05-04 | Resolved: 2018-05-31

## 2018-05-31 PROBLEM — E83.51 IATROGENIC HYPOCALCEMIA: Status: RESOLVED | Noted: 2017-09-17 | Resolved: 2018-05-31

## 2018-05-31 PROBLEM — D58.9 HEREDITARY HEMOLYTIC ANEMIA: Status: RESOLVED | Noted: 2017-07-06 | Resolved: 2018-05-31

## 2018-05-31 PROBLEM — E87.1 HYPONATREMIA: Status: RESOLVED | Noted: 2017-09-17 | Resolved: 2018-05-31

## 2018-05-31 PROCEDURE — 99499 UNLISTED E&M SERVICE: CPT | Mod: S$GLB,,, | Performed by: FAMILY MEDICINE

## 2018-05-31 PROCEDURE — 99397 PER PM REEVAL EST PAT 65+ YR: CPT | Mod: S$GLB,,, | Performed by: FAMILY MEDICINE

## 2018-05-31 PROCEDURE — 99999 PR PBB SHADOW E&M-EST. PATIENT-LVL III: CPT | Mod: PBBFAC,,, | Performed by: FAMILY MEDICINE

## 2018-05-31 RX ORDER — ZOLPIDEM TARTRATE 5 MG/1
5 TABLET ORAL NIGHTLY PRN
Qty: 30 TABLET | Refills: 5 | Status: SHIPPED | OUTPATIENT
Start: 2018-05-31

## 2018-05-31 RX ORDER — ALPRAZOLAM 0.25 MG/1
0.25 TABLET ORAL DAILY PRN
Qty: 30 TABLET | Refills: 5 | Status: SHIPPED | OUTPATIENT
Start: 2018-05-31 | End: 2018-01-01 | Stop reason: SDUPTHER

## 2018-05-31 NOTE — PROGRESS NOTES
"Subjective:       Patient ID: Dacia Hamilton is a 72 y.o. female.    Chief Complaint: Follow-up (Pt would like refills on Ambien/Xanax. Nephrology advised pt to see PCP for these.)    Annual exam.  Multiple medical problems. History of liver and kidney transplants.  Kidney transplants have failed and she is now on hemodialysis 3 times a week.  She is had some trouble with her shunt and she needs to have thrombectomy every 3 or 4 months.  Overall she feels well.  She got very sick with Prolia which gave her severe hypocalcemia.  Her blood pressure is allowed to run a little high prior to dialysis because it decreases dramatically with the treatment.  She does not complain of chest pain or leg swelling. No dyspnea.  She has a tortuous right carotid artery but no neurologic symptoms.  There was no aneurysm.      Review of Systems   Constitutional: Negative for fatigue, fever and unexpected weight change.   HENT: Negative for congestion, hearing loss and rhinorrhea.    Eyes: Negative for photophobia and visual disturbance.   Respiratory: Negative for cough, shortness of breath and wheezing.    Cardiovascular: Negative for chest pain and palpitations.   Gastrointestinal: Negative for abdominal pain, blood in stool, constipation, diarrhea and nausea.   Genitourinary: Negative for difficulty urinating, dysuria, hematuria, urgency, vaginal bleeding and vaginal discharge.   Musculoskeletal: Negative for arthralgias and joint swelling.   Neurological: Negative for numbness and headaches.       Objective:     Blood pressure (!) 158/82, pulse 64, height 5' 5.5" (1.664 m), weight 57.1 kg (125 lb 14.1 oz).      Physical Exam   Constitutional: She is oriented to person, place, and time. She appears well-developed and well-nourished. No distress.   HENT:   Right Ear: External ear normal.   Left Ear: External ear normal.   Mouth/Throat: No oropharyngeal exudate.   Eyes: Conjunctivae and EOM are normal. Pupils are equal, round, and " reactive to light. No scleral icterus.   Neck: Normal range of motion. No thyromegaly present.   Cardiovascular: Normal rate, regular rhythm and normal heart sounds.    No murmur heard.  Prominent right carotid pulse.  Nontender. Grade 1 systolic murmur radiates to the right carotid.  Also heard at the left upper sternal border.   Pulmonary/Chest: Effort normal and breath sounds normal. No respiratory distress. She has no wheezes. She has no rales. She exhibits no tenderness.   Abdominal: Soft. She exhibits no distension and no mass. There is no tenderness.   Musculoskeletal: She exhibits no edema.   Lymphadenopathy:     She has no cervical adenopathy.   Neurological: She is alert and oriented to person, place, and time.   Normal gait   Skin: No rash noted.   Psychiatric: She has a normal mood and affect.       Assessment:       1. Liver transplanted    2. S/P kidney transplant    3. Secondary hypertension    4. Immunosuppressed status    5. CKD (chronic kidney disease) stage 5, GFR less than 15 ml/min    6. Right-sided carotid artery disease        Plan:       I refilled her Xanax and Ambien.  She takes them only on occasion.

## 2018-06-11 ENCOUNTER — LAB VISIT (OUTPATIENT)
Dept: LAB | Facility: HOSPITAL | Age: 73
End: 2018-06-11
Attending: INTERNAL MEDICINE
Payer: MEDICARE

## 2018-06-11 DIAGNOSIS — Z94.4 LIVER TRANSPLANTED: ICD-10-CM

## 2018-06-11 LAB
ALBUMIN SERPL BCP-MCNC: 3.7 G/DL
ALP SERPL-CCNC: 176 U/L
ALT SERPL W/O P-5'-P-CCNC: 59 U/L
ANION GAP SERPL CALC-SCNC: 9 MMOL/L
AST SERPL-CCNC: 48 U/L
BASOPHILS # BLD AUTO: 0.04 K/UL
BASOPHILS NFR BLD: 0.6 %
BILIRUB SERPL-MCNC: 0.9 MG/DL
BUN SERPL-MCNC: 23 MG/DL
CALCIUM SERPL-MCNC: 10.3 MG/DL
CHLORIDE SERPL-SCNC: 97 MMOL/L
CO2 SERPL-SCNC: 31 MMOL/L
CREAT SERPL-MCNC: 4.2 MG/DL
DIFFERENTIAL METHOD: ABNORMAL
EOSINOPHIL # BLD AUTO: 0.8 K/UL
EOSINOPHIL NFR BLD: 12.2 %
ERYTHROCYTE [DISTWIDTH] IN BLOOD BY AUTOMATED COUNT: 13 %
EST. GFR  (AFRICAN AMERICAN): 11.3 ML/MIN/1.73 M^2
EST. GFR  (NON AFRICAN AMERICAN): 9.8 ML/MIN/1.73 M^2
GLUCOSE SERPL-MCNC: 88 MG/DL
HCT VFR BLD AUTO: 39.5 %
HGB BLD-MCNC: 12.7 G/DL
IMM GRANULOCYTES # BLD AUTO: 0.01 K/UL
IMM GRANULOCYTES NFR BLD AUTO: 0.2 %
LYMPHOCYTES # BLD AUTO: 1 K/UL
LYMPHOCYTES NFR BLD: 16.4 %
MCH RBC QN AUTO: 35.2 PG
MCHC RBC AUTO-ENTMCNC: 32.2 G/DL
MCV RBC AUTO: 109 FL
MONOCYTES # BLD AUTO: 0.7 K/UL
MONOCYTES NFR BLD: 11.6 %
NEUTROPHILS # BLD AUTO: 3.7 K/UL
NEUTROPHILS NFR BLD: 59 %
NRBC BLD-RTO: 0 /100 WBC
PLATELET # BLD AUTO: 204 K/UL
PMV BLD AUTO: 9.8 FL
POTASSIUM SERPL-SCNC: 3.7 MMOL/L
PROT SERPL-MCNC: 6.6 G/DL
RBC # BLD AUTO: 3.61 M/UL
SODIUM SERPL-SCNC: 137 MMOL/L
WBC # BLD AUTO: 6.29 K/UL

## 2018-06-11 PROCEDURE — 80053 COMPREHEN METABOLIC PANEL: CPT

## 2018-06-11 PROCEDURE — 80197 ASSAY OF TACROLIMUS: CPT

## 2018-06-11 PROCEDURE — 85025 COMPLETE CBC W/AUTO DIFF WBC: CPT

## 2018-06-11 PROCEDURE — 36415 COLL VENOUS BLD VENIPUNCTURE: CPT | Mod: PO

## 2018-06-12 ENCOUNTER — TELEPHONE (OUTPATIENT)
Dept: TRANSPLANT | Facility: CLINIC | Age: 73
End: 2018-06-12

## 2018-06-12 DIAGNOSIS — Z94.4 LIVER TRANSPLANTED: Primary | ICD-10-CM

## 2018-06-12 DIAGNOSIS — R79.89 ABNORMAL LFTS: ICD-10-CM

## 2018-06-12 LAB — TACROLIMUS BLD-MCNC: 4.5 NG/ML

## 2018-06-12 NOTE — TELEPHONE ENCOUNTER
Spoke with pt. Reviewed labs results and discussed that liver numbers are slightly elevated. Pt agreed to ultrasound this week. She will repeat labs 6/18 and is aware that she may need a biopsy next week if labs remain elevated.

## 2018-06-12 NOTE — TELEPHONE ENCOUNTER
----- Message from Victor Hugo Wise MD sent at 6/12/2018 10:26 AM CDT -----  Results reviewed  US and liver biopsy if poss

## 2018-06-13 ENCOUNTER — TELEPHONE (OUTPATIENT)
Dept: TRANSPLANT | Facility: CLINIC | Age: 73
End: 2018-06-13

## 2018-06-14 ENCOUNTER — TELEPHONE (OUTPATIENT)
Dept: TRANSPLANT | Facility: CLINIC | Age: 73
End: 2018-06-14

## 2018-06-15 ENCOUNTER — HOSPITAL ENCOUNTER (OUTPATIENT)
Dept: RADIOLOGY | Facility: CLINIC | Age: 73
Discharge: HOME | End: 2018-06-15
Attending: INTERNAL MEDICINE
Payer: MEDICARE

## 2018-06-15 DIAGNOSIS — R79.89 ABNORMAL LFTS: ICD-10-CM

## 2018-06-15 DIAGNOSIS — Z94.4 LIVER TRANSPLANTED: ICD-10-CM

## 2018-06-15 PROCEDURE — 93975 VASCULAR STUDY: CPT | Mod: 26,,, | Performed by: RADIOLOGY

## 2018-06-15 PROCEDURE — 76705 ECHO EXAM OF ABDOMEN: CPT | Mod: TC,PO

## 2018-06-15 PROCEDURE — 93975 VASCULAR STUDY: CPT | Mod: TC,PO

## 2018-06-15 PROCEDURE — 76705 ECHO EXAM OF ABDOMEN: CPT | Mod: 26,XS,, | Performed by: RADIOLOGY

## 2018-06-18 ENCOUNTER — LAB VISIT (OUTPATIENT)
Dept: LAB | Facility: HOSPITAL | Age: 73
End: 2018-06-18
Attending: INTERNAL MEDICINE
Payer: MEDICARE

## 2018-06-18 DIAGNOSIS — R79.89 ABNORMAL LFTS: ICD-10-CM

## 2018-06-18 DIAGNOSIS — Z94.4 LIVER TRANSPLANTED: ICD-10-CM

## 2018-06-18 LAB
ALBUMIN SERPL BCP-MCNC: 3.9 G/DL
ALP SERPL-CCNC: 166 U/L
ALT SERPL W/O P-5'-P-CCNC: 74 U/L
ANION GAP SERPL CALC-SCNC: 11 MMOL/L
AST SERPL-CCNC: 62 U/L
BASOPHILS # BLD AUTO: 0.04 K/UL
BASOPHILS NFR BLD: 0.7 %
BILIRUB SERPL-MCNC: 0.8 MG/DL
BUN SERPL-MCNC: 25 MG/DL
CALCIUM SERPL-MCNC: 10.4 MG/DL
CHLORIDE SERPL-SCNC: 96 MMOL/L
CO2 SERPL-SCNC: 30 MMOL/L
CREAT SERPL-MCNC: 4.3 MG/DL
DIFFERENTIAL METHOD: ABNORMAL
EOSINOPHIL # BLD AUTO: 1 K/UL
EOSINOPHIL NFR BLD: 16.2 %
ERYTHROCYTE [DISTWIDTH] IN BLOOD BY AUTOMATED COUNT: 13.2 %
EST. GFR  (AFRICAN AMERICAN): 11 ML/MIN/1.73 M^2
EST. GFR  (NON AFRICAN AMERICAN): 9.5 ML/MIN/1.73 M^2
GLUCOSE SERPL-MCNC: 92 MG/DL
HCT VFR BLD AUTO: 41.1 %
HGB BLD-MCNC: 13.4 G/DL
IMM GRANULOCYTES # BLD AUTO: 0.01 K/UL
IMM GRANULOCYTES NFR BLD AUTO: 0.2 %
INR PPP: 1
LYMPHOCYTES # BLD AUTO: 1.2 K/UL
LYMPHOCYTES NFR BLD: 19.2 %
MCH RBC QN AUTO: 35.9 PG
MCHC RBC AUTO-ENTMCNC: 32.6 G/DL
MCV RBC AUTO: 110 FL
MONOCYTES # BLD AUTO: 0.7 K/UL
MONOCYTES NFR BLD: 11.6 %
NEUTROPHILS # BLD AUTO: 3.2 K/UL
NEUTROPHILS NFR BLD: 52.1 %
NRBC BLD-RTO: 0 /100 WBC
PLATELET # BLD AUTO: 187 K/UL
PMV BLD AUTO: 10 FL
POTASSIUM SERPL-SCNC: 3.7 MMOL/L
PROT SERPL-MCNC: 7.2 G/DL
PROTHROMBIN TIME: 10.4 SEC
RBC # BLD AUTO: 3.73 M/UL
SODIUM SERPL-SCNC: 137 MMOL/L
WBC # BLD AUTO: 6.05 K/UL

## 2018-06-18 PROCEDURE — 80197 ASSAY OF TACROLIMUS: CPT

## 2018-06-18 PROCEDURE — 85025 COMPLETE CBC W/AUTO DIFF WBC: CPT

## 2018-06-18 PROCEDURE — 80053 COMPREHEN METABOLIC PANEL: CPT

## 2018-06-18 PROCEDURE — 36415 COLL VENOUS BLD VENIPUNCTURE: CPT | Mod: PO

## 2018-06-18 PROCEDURE — 85610 PROTHROMBIN TIME: CPT

## 2018-06-19 ENCOUNTER — TELEPHONE (OUTPATIENT)
Dept: TRANSPLANT | Facility: CLINIC | Age: 73
End: 2018-06-19

## 2018-06-19 LAB — TACROLIMUS BLD-MCNC: 4.9 NG/ML

## 2018-06-19 NOTE — TELEPHONE ENCOUNTER
Awaiting biopsy time and date.  Received clearance from Dr. Martinez that pt can hold Plavix and fish oil for 5 days before biopsy.

## 2018-06-19 NOTE — TELEPHONE ENCOUNTER
Biopsy order placed and faxed. Awaiting apt.  Pt is on Plavix.  Awaiting pt callback regarding need to hold Plavix and fish oil x 5 days.

## 2018-06-20 ENCOUNTER — TELEPHONE (OUTPATIENT)
Dept: TRANSPLANT | Facility: CLINIC | Age: 73
End: 2018-06-20

## 2018-06-20 DIAGNOSIS — Z94.4 LIVER TRANSPLANTED: Primary | ICD-10-CM

## 2018-06-20 DIAGNOSIS — R79.89 ABNORMAL LFTS: ICD-10-CM

## 2018-06-20 NOTE — TELEPHONE ENCOUNTER
Biopsy scheduled 6/26/18. [facility] at 0800 with biopsy at 1000.      Pt ok to hold fish oil and Plavix per vascular MD, Dr. Martinez.    Spoke with pt. She verbalized understanding to hold both fish oil and aspirin starting tomorrow and continue to hold until after biopsy. She agreed to biopsy Tuesday. She verbalized understanding to fast after midnight Monday, take medications with a small sip of water Tuesday morning and then report to [facility] at 8 with biopsy at 10:30.  Pt's daughter will be with her.

## 2018-06-26 ENCOUNTER — HOSPITAL ENCOUNTER (OUTPATIENT)
Facility: HOSPITAL | Age: 73
Discharge: HOME | End: 2018-06-26
Attending: INTERNAL MEDICINE | Admitting: INTERNAL MEDICINE
Payer: MEDICARE

## 2018-06-26 VITALS
WEIGHT: 125 LBS | RESPIRATION RATE: 16 BRPM | OXYGEN SATURATION: 99 % | BODY MASS INDEX: 20.09 KG/M2 | SYSTOLIC BLOOD PRESSURE: 131 MMHG | HEART RATE: 62 BPM | DIASTOLIC BLOOD PRESSURE: 63 MMHG | HEIGHT: 66 IN | TEMPERATURE: 98 F

## 2018-06-26 DIAGNOSIS — Z94.4 LIVER TRANSPLANTED: ICD-10-CM

## 2018-06-26 DIAGNOSIS — R79.89 ABNORMAL LFTS: ICD-10-CM

## 2018-06-26 DIAGNOSIS — K76.89 LIVER DYSFUNCTION: ICD-10-CM

## 2018-06-26 PROCEDURE — 88342 IMHCHEM/IMCYTCHM 1ST ANTB: CPT | Mod: 26,,, | Performed by: PATHOLOGY

## 2018-06-26 PROCEDURE — 88307 TISSUE EXAM BY PATHOLOGIST: CPT | Mod: 26,,, | Performed by: PATHOLOGY

## 2018-06-26 PROCEDURE — 63600175 PHARM REV CODE 636 W HCPCS: Performed by: RADIOLOGY

## 2018-06-26 PROCEDURE — 88342 IMHCHEM/IMCYTCHM 1ST ANTB: CPT | Performed by: PATHOLOGY

## 2018-06-26 PROCEDURE — 25000003 PHARM REV CODE 250: Performed by: STUDENT IN AN ORGANIZED HEALTH CARE EDUCATION/TRAINING PROGRAM

## 2018-06-26 PROCEDURE — 88307 TISSUE EXAM BY PATHOLOGIST: CPT | Performed by: PATHOLOGY

## 2018-06-26 PROCEDURE — 88313 SPECIAL STAINS GROUP 2: CPT | Mod: 26,,, | Performed by: PATHOLOGY

## 2018-06-26 RX ORDER — FENTANYL CITRATE 50 UG/ML
INJECTION, SOLUTION INTRAMUSCULAR; INTRAVENOUS CODE/TRAUMA/SEDATION MEDICATION
Status: COMPLETED | OUTPATIENT
Start: 2018-06-26 | End: 2018-06-26

## 2018-06-26 RX ORDER — MIDAZOLAM HYDROCHLORIDE 1 MG/ML
INJECTION INTRAMUSCULAR; INTRAVENOUS CODE/TRAUMA/SEDATION MEDICATION
Status: COMPLETED | OUTPATIENT
Start: 2018-06-26 | End: 2018-06-26

## 2018-06-26 RX ORDER — SODIUM CHLORIDE 9 MG/ML
INJECTION, SOLUTION INTRAVENOUS CONTINUOUS
Status: DISCONTINUED | OUTPATIENT
Start: 2018-06-26 | End: 2018-06-26 | Stop reason: HOSPADM

## 2018-06-26 RX ADMIN — FENTANYL CITRATE 50 MCG: 50 INJECTION, SOLUTION INTRAMUSCULAR; INTRAVENOUS at 10:06

## 2018-06-26 RX ADMIN — SODIUM CHLORIDE 500 ML: 0.9 INJECTION, SOLUTION INTRAVENOUS at 09:06

## 2018-06-26 RX ADMIN — MIDAZOLAM HYDROCHLORIDE 1 MG: 1 INJECTION, SOLUTION INTRAMUSCULAR; INTRAVENOUS at 10:06

## 2018-06-26 NOTE — DISCHARGE INSTRUCTIONS
For scheduling: Call Kailee at [phone]    For questions or concerns call: JAY MON-FRI 8 AM- 5PM [phone]. Radiology resident on call [phone].    For immediate concerns that are not emergent, you may call our radiology clinic at: [phone]

## 2018-06-26 NOTE — PROGRESS NOTES
Pt arrived to [facility] 3. Report received from BERTIN Briggs. Dressing to groin dry and intact.

## 2018-06-26 NOTE — PROGRESS NOTES
Pt arrived to [facility]. Report received from BERTIN Chowdary. Dressing to abdomen dry and intact. Family at bedside.

## 2018-06-26 NOTE — DISCHARGE SUMMARY
Radiology Discharge Summary      Hospital Course: No complications    Admit Date: 6/26/2018  Discharge Date: 06/26/2018     Instructions Given to Patient: Yes  Diet: Resume prior diet  Activity: activity as tolerated    Description of Condition on Discharge: Stable  Vital Signs (Most Recent): Temp: 97.9 °F (36.6 °C) (06/26/18 1115)  Pulse: 62 (06/26/18 1430)  Resp: 16 (06/26/18 1430)  BP: 131/63 (06/26/18 1430)  SpO2: 99 % (06/26/18 1430)    Discharge Disposition: Home    Discharge Diagnosis: Abnormal LFTs status post liver transplant     Follow-up: Follow up with Dr. Wise.    Scott Marmolejo  Radiology

## 2018-06-26 NOTE — H&P
Radiology History & Physical      SUBJECTIVE:     Chief Complaint: Abnormal LFTs status post liver transplant.    History of Present Illness:  Dacia Hamilton is a 72 y.o. female who presents for ultrasound guided random liver biopsy.  Past Medical History:   Diagnosis Date    Anemia     BK viruria 2011    Bursitis of left hip     Cataract     CHF (congestive heart failure)     Chronic kidney disease     Disorder of kidney and ureter     Encounter for blood transfusion     GERD (gastroesophageal reflux disease)     takes prilosec    Hyperlipidemia     Hypertension     Hypothyroidism     Influenza 2017    Osteoarthritis     Osteoporosis     Peptic ulcer disease     Primary biliary cirrhosis , ,     3 Liver Transplants (first 2 in same mo)    Sciatica     Urinary tract infection      Past Surgical History:   Procedure Laterality Date    2 previous liver transplant  1991-     at [place]    APPENDECTOMY      AV FISTULA PLACEMENT Left 2017    CATARACT EXTRACTION BILATERAL W/ ANTERIOR VITRECTOMY       SECTION, CLASSIC      CHOLECYSTECTOMY      COLONOSCOPY  2014    Dr. Sánchez; normal findings per patient report    combined liver-kidney transplant  2006    Primary Biliary Cirrhosis    CYSTOSCOPY      EYE SURGERY      hepaticojejunostomy (pretty en Y)  10/2006    KIDNEY TRANSPLANT      25% of Both Kidneys/Third Kidney    LIVER TRANSPLANT      #3 liver transplants     TONSILLECTOMY      TUBAL LIGATION  1968    UPPER GASTROINTESTINAL ENDOSCOPY  2017    Dr. Gibbons, repeat in 2-3 months       Home Meds:   Prior to Admission medications    Medication Sig Start Date End Date Taking? Authorizing Provider   ALPRAZolam (XANAX) 0.25 MG tablet Take 1 tablet (0.25 mg total) by mouth daily as needed. 18  Yes David Woods MD   calcium carbonate (OS-ALBINA) 600 mg calcium (1,500 mg) Tab Take 600 mg by mouth 2 (two) times daily with meals.    Yes Historical Provider, MD   fish oil-omega-3 fatty acids 300-1,000 mg capsule Take 2 g by mouth once daily.     Yes Historical Provider, MD   FOLBEE PLUS 5 mg Tab TAKE 1 TABLET BY MOUTH AT BEDTIME 8/23/17  Yes Germán Rodney MD   FOLIC ACID ORAL Take 400 mg by mouth once daily.   Yes Historical Provider, MD   levothyroxine (SYNTHROID) 75 MCG tablet TAKE 1 TABLET (75 MCG TOTAL) BY MOUTH ONCE DAILY. 7/14/17  Yes Jose Alfredo Godfrey MD   lidocaine-prilocaine (EMLA) cream APPLY TO AFFECTED AREA (FISTULA) 1 HOUR PRIOR TO HD 3/26/17  Yes Historical Provider, MD   phenyleph-min oil-petrolatum (PREPARATION H) 0.25-14-74.9 % Oint Place 1 applicator rectally 4 (four) times daily as needed.   Yes Historical Provider, MD   PROPYLENE GLYCOL/ (SYSTANE, PROPYLENE GLYCOL, OPHT) Apply 1 drop to eye once daily. 1 drop every day both eyes   Yes Historical Provider, MD   tacrolimus (PROGRAF) 1 MG Cap Take 1 capsule (1 mg total) by mouth every 12 (twelve) hours. 4/6/18  Yes Victor Hugo Wise MD   zolpidem (AMBIEN) 5 MG Tab Take 1 tablet (5 mg total) by mouth nightly as needed (insomnia). At bedtime 5/31/18  Yes David Woods MD   clopidogrel (PLAVIX) 75 mg tablet Take 75 mg by mouth once daily.    Historical Provider, MD   ondansetron (ZOFRAN-ODT) 4 MG TbDL Take 1 tablet (4 mg total) by mouth every 6 (six) hours as needed. 2/7/18   Jose Alfredo Godfrey MD   promethazine (PHENERGAN) 12.5 MG Tab Take 1 tablet (12.5 mg total) by mouth every 6 (six) hours as needed (nausea). May make you sleep 1/16/17   Jose Alfredo Godfrey MD     Anticoagulants/Antiplatelets: no anticoagulation    Allergies:   Review of patient's allergies indicates:   Allergen Reactions    Prolia [denosumab] Other (See Comments)     Numbness and low calcium    Banana Nausea Only    Flonase [fluticasone]      Local blisters     Sedation History:  no adverse reactions    Review of Systems:   Hematological: no known coagulopathies  Respiratory: no  shortness of breath  Cardiovascular: no chest pain  Gastrointestinal: no abdominal pain  Genito-Urinary: no dysuria  Musculoskeletal: negative  Neurological: no TIA or stroke symptoms         OBJECTIVE:     Vital Signs (Most Recent)  Temp: 97.7 °F (36.5 °C) (06/26/18 0943)  Pulse: 68 (06/26/18 0943)  Resp: 18 (06/26/18 0943)  BP: (!) 176/80 (06/26/18 0943)  SpO2: 100 % (06/26/18 0943)    Physical Exam:  ASA: 2  Mallampati: 2    General: no acute distress  Mental Status: alert and oriented to person, place and time  HEENT: normocephalic, atraumatic  Chest: unlabored breathing  Heart: regular heart rate  Abdomen: nondistended  Extremity: moves all extremities    Laboratory  Lab Results   Component Value Date    INR 1.0 06/18/2018       Lab Results   Component Value Date    WBC 6.05 06/18/2018    HGB 13.4 06/18/2018    HCT 41.1 06/18/2018     (H) 06/18/2018     06/18/2018      Lab Results   Component Value Date    GLU 92 06/18/2018     06/18/2018    K 3.7 06/18/2018    CL 96 06/18/2018    CO2 30 (H) 06/18/2018    BUN 25 (H) 06/18/2018    CREATININE 4.3 (H) 06/18/2018    CALCIUM 10.4 06/18/2018    MG 2.1 09/27/2017    ALT 74 (H) 06/18/2018    AST 62 (H) 06/18/2018    ALBUMIN 3.9 06/18/2018    BILITOT 0.8 06/18/2018    BILIDIR 0.2 10/07/2016       ASSESSMENT/PLAN:     Sedation Plan: Moderate.  Patient will undergo ultrasound guided liver biopsy.    Scott Marmolejo  Radiology

## 2018-06-26 NOTE — PROGRESS NOTES
Pt discharged to home.  Discharge instructions given, pt and daughters stated understanding.  Dressing to abdomen dry and intact, IV removed.  Pt left via wheelchair with daugher to home.

## 2018-06-28 ENCOUNTER — CONFERENCE (OUTPATIENT)
Dept: TRANSPLANT | Facility: CLINIC | Age: 73
End: 2018-06-28

## 2018-06-28 DIAGNOSIS — Z94.4 LIVER TRANSPLANTED: Primary | ICD-10-CM

## 2018-06-28 DIAGNOSIS — Z94.0 H/O KIDNEY TRANSPLANT: ICD-10-CM

## 2018-06-28 RX ORDER — TACROLIMUS 1 MG/1
CAPSULE ORAL
Qty: 270 CAPSULE | Refills: 3 | Status: SHIPPED | OUTPATIENT
Start: 2018-06-28 | End: 2018-07-06 | Stop reason: SDUPTHER

## 2018-06-28 NOTE — TELEPHONE ENCOUNTER
Spoke with pt. Reviewed biopsy report. Discussed that she does have minimal rejection and since her numbers are elevated, we want to increase her tac to 2/1 starting tomorrow morning.  Pt repeated back instructions and agreed to repeat labs Monday, 7/2/18.

## 2018-06-28 NOTE — TELEPHONE ENCOUNTER
----- Message from Agustin Fontanez MD sent at 6/28/2018 12:52 PM CDT -----  Looks like minimal rejection. But her enzymes are up.   Yes, we can go up on tac to 2/1 and target around 7-8 for now.  I would repeat it on Monday as this may overshoot because she is on HD.    Agustin  ----- Message -----  From: Lorin Mejia RN  Sent: 6/28/2018  12:43 PM  To: Agustin Fontanez MD    This is Dr. Wise' pt.  Would you mind reviewing her for me and letting me know if we need to do anything to treat her possible rejection?    She is on HD.  We have been running her tac low at 1mg BID with a level around 4.5. She has a history of neutropenia every time she is on MMF, so she is not on it currently. Should she increase tac dose and repeat labs in a week or so?    Lorin    ----- Message -----  From: Donna Morales  Sent: 6/28/2018  10:55 AM  To: Lorin Mejia RN    Liver Pathology Conference:    Liver Biopsy: a little endotheleitis and portal inflamation;  ?Minimal Rejection. /  4+ iron/ advanced fibrosis(stage 3).

## 2018-07-02 ENCOUNTER — LAB VISIT (OUTPATIENT)
Dept: LAB | Facility: HOSPITAL | Age: 73
End: 2018-07-02
Attending: INTERNAL MEDICINE
Payer: MEDICARE

## 2018-07-02 DIAGNOSIS — Z94.4 LIVER TRANSPLANTED: ICD-10-CM

## 2018-07-02 LAB
ALBUMIN SERPL BCP-MCNC: 3.8 G/DL
ALP SERPL-CCNC: 162 U/L
ALT SERPL W/O P-5'-P-CCNC: 68 U/L
ANION GAP SERPL CALC-SCNC: 11 MMOL/L
AST SERPL-CCNC: 46 U/L
BASOPHILS # BLD AUTO: 0.05 K/UL
BASOPHILS NFR BLD: 0.7 %
BILIRUB SERPL-MCNC: 1.1 MG/DL
BUN SERPL-MCNC: 21 MG/DL
CALCIUM SERPL-MCNC: 10.5 MG/DL
CHLORIDE SERPL-SCNC: 96 MMOL/L
CO2 SERPL-SCNC: 31 MMOL/L
CREAT SERPL-MCNC: 3.8 MG/DL
DIFFERENTIAL METHOD: ABNORMAL
EOSINOPHIL # BLD AUTO: 1.1 K/UL
EOSINOPHIL NFR BLD: 15.5 %
ERYTHROCYTE [DISTWIDTH] IN BLOOD BY AUTOMATED COUNT: 12.4 %
EST. GFR  (AFRICAN AMERICAN): 12.8 ML/MIN/1.73 M^2
EST. GFR  (NON AFRICAN AMERICAN): 11.1 ML/MIN/1.73 M^2
GLUCOSE SERPL-MCNC: 90 MG/DL
HCT VFR BLD AUTO: 38.4 %
HGB BLD-MCNC: 12.9 G/DL
IMM GRANULOCYTES # BLD AUTO: 0.01 K/UL
IMM GRANULOCYTES NFR BLD AUTO: 0.1 %
LYMPHOCYTES # BLD AUTO: 1.4 K/UL
LYMPHOCYTES NFR BLD: 20.5 %
MCH RBC QN AUTO: 35.1 PG
MCHC RBC AUTO-ENTMCNC: 33.6 G/DL
MCV RBC AUTO: 105 FL
MONOCYTES # BLD AUTO: 0.7 K/UL
MONOCYTES NFR BLD: 10 %
NEUTROPHILS # BLD AUTO: 3.6 K/UL
NEUTROPHILS NFR BLD: 53.2 %
NRBC BLD-RTO: 0 /100 WBC
PLATELET # BLD AUTO: 184 K/UL
PMV BLD AUTO: 10.1 FL
POTASSIUM SERPL-SCNC: 3.3 MMOL/L
PROT SERPL-MCNC: 7.1 G/DL
RBC # BLD AUTO: 3.67 M/UL
SODIUM SERPL-SCNC: 138 MMOL/L
WBC # BLD AUTO: 6.83 K/UL

## 2018-07-02 PROCEDURE — 36415 COLL VENOUS BLD VENIPUNCTURE: CPT | Mod: PO

## 2018-07-02 PROCEDURE — 80197 ASSAY OF TACROLIMUS: CPT

## 2018-07-02 PROCEDURE — 85025 COMPLETE CBC W/AUTO DIFF WBC: CPT

## 2018-07-02 PROCEDURE — 80053 COMPREHEN METABOLIC PANEL: CPT

## 2018-07-03 LAB — TACROLIMUS BLD-MCNC: 4.2 NG/ML

## 2018-07-06 DIAGNOSIS — Z94.4 LIVER TRANSPLANTED: ICD-10-CM

## 2018-07-06 DIAGNOSIS — Z94.0 H/O KIDNEY TRANSPLANT: ICD-10-CM

## 2018-07-06 DIAGNOSIS — Z94.4 LIVER REPLACED BY TRANSPLANT: Primary | ICD-10-CM

## 2018-07-06 NOTE — TELEPHONE ENCOUNTER
----- Message from Didi Dee RN sent at 7/6/2018  2:04 PM CDT -----      ----- Message -----  From: Victor Hugo Wise MD  Sent: 7/5/2018   2:00 PM  To: Didi Dee RN    Go to 2/2    ----- Message -----  From: Didi Dee RN  Sent: 7/5/2018  11:47 AM  To: Victor Hugo Wise MD    Yes this is after that.  She is on 2/1.  What do you want to go up to?  ----- Message -----  From: Victor Hugo Wise MD  Sent: 7/3/2018  10:22 AM  To: [facility]-Liver Transplant Clinical    Results reviewed  This is after the increased tac dose? We need to increase more if this is the case

## 2018-07-06 NOTE — TELEPHONE ENCOUNTER
Notified patient via telephone that their labs were Reviewed with Dr. Wise and we need to change your prograf to 2 mg every am and 2 mg every pm.  Please repeat labs 7/9/18.  Patient repeated change and will get labs on Monday.

## 2018-07-09 ENCOUNTER — LAB VISIT (OUTPATIENT)
Dept: LAB | Facility: HOSPITAL | Age: 73
End: 2018-07-09
Attending: INTERNAL MEDICINE
Payer: MEDICARE

## 2018-07-09 DIAGNOSIS — Z94.4 LIVER REPLACED BY TRANSPLANT: ICD-10-CM

## 2018-07-09 LAB
ALBUMIN SERPL BCP-MCNC: 3.9 G/DL
ALP SERPL-CCNC: 143 U/L
ALT SERPL W/O P-5'-P-CCNC: 64 U/L
ANION GAP SERPL CALC-SCNC: 11 MMOL/L
AST SERPL-CCNC: 39 U/L
BASOPHILS # BLD AUTO: 0.01 K/UL
BASOPHILS NFR BLD: 0.1 %
BILIRUB SERPL-MCNC: 0.9 MG/DL
BUN SERPL-MCNC: 27 MG/DL
CALCIUM SERPL-MCNC: 10.3 MG/DL
CHLORIDE SERPL-SCNC: 96 MMOL/L
CO2 SERPL-SCNC: 31 MMOL/L
CREAT SERPL-MCNC: 4.2 MG/DL
DIFFERENTIAL METHOD: ABNORMAL
EOSINOPHIL # BLD AUTO: 0.8 K/UL
EOSINOPHIL NFR BLD: 11.8 %
ERYTHROCYTE [DISTWIDTH] IN BLOOD BY AUTOMATED COUNT: 12.3 %
EST. GFR  (AFRICAN AMERICAN): 11.3 ML/MIN/1.73 M^2
EST. GFR  (NON AFRICAN AMERICAN): 9.8 ML/MIN/1.73 M^2
GLUCOSE SERPL-MCNC: 98 MG/DL
HCT VFR BLD AUTO: 36.2 %
HGB BLD-MCNC: 12.2 G/DL
IMM GRANULOCYTES # BLD AUTO: 0.02 K/UL
IMM GRANULOCYTES NFR BLD AUTO: 0.3 %
LYMPHOCYTES # BLD AUTO: 1.4 K/UL
LYMPHOCYTES NFR BLD: 20.1 %
MCH RBC QN AUTO: 35.3 PG
MCHC RBC AUTO-ENTMCNC: 33.7 G/DL
MCV RBC AUTO: 105 FL
MONOCYTES # BLD AUTO: 0.6 K/UL
MONOCYTES NFR BLD: 9.1 %
NEUTROPHILS # BLD AUTO: 3.9 K/UL
NEUTROPHILS NFR BLD: 58.6 %
NRBC BLD-RTO: 0 /100 WBC
PLATELET # BLD AUTO: 170 K/UL
PMV BLD AUTO: 10.1 FL
POTASSIUM SERPL-SCNC: 3.7 MMOL/L
PROT SERPL-MCNC: 7.1 G/DL
RBC # BLD AUTO: 3.46 M/UL
SODIUM SERPL-SCNC: 138 MMOL/L
TACROLIMUS BLD-MCNC: 10.2 NG/ML
WBC # BLD AUTO: 6.7 K/UL

## 2018-07-09 PROCEDURE — 80053 COMPREHEN METABOLIC PANEL: CPT

## 2018-07-09 PROCEDURE — 80197 ASSAY OF TACROLIMUS: CPT

## 2018-07-09 PROCEDURE — 36415 COLL VENOUS BLD VENIPUNCTURE: CPT | Mod: PO

## 2018-07-09 PROCEDURE — 85025 COMPLETE CBC W/AUTO DIFF WBC: CPT

## 2018-07-09 RX ORDER — TACROLIMUS 1 MG/1
2 CAPSULE ORAL EVERY 12 HOURS
Qty: 120 CAPSULE | Refills: 3 | Status: SHIPPED | OUTPATIENT
Start: 2018-07-09 | End: 2018-09-07 | Stop reason: SDUPTHER

## 2018-07-10 ENCOUNTER — TELEPHONE (OUTPATIENT)
Dept: TRANSPLANT | Facility: CLINIC | Age: 73
End: 2018-07-10

## 2018-07-16 ENCOUNTER — TELEPHONE (OUTPATIENT)
Dept: HEMATOLOGY/ONCOLOGY | Facility: CLINIC | Age: 73
End: 2018-07-16

## 2018-07-16 ENCOUNTER — LAB VISIT (OUTPATIENT)
Dept: LAB | Facility: HOSPITAL | Age: 73
End: 2018-07-16
Attending: INTERNAL MEDICINE
Payer: MEDICARE

## 2018-07-16 DIAGNOSIS — Z94.4 LIVER REPLACED BY TRANSPLANT: ICD-10-CM

## 2018-07-16 LAB
ALBUMIN SERPL BCP-MCNC: 3.8 G/DL
ALP SERPL-CCNC: 141 U/L
ALT SERPL W/O P-5'-P-CCNC: 64 U/L
ANION GAP SERPL CALC-SCNC: 11 MMOL/L
AST SERPL-CCNC: 52 U/L
BASOPHILS # BLD AUTO: 0.04 K/UL
BASOPHILS NFR BLD: 0.6 %
BILIRUB SERPL-MCNC: 0.9 MG/DL
BUN SERPL-MCNC: 21 MG/DL
CALCIUM SERPL-MCNC: 10.4 MG/DL
CHLORIDE SERPL-SCNC: 96 MMOL/L
CO2 SERPL-SCNC: 32 MMOL/L
CREAT SERPL-MCNC: 3.7 MG/DL
DIFFERENTIAL METHOD: ABNORMAL
EOSINOPHIL # BLD AUTO: 1 K/UL
EOSINOPHIL NFR BLD: 14.8 %
ERYTHROCYTE [DISTWIDTH] IN BLOOD BY AUTOMATED COUNT: 12.2 %
EST. GFR  (AFRICAN AMERICAN): 13.2 ML/MIN/1.73 M^2
EST. GFR  (NON AFRICAN AMERICAN): 11.4 ML/MIN/1.73 M^2
GLUCOSE SERPL-MCNC: 105 MG/DL
HCT VFR BLD AUTO: 35.2 %
HGB BLD-MCNC: 11.7 G/DL
IMM GRANULOCYTES # BLD AUTO: 0.01 K/UL
IMM GRANULOCYTES NFR BLD AUTO: 0.2 %
LYMPHOCYTES # BLD AUTO: 1.2 K/UL
LYMPHOCYTES NFR BLD: 18.3 %
MCH RBC QN AUTO: 35 PG
MCHC RBC AUTO-ENTMCNC: 33.2 G/DL
MCV RBC AUTO: 105 FL
MONOCYTES # BLD AUTO: 0.6 K/UL
MONOCYTES NFR BLD: 9.2 %
NEUTROPHILS # BLD AUTO: 3.7 K/UL
NEUTROPHILS NFR BLD: 56.9 %
NRBC BLD-RTO: 0 /100 WBC
PLATELET # BLD AUTO: 166 K/UL
PMV BLD AUTO: 10.1 FL
POTASSIUM SERPL-SCNC: 3.6 MMOL/L
PROT SERPL-MCNC: 7.1 G/DL
RBC # BLD AUTO: 3.34 M/UL
SODIUM SERPL-SCNC: 139 MMOL/L
WBC # BLD AUTO: 6.5 K/UL

## 2018-07-16 PROCEDURE — 80197 ASSAY OF TACROLIMUS: CPT

## 2018-07-16 PROCEDURE — 80053 COMPREHEN METABOLIC PANEL: CPT

## 2018-07-16 PROCEDURE — 85025 COMPLETE CBC W/AUTO DIFF WBC: CPT

## 2018-07-16 PROCEDURE — 36415 COLL VENOUS BLD VENIPUNCTURE: CPT | Mod: PO

## 2018-07-16 NOTE — TELEPHONE ENCOUNTER
----- Message from Salma Borges sent at 7/16/2018  4:07 PM CDT -----  Cancel appointment and labs.  Please call patient at [phone].

## 2018-07-17 ENCOUNTER — TELEPHONE (OUTPATIENT)
Dept: TRANSPLANT | Facility: CLINIC | Age: 73
End: 2018-07-17

## 2018-07-17 DIAGNOSIS — R79.89 ABNORMAL LFTS: ICD-10-CM

## 2018-07-17 DIAGNOSIS — Z94.4 LIVER TRANSPLANTED: Primary | ICD-10-CM

## 2018-07-17 LAB — TACROLIMUS BLD-MCNC: 7.9 NG/ML

## 2018-07-17 NOTE — TELEPHONE ENCOUNTER
----- Message from Victor Hugo Wise MD sent at 7/17/2018  9:27 AM CDT -----  Results reviewed  US please

## 2018-07-17 NOTE — TELEPHONE ENCOUNTER
Called pt. Reviewed lab results. Pt agreed to repeat u/s this week.  Order placed. Will call once scheduled. Pt agreed with plan.

## 2018-07-18 ENCOUNTER — PATIENT MESSAGE (OUTPATIENT)
Dept: TRANSPLANT | Facility: CLINIC | Age: 73
End: 2018-07-18

## 2018-07-19 ENCOUNTER — HOSPITAL ENCOUNTER (OUTPATIENT)
Dept: RADIOLOGY | Facility: CLINIC | Age: 73
Discharge: HOME | End: 2018-07-19
Attending: INTERNAL MEDICINE
Payer: MEDICARE

## 2018-07-19 DIAGNOSIS — R79.89 ABNORMAL LFTS: ICD-10-CM

## 2018-07-19 DIAGNOSIS — Z94.4 LIVER TRANSPLANTED: ICD-10-CM

## 2018-07-19 PROCEDURE — 93975 VASCULAR STUDY: CPT | Mod: 26,,, | Performed by: RADIOLOGY

## 2018-07-19 PROCEDURE — 93975 VASCULAR STUDY: CPT | Mod: TC,PO

## 2018-07-19 PROCEDURE — 76705 ECHO EXAM OF ABDOMEN: CPT | Mod: 26,59,, | Performed by: RADIOLOGY

## 2018-07-23 ENCOUNTER — PATIENT MESSAGE (OUTPATIENT)
Dept: FAMILY MEDICINE | Facility: CLINIC | Age: 73
End: 2018-07-23

## 2018-07-23 ENCOUNTER — LAB VISIT (OUTPATIENT)
Dept: LAB | Facility: HOSPITAL | Age: 73
End: 2018-07-23
Attending: INTERNAL MEDICINE
Payer: MEDICARE

## 2018-07-23 DIAGNOSIS — Z94.4 LIVER REPLACED BY TRANSPLANT: ICD-10-CM

## 2018-07-23 DIAGNOSIS — Z94.4 LIVER TRANSPLANTED: ICD-10-CM

## 2018-07-23 DIAGNOSIS — R79.89 ABNORMAL LFTS: ICD-10-CM

## 2018-07-23 LAB
ALBUMIN SERPL BCP-MCNC: 3.7 G/DL
ALP SERPL-CCNC: 134 U/L
ALT SERPL W/O P-5'-P-CCNC: 50 U/L
ANION GAP SERPL CALC-SCNC: 10 MMOL/L
AST SERPL-CCNC: 45 U/L
BASOPHILS # BLD AUTO: 0.04 K/UL
BASOPHILS NFR BLD: 0.7 %
BILIRUB SERPL-MCNC: 0.9 MG/DL
BUN SERPL-MCNC: 15 MG/DL
CALCIUM SERPL-MCNC: 9.8 MG/DL
CHLORIDE SERPL-SCNC: 95 MMOL/L
CO2 SERPL-SCNC: 32 MMOL/L
CREAT SERPL-MCNC: 3.9 MG/DL
DIFFERENTIAL METHOD: ABNORMAL
EOSINOPHIL # BLD AUTO: 0.8 K/UL
EOSINOPHIL NFR BLD: 13 %
ERYTHROCYTE [DISTWIDTH] IN BLOOD BY AUTOMATED COUNT: 12.8 %
EST. GFR  (AFRICAN AMERICAN): 12.4 ML/MIN/1.73 M^2
EST. GFR  (NON AFRICAN AMERICAN): 10.7 ML/MIN/1.73 M^2
GLUCOSE SERPL-MCNC: 88 MG/DL
HCT VFR BLD AUTO: 32.3 %
HGB BLD-MCNC: 10.5 G/DL
IMM GRANULOCYTES # BLD AUTO: 0.01 K/UL
IMM GRANULOCYTES NFR BLD AUTO: 0.2 %
INR PPP: 1
LYMPHOCYTES # BLD AUTO: 1.3 K/UL
LYMPHOCYTES NFR BLD: 22.5 %
MCH RBC QN AUTO: 34.1 PG
MCHC RBC AUTO-ENTMCNC: 32.5 G/DL
MCV RBC AUTO: 105 FL
MONOCYTES # BLD AUTO: 0.8 K/UL
MONOCYTES NFR BLD: 13.8 %
NEUTROPHILS # BLD AUTO: 2.9 K/UL
NEUTROPHILS NFR BLD: 49.8 %
NRBC BLD-RTO: 0 /100 WBC
PLATELET # BLD AUTO: 187 K/UL
PMV BLD AUTO: 9.8 FL
POTASSIUM SERPL-SCNC: 3.9 MMOL/L
PROT SERPL-MCNC: 6.8 G/DL
PROTHROMBIN TIME: 10.3 SEC
RBC # BLD AUTO: 3.08 M/UL
SODIUM SERPL-SCNC: 137 MMOL/L
WBC # BLD AUTO: 5.86 K/UL

## 2018-07-23 PROCEDURE — 85610 PROTHROMBIN TIME: CPT

## 2018-07-23 PROCEDURE — 85025 COMPLETE CBC W/AUTO DIFF WBC: CPT

## 2018-07-23 PROCEDURE — 36415 COLL VENOUS BLD VENIPUNCTURE: CPT | Mod: PO

## 2018-07-23 PROCEDURE — 80053 COMPREHEN METABOLIC PANEL: CPT

## 2018-07-23 PROCEDURE — 80197 ASSAY OF TACROLIMUS: CPT

## 2018-07-24 ENCOUNTER — TELEPHONE (OUTPATIENT)
Dept: TRANSPLANT | Facility: CLINIC | Age: 73
End: 2018-07-24

## 2018-07-24 LAB — TACROLIMUS BLD-MCNC: 7.9 NG/ML

## 2018-07-25 ENCOUNTER — TELEPHONE (OUTPATIENT)
Dept: TRANSPLANT | Facility: CLINIC | Age: 73
End: 2018-07-25

## 2018-07-25 DIAGNOSIS — R79.89 ABNORMAL LFTS: ICD-10-CM

## 2018-07-25 DIAGNOSIS — Z94.4 LIVER TRANSPLANTED: Primary | ICD-10-CM

## 2018-07-25 RX ORDER — LEVOTHYROXINE SODIUM 75 UG/1
TABLET ORAL
Qty: 90 TABLET | Refills: 1 | OUTPATIENT
Start: 2018-07-25

## 2018-07-25 NOTE — TELEPHONE ENCOUNTER
----- Message from Victor Hugo Wise MD sent at 7/25/2018  1:24 PM CDT -----  Repeat next week and if not normal, we can redo biopsy    ----- Message -----  From: Lorin Mejia RN  Sent: 7/25/2018   9:34 AM  To: Victor Hugo Wise MD    Are you concerned about her liver numbers? She did have rejection on biopsy 6/26. We have pushed her prograf since and her numbers are still not normal. She is worried and wants to know if there is anything else she needs to do.    When do you want repeat labs?    Lorin    ----- Message -----  From: Victor Hugo Wise MD  Sent: 7/24/2018   2:55 PM  To: [facility]-Liver Transplant Clinical    Results reviewed

## 2018-07-30 ENCOUNTER — LAB VISIT (OUTPATIENT)
Dept: LAB | Facility: HOSPITAL | Age: 73
End: 2018-07-30
Attending: INTERNAL MEDICINE
Payer: MEDICARE

## 2018-07-30 ENCOUNTER — OFFICE VISIT (OUTPATIENT)
Dept: OPHTHALMOLOGY | Facility: CLINIC | Age: 73
End: 2018-07-30
Payer: MEDICARE

## 2018-07-30 DIAGNOSIS — Z94.4 LIVER TRANSPLANTED: ICD-10-CM

## 2018-07-30 DIAGNOSIS — Z96.1 PSEUDOPHAKIA OF BOTH EYES: ICD-10-CM

## 2018-07-30 DIAGNOSIS — H43.813 POSTERIOR VITREOUS DETACHMENT OF BOTH EYES: Primary | ICD-10-CM

## 2018-07-30 DIAGNOSIS — R79.89 ABNORMAL LFTS: ICD-10-CM

## 2018-07-30 LAB
ALBUMIN SERPL BCP-MCNC: 3.7 G/DL
ALP SERPL-CCNC: 128 U/L
ALT SERPL W/O P-5'-P-CCNC: 36 U/L
ANION GAP SERPL CALC-SCNC: 10 MMOL/L
AST SERPL-CCNC: 32 U/L
BASOPHILS # BLD AUTO: 0.03 K/UL
BASOPHILS NFR BLD: 0.5 %
BILIRUB SERPL-MCNC: 1.1 MG/DL
BUN SERPL-MCNC: 20 MG/DL
CALCIUM SERPL-MCNC: 9.8 MG/DL
CHLORIDE SERPL-SCNC: 95 MMOL/L
CO2 SERPL-SCNC: 30 MMOL/L
CREAT SERPL-MCNC: 4.2 MG/DL
DIFFERENTIAL METHOD: ABNORMAL
EOSINOPHIL # BLD AUTO: 0.6 K/UL
EOSINOPHIL NFR BLD: 11 %
ERYTHROCYTE [DISTWIDTH] IN BLOOD BY AUTOMATED COUNT: 13.8 %
EST. GFR  (AFRICAN AMERICAN): 11.3 ML/MIN/1.73 M^2
EST. GFR  (NON AFRICAN AMERICAN): 9.8 ML/MIN/1.73 M^2
GLUCOSE SERPL-MCNC: 93 MG/DL
HCT VFR BLD AUTO: 33 %
HGB BLD-MCNC: 10.9 G/DL
IMM GRANULOCYTES # BLD AUTO: 0.01 K/UL
IMM GRANULOCYTES NFR BLD AUTO: 0.2 %
INR PPP: 1
LYMPHOCYTES # BLD AUTO: 1.2 K/UL
LYMPHOCYTES NFR BLD: 22 %
MCH RBC QN AUTO: 35 PG
MCHC RBC AUTO-ENTMCNC: 33 G/DL
MCV RBC AUTO: 106 FL
MONOCYTES # BLD AUTO: 0.7 K/UL
MONOCYTES NFR BLD: 13.2 %
NEUTROPHILS # BLD AUTO: 2.9 K/UL
NEUTROPHILS NFR BLD: 53.1 %
NRBC BLD-RTO: 0 /100 WBC
PLATELET # BLD AUTO: 198 K/UL
PMV BLD AUTO: 9.8 FL
POTASSIUM SERPL-SCNC: 3.4 MMOL/L
PROT SERPL-MCNC: 6.9 G/DL
PROTHROMBIN TIME: 10.3 SEC
RBC # BLD AUTO: 3.11 M/UL
SODIUM SERPL-SCNC: 135 MMOL/L
WBC # BLD AUTO: 5.54 K/UL

## 2018-07-30 PROCEDURE — 99999 PR PBB SHADOW E&M-EST. PATIENT-LVL II: CPT | Mod: PBBFAC,,, | Performed by: OPHTHALMOLOGY

## 2018-07-30 PROCEDURE — 80197 ASSAY OF TACROLIMUS: CPT

## 2018-07-30 PROCEDURE — 36415 COLL VENOUS BLD VENIPUNCTURE: CPT | Mod: PO

## 2018-07-30 PROCEDURE — 92004 COMPRE OPH EXAM NEW PT 1/>: CPT | Mod: S$GLB,,, | Performed by: OPHTHALMOLOGY

## 2018-07-30 PROCEDURE — 80053 COMPREHEN METABOLIC PANEL: CPT

## 2018-07-30 PROCEDURE — 85610 PROTHROMBIN TIME: CPT | Mod: PO

## 2018-07-30 PROCEDURE — 85025 COMPLETE CBC W/AUTO DIFF WBC: CPT

## 2018-07-30 RX ORDER — PANTOPRAZOLE SODIUM 40 MG/1
40 TABLET, DELAYED RELEASE ORAL DAILY
COMMUNITY
End: 2018-01-01

## 2018-07-30 RX ORDER — BENZONATATE 100 MG/1
100 CAPSULE ORAL 3 TIMES DAILY PRN
COMMUNITY
End: 2019-01-01 | Stop reason: CLARIF

## 2018-07-30 NOTE — PATIENT INSTRUCTIONS
What Are Flashes and Floaters?  Have you ever seen flashes of light, stars, or streaks that arent really there? A few of these flashes are seen by everyone from time to time. Usually you see them in one eye at a time. Flashes are often caused by the gel filling inside of your eye, called the vitreous, pulling on the retina. The retina is a membrane that lines the inside of your eye.  Floaters look like dark specks, clouds, threads, or spider webs moving through your eyesight. Most people see them once in a while. Floaters may be pieces of gel or other material floating inside your eye. They are usually harmless.      Who Gets Flashes?  As you age or if you are nearsighted, you are more likely to see flashes. Nearsightedness is when you have fuzzy distance vision. Sometimes, flashes are a sign of other eye problems that need care.  Who Gets Floaters?  The older you get, the more likely youll notice floaters. Floaters can also be caused by an eye injury or surgery. People who are very nearsighted may get more floaters. If floaters appear suddenly or greatly increase in number, see your healthcare provider. This may be a sign of an eye problem.  Date Last Reviewed: 5/31/2015 © 2000-2017 The [employer]. [address] [place] [address], [place] [address]. All rights reserved. This information is not intended as a substitute for professional medical care. Always follow your healthcare professional's instructions.

## 2018-07-30 NOTE — PROGRESS NOTES
HPI     Eye Problem    Additional comments: Flashes and shadows OS           Comments   DLE: x 1 yr    Pt states on Saturday she started seeing dark shadows and flashes of light   OS. No pain but says feels like a film in [place]. No flashes or   floaters OD. Uses Systane PRN OU       Last edited by Cheryle Quintana on 7/30/2018 11:12 AM. (History)        ROS     Negative for: Constitutional, Gastrointestinal, Neurological, Skin,   Genitourinary, Musculoskeletal, HENT, Endocrine, Cardiovascular, Eyes,   Respiratory, Psychiatric, Allergic/Imm, Heme/Lymph    Last edited by Eric Abad Jr., MD on 7/30/2018 11:22 AM. (History)        Assessment /Plan     For exam results, see Encounter Report.    Posterior vitreous detachment of both eyes    Pseudophakia of both eyes        Posterior vitreous detachment of both eyes  Patient was counseled on the following:  IF YOU HAVE:  1. Increase in floaters or flashing lights  2. Worsening vision  3. Appearance of a persistent curtain  4. Shadow anywhere in the field  of vision  Return immediately if these symptoms develop.  There is an increased possibility that you will have the develop floaters and flashes in the opposite eye.    Pseudophakia of both eyes-stable

## 2018-07-31 ENCOUNTER — TELEPHONE (OUTPATIENT)
Dept: TRANSPLANT | Facility: CLINIC | Age: 73
End: 2018-07-31

## 2018-07-31 LAB — TACROLIMUS BLD-MCNC: 7.4 NG/ML

## 2018-07-31 NOTE — TELEPHONE ENCOUNTER
Pt notified via portal of stable labs and that no medication changes are needed. Repeat labs due 8/27/18.

## 2018-08-13 NOTE — TELEPHONE ENCOUNTER
Received fax stating \"Therapy notified writer of resident having lower than normal BP's. Can we please get an order to take vitals daily/weekly\".    Caregiver, Marla, denies symptoms or patient complaints at this time. Patient is taking medications as prescribed.    Vitals log received and placed in provider bin for review.   Reviewed medications with Dr. Wise. Per MD, pt to stop myfortic.    Spoke with pt, advised to stop myfortic and repeat labs 1/23/17.

## 2018-08-20 ENCOUNTER — OFFICE VISIT (OUTPATIENT)
Dept: TRANSPLANT | Facility: CLINIC | Age: 73
End: 2018-08-20
Payer: MEDICARE

## 2018-08-20 VITALS
DIASTOLIC BLOOD PRESSURE: 82 MMHG | RESPIRATION RATE: 16 BRPM | SYSTOLIC BLOOD PRESSURE: 166 MMHG | HEART RATE: 72 BPM | OXYGEN SATURATION: 100 % | TEMPERATURE: 98 F | WEIGHT: 127.63 LBS | BODY MASS INDEX: 21.26 KG/M2 | HEIGHT: 65 IN

## 2018-08-20 DIAGNOSIS — D84.9 IMMUNOSUPPRESSED STATUS: ICD-10-CM

## 2018-08-20 DIAGNOSIS — Z94.4 LIVER TRANSPLANTED: ICD-10-CM

## 2018-08-20 DIAGNOSIS — K74.3 PRIMARY BILIARY CHOLANGITIS: ICD-10-CM

## 2018-08-20 DIAGNOSIS — I15.9 SECONDARY HYPERTENSION: ICD-10-CM

## 2018-08-20 DIAGNOSIS — Z99.2 DEPENDENCE ON HEMODIALYSIS: ICD-10-CM

## 2018-08-20 DIAGNOSIS — Z94.0 S/P KIDNEY TRANSPLANT: Primary | ICD-10-CM

## 2018-08-20 PROCEDURE — 99214 OFFICE O/P EST MOD 30 MIN: CPT | Mod: S$GLB,,, | Performed by: INTERNAL MEDICINE

## 2018-08-20 PROCEDURE — 99999 PR PBB SHADOW E&M-EST. PATIENT-LVL IV: CPT | Mod: PBBFAC,,, | Performed by: INTERNAL MEDICINE

## 2018-08-20 PROCEDURE — 99499 UNLISTED E&M SERVICE: CPT | Mod: S$GLB,,, | Performed by: INTERNAL MEDICINE

## 2018-08-20 NOTE — PROGRESS NOTES
Subjective:       Patient ID: Dacia Hamilton is a 72 y.o. female.    Chief Complaint: Liver Transplant Follow-up    HPI  I saw this 72 y.o. lady who had 2 liver transplants in [place] in 1991. 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     - now on dialysis and feels better.  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    She is currently on Tac alone.    Normal LFTs.    Abdo US: 7/19/2018  Status post liver transplant, with Doppler examination demonstrating no detrimental change.  Mild decrease in hepatic arterial resistive indices observed.    Severe bilateral renal atrophy with small bilateral renal cysts.      Review of Systems   Constitutional: Negative for activity change, appetite change, chills, fatigue, fever and unexpected weight change.   HENT: Negative for ear pain, hearing loss, nosebleeds, sore throat and trouble swallowing.    Eyes: Negative for redness and visual disturbance.   Respiratory: Negative for cough, chest tightness, shortness of breath and wheezing.    Cardiovascular: Negative for chest pain and palpitations.   Gastrointestinal: Negative for abdominal distention, abdominal pain, blood in stool, constipation, diarrhea, nausea and vomiting.   Genitourinary: Negative for difficulty urinating, dysuria, frequency, hematuria and urgency.   Musculoskeletal: Negative for arthralgias, back pain, gait problem, joint swelling and myalgias.   Skin: Negative for rash.   Neurological: Negative for tremors, seizures, speech difficulty, weakness and headaches.   Hematological: Negative for adenopathy.   Psychiatric/Behavioral: Negative for confusion, decreased concentration and sleep disturbance. The patient is not nervous/anxious.            Lab Results   Component Value Date     ALT 36 2018    AST 32 2018    GGT 25 2014    ALKPHOS 128 2018    BILITOT 1.1 (H) 2018     Past Medical History:   Diagnosis Date    Anemia     BK viruria 2011    Bursitis of left hip     Cataract     CHF (congestive heart failure)     Chronic kidney disease     Disorder of kidney and ureter     Encounter for blood transfusion     GERD (gastroesophageal reflux disease)     takes prilosec    Hyperlipidemia     Hypertension     Hypothyroidism     Influenza 2017    Osteoarthritis     Osteoporosis     Peptic ulcer disease     Primary biliary cirrhosis , ,     3 Liver Transplants (first 2 in same mo)    Sciatica     Urinary tract infection      Past Surgical History:   Procedure Laterality Date    2 previous liver transplant  1991-     at [place]    APPENDECTOMY      AV FISTULA PLACEMENT Left 2017    CATARACT EXTRACTION BILATERAL W/ ANTERIOR VITRECTOMY      CATARACT EXTRACTION W/  INTRAOCULAR LENS IMPLANT Bilateral      SECTION, CLASSIC  1968    CHOLECYSTECTOMY      COLONOSCOPY  2014    Dr. Sánchez; normal findings per patient report    combined liver-kidney transplant  2006    Primary Biliary Cirrhosis    CYSTOSCOPY      EYE SURGERY      hepaticojejunostomy (pretty en Y)  10/2006    KIDNEY TRANSPLANT      25% of Both Kidneys/Third Kidney    LIVER TRANSPLANT      #3 liver transplants     TONSILLECTOMY      TUBAL LIGATION  1968    UPPER GASTROINTESTINAL ENDOSCOPY  2017    Dr. Gibbons, repeat in 2-3 months     Current Outpatient Medications   Medication Sig    ALPRAZolam (XANAX) 0.25 MG tablet Take 1 tablet (0.25 mg total) by mouth daily as needed.    benzonatate (TESSALON) 100 MG capsule Take 100 mg by mouth 3 (three) times daily as needed for Cough.    calcium carbonate (OS-ALBINA) 600 mg calcium (1,500 mg) Tab Take 600 mg by mouth 2 (two) times daily with meals.    clopidogrel (PLAVIX) 75 mg tablet  Take 75 mg by mouth once daily.    fish oil-omega-3 fatty acids 300-1,000 mg capsule Take 2 g by mouth once daily.      FOLBEE PLUS 5 mg Tab TAKE 1 TABLET BY MOUTH AT BEDTIME    FOLIC ACID ORAL Take 400 mg by mouth once daily.    levothyroxine (SYNTHROID) 75 MCG tablet TAKE 1 TABLET (75 MCG TOTAL) BY MOUTH ONCE DAILY.    lidocaine-prilocaine (EMLA) cream APPLY TO AFFECTED AREA (FISTULA) 1 HOUR PRIOR TO HD    ondansetron (ZOFRAN-ODT) 4 MG TbDL Take 1 tablet (4 mg total) by mouth every 6 (six) hours as needed.    pantoprazole (PROTONIX) 40 MG tablet Take 40 mg by mouth once daily.    phenyleph-min oil-petrolatum (PREPARATION H) 0.25-14-74.9 % Oint Place 1 applicator rectally 4 (four) times daily as needed.    promethazine (PHENERGAN) 12.5 MG Tab Take 1 tablet (12.5 mg total) by mouth every 6 (six) hours as needed (nausea). May make you sleep    PROPYLENE GLYCOL/ (SYSTANE, PROPYLENE GLYCOL, OPHT) Apply 1 drop to eye once daily. 1 drop every day both eyes    tacrolimus (PROGRAF) 1 MG Cap Take 2 capsules (2 mg total) by mouth every 12 (twelve) hours.    zolpidem (AMBIEN) 5 MG Tab Take 1 tablet (5 mg total) by mouth nightly as needed (insomnia). At bedtime     No current facility-administered medications for this visit.        Objective:      Physical Exam   Constitutional: She appears well-nourished. No distress.   HENT:   Head: Normocephalic.   Eyes: Pupils are equal, round, and reactive to light.   Neck: No JVD present. No tracheal deviation present. No thyromegaly present.   Cardiovascular: Normal rate, regular rhythm and normal heart sounds.   No murmur heard.  Pulmonary/Chest: Effort normal and breath sounds normal. No stridor.   Abdominal: Soft.   Lymphadenopathy:        Head (right side): No submental, no submandibular, no tonsillar, no preauricular, no posterior auricular and no occipital adenopathy present.        Head (left side): No submental, no submandibular, no tonsillar, no preauricular,  no posterior auricular and no occipital adenopathy present.     She has no cervical adenopathy.     She has no axillary adenopathy.   Neurological: She is alert. She has normal strength. She is not disoriented. No cranial nerve deficit or sensory deficit.   Skin: Skin is intact. No cyanosis.       Assessment:       1. S/P kidney transplant    2. Secondary hypertension    3. Primary biliary cholangitis    4. Liver transplanted    5. Immunosuppressed status    6. Dependence on hemodialysis        Plan:   No changes to immunosuppression.  PCP will follow BP  Clinic in 1 year- video visit.    UNOS Patient Status  Functional Status: 100% - Normal, no complaints, no evidence of disease  Physical Capacity: No Limitations

## 2018-08-20 NOTE — LETTER
August 24, 2018        David Woods  [address] [place] [address]  Phone: [phone]  Fax: [phone]             [facility]  [address]  [facility] [address]  Phone: [phone]   Patient: Dacia Hamilton   MR Number: [id number]   YOB: 1945   Date of Visit: 8/20/2018       Dear Dr. David Woods    Thank you for referring Dacia Hamilton to me for evaluation. Attached you will find relevant portions of my assessment and plan of care.    If you have questions, please do not hesitate to call me. I look forward to following Dacia Hamilton along with you.    Sincerely,    Victor Hugo Wise MD    Enclosure    If you would like to receive this communication electronically, please contact [email or web address] or [phone] to request [employer] access.    [employer] is a tool which provides read-only access to select patient information with whom you have a relationship. Its easy to use and provides real time access to review your patients record including encounter summaries, notes, results, and demographic information.    If you feel you have received this communication in error or would no longer like to receive these types of communications, please e-mail [email or web address]

## 2018-08-24 PROBLEM — Z99.2 DEPENDENCE ON HEMODIALYSIS: Status: ACTIVE | Noted: 2018-08-24

## 2018-08-27 ENCOUNTER — TELEPHONE (OUTPATIENT)
Dept: TRANSPLANT | Facility: CLINIC | Age: 73
End: 2018-08-27

## 2018-08-27 ENCOUNTER — LAB VISIT (OUTPATIENT)
Dept: LAB | Facility: HOSPITAL | Age: 73
End: 2018-08-27
Attending: INTERNAL MEDICINE
Payer: MEDICARE

## 2018-08-27 DIAGNOSIS — Z94.4 LIVER TRANSPLANTED: ICD-10-CM

## 2018-08-27 LAB
ALBUMIN SERPL BCP-MCNC: 3.7 G/DL
ALP SERPL-CCNC: 139 U/L
ALT SERPL W/O P-5'-P-CCNC: 21 U/L
ANION GAP SERPL CALC-SCNC: 9 MMOL/L
AST SERPL-CCNC: 23 U/L
BASOPHILS # BLD AUTO: 0.04 K/UL
BASOPHILS NFR BLD: 0.7 %
BILIRUB SERPL-MCNC: 1.2 MG/DL
BUN SERPL-MCNC: 12 MG/DL
CALCIUM SERPL-MCNC: 9.9 MG/DL
CHLORIDE SERPL-SCNC: 96 MMOL/L
CO2 SERPL-SCNC: 31 MMOL/L
CREAT SERPL-MCNC: 3.9 MG/DL
DIFFERENTIAL METHOD: ABNORMAL
EOSINOPHIL # BLD AUTO: 0.8 K/UL
EOSINOPHIL NFR BLD: 13.9 %
ERYTHROCYTE [DISTWIDTH] IN BLOOD BY AUTOMATED COUNT: 13.9 %
EST. GFR  (AFRICAN AMERICAN): 12.4 ML/MIN/1.73 M^2
EST. GFR  (NON AFRICAN AMERICAN): 10.7 ML/MIN/1.73 M^2
GLUCOSE SERPL-MCNC: 88 MG/DL
HCT VFR BLD AUTO: 36.3 %
HGB BLD-MCNC: 12 G/DL
IMM GRANULOCYTES # BLD AUTO: 0.01 K/UL
IMM GRANULOCYTES NFR BLD AUTO: 0.2 %
LYMPHOCYTES # BLD AUTO: 1.3 K/UL
LYMPHOCYTES NFR BLD: 22.2 %
MCH RBC QN AUTO: 35.4 PG
MCHC RBC AUTO-ENTMCNC: 33.1 G/DL
MCV RBC AUTO: 107 FL
MONOCYTES # BLD AUTO: 0.6 K/UL
MONOCYTES NFR BLD: 9.7 %
NEUTROPHILS # BLD AUTO: 3 K/UL
NEUTROPHILS NFR BLD: 53.3 %
NRBC BLD-RTO: 0 /100 WBC
PLATELET # BLD AUTO: 228 K/UL
PMV BLD AUTO: 9.9 FL
POTASSIUM SERPL-SCNC: 3.5 MMOL/L
PROT SERPL-MCNC: 6.7 G/DL
RBC # BLD AUTO: 3.39 M/UL
SODIUM SERPL-SCNC: 136 MMOL/L
TACROLIMUS BLD-MCNC: 10.6 NG/ML
WBC # BLD AUTO: 5.67 K/UL

## 2018-08-27 PROCEDURE — 80197 ASSAY OF TACROLIMUS: CPT

## 2018-08-27 PROCEDURE — 80053 COMPREHEN METABOLIC PANEL: CPT

## 2018-08-27 PROCEDURE — 36415 COLL VENOUS BLD VENIPUNCTURE: CPT | Mod: PO

## 2018-08-27 PROCEDURE — 85025 COMPLETE CBC W/AUTO DIFF WBC: CPT

## 2018-08-27 NOTE — LETTER
August 27, 2018    Dacia Hamilton  [address] [place] [address]          Dear Dacia Hamilton:  MRN: [id number]    Your lab results were stable.  There are no medicine changes.  Please have your labs drawn again on 10/22/18.      If you cannot have your labs drawn on the scheduled date, it is your responsibility to call the transplant department to reschedule.  To reschedule or make an appointment, please as to speak to or leave a message for my assistant, Kae Hart, at [phone].  When leaving a message for Hailey Pal, or myself, we ask that you leave a brief message regarding your request.    Sincerely,      Lorin Mejia, RN, BSN  Liver Transplant Coordinator  [facility]  [address] [place] [id number] [phone]

## 2018-09-07 DIAGNOSIS — Z94.0 H/O KIDNEY TRANSPLANT: ICD-10-CM

## 2018-09-07 DIAGNOSIS — Z94.4 LIVER TRANSPLANTED: ICD-10-CM

## 2018-09-07 RX ORDER — TACROLIMUS 1 MG/1
2 CAPSULE ORAL EVERY 12 HOURS
Qty: 120 CAPSULE | Refills: 11 | Status: SHIPPED | OUTPATIENT
Start: 2018-09-07 | End: 2019-01-01 | Stop reason: SDUPTHER

## 2018-09-07 NOTE — TELEPHONE ENCOUNTER
----- Message from Mary Banda sent at 9/7/2018  2:18 PM CDT -----  Contact: [facility]  Pharmacy Calling    Reason for call: refill request  Pharmacy Name: [facility]  Prescription Name: Prograf  Phone Number: [phone]  Additional Information: n/a

## 2018-09-11 ENCOUNTER — OFFICE VISIT (OUTPATIENT)
Dept: OPHTHALMOLOGY | Facility: CLINIC | Age: 73
End: 2018-09-11
Payer: MEDICARE

## 2018-09-11 DIAGNOSIS — H43.813 POSTERIOR VITREOUS DETACHMENT OF BOTH EYES: Primary | ICD-10-CM

## 2018-09-11 DIAGNOSIS — Z96.1 PSEUDOPHAKIA OF BOTH EYES: ICD-10-CM

## 2018-09-11 PROCEDURE — 99999 PR PBB SHADOW E&M-EST. PATIENT-LVL II: CPT | Mod: PBBFAC,,, | Performed by: OPHTHALMOLOGY

## 2018-09-11 PROCEDURE — 92014 COMPRE OPH EXAM EST PT 1/>: CPT | Mod: S$PBB,,, | Performed by: OPHTHALMOLOGY

## 2018-09-11 PROCEDURE — 99212 OFFICE O/P EST SF 10 MIN: CPT | Mod: PBBFAC,PO | Performed by: OPHTHALMOLOGY

## 2018-09-11 RX ORDER — LISINOPRIL 20 MG/1
20 TABLET ORAL DAILY
Refills: 5 | COMMUNITY
Start: 2018-08-27 | End: 2019-01-01

## 2018-09-11 NOTE — PATIENT INSTRUCTIONS
Patient was counseled on the following:  IF YOU HAVE:  1. Increase in floaters or flashing lights  2. Worsening vision  3. Appearance of a persistent curtain  4. Shadow anywhere in the field  of vision  Return immediately if these symptoms develop.  There is an increased possibility that you will have the develop floaters and flashes in the opposite eye.

## 2018-09-11 NOTE — PROGRESS NOTES
HPI     Follow-up      Additional comments: F/U PVD OU              Comments     DLS: 7/30/18    Pt states still sees a few floaters but no flashes. + still has a blurred   spot at left side of va OS. States is she puts her finger at the corner of   her eye she does not see it. Uses AT's PRN.           Last edited by Cheryle Quintana on 9/11/2018 10:02 AM. (History)            Assessment /Plan     For exam results, see Encounter Report.    Posterior vitreous detachment of both eyes  Patient was counseled on the following:  IF YOU HAVE:  1. Increase in floaters or flashing lights  2. Worsening vision  3. Appearance of a persistent curtain  4. Shadow anywhere in the field  of vision  Return immediately if these symptoms develop.  There is an increased possibility that you will have the develop floaters and flashes in the opposite eye.  Pseudophakia of both eyes-asyptomatic  observe

## 2018-10-23 NOTE — LETTER
October 23, 2018    Dacia Hamilton  [address] [place] [address]          Dear Dacia Hamilton:  MRN: [id number]    Your lab results were stable.  There are no medicine changes.  Please have your labs drawn again on 1/14/19.      If you cannot have your labs drawn on the scheduled date, it is your responsibility to call the transplant department to reschedule.  To reschedule or make an appointment, please as to speak to or leave a message for my assistant, Kae Hart, at [phone].  When leaving a message for Hailey Pal, or myself, we ask that you leave a brief message regarding your request.    Sincerely,    Lorin Mejia, RN, BSN  Liver Transplant Coordinator  [facility]  [address] [place] [id number] [phone]

## 2018-10-23 NOTE — TELEPHONE ENCOUNTER
Letter sent, labs stable and no medication changes are needed. Repeat labs due 1/14/19 per protocol.

## 2018-11-05 PROBLEM — E03.9 HYPOTHYROIDISM: Status: ACTIVE | Noted: 2018-01-01

## 2018-11-05 NOTE — PROGRESS NOTES
"Subjective:       Patient ID: Dacia Hamilton is a 72 y.o. female.    Chief Complaint: Follow-up (6 mo f/u)    She is here for follow-up.  She had some trouble with elevated liver enzymes suggesting rejection.  Those have resolved since her tacrolimus was adjusted.  Her blood pressure has been running high.  She is on dialysis.  Her nephrologist added lisinopril 20 mg.  It had to be reduced to 10 mg because of episodes of lightheadedness.  Her home blood pressure has been running between 140 and 160.  She was having trouble with constipation.  That seems to be helped by Colace.  She complains of hemorrhoidal bleeding but no pain. Sometimes there is prolapse but not severe.  No chest pain.  Her weight is stable.      Review of Systems   Constitutional: Negative for fever and unexpected weight change.   Respiratory: Negative for chest tightness and shortness of breath.    Cardiovascular: Negative for chest pain, palpitations and leg swelling.   Gastrointestinal: Negative for abdominal pain.       Objective:     Blood pressure (!) 180/76, pulse 64, temperature 98.1 °F (36.7 °C), temperature source Oral, height 5' 5.5" (1.664 m), weight 60.4 kg (133 lb 2.5 oz).      Physical Exam   Constitutional: She appears well-developed and well-nourished. No distress.   Cardiovascular: Normal rate, regular rhythm and normal heart sounds.   No murmur heard.  Pulmonary/Chest: Effort normal and breath sounds normal. No respiratory distress.   Abdominal: She exhibits no distension. There is no tenderness.   Musculoskeletal: She exhibits no edema.   Neurological: She is alert.       Assessment:       1. Secondary hypertension    2. Hypothyroidism due to acquired atrophy of thyroid    3. End stage renal disease    4. Non-rheumatic mitral regurgitation        Plan:       Her blood pressure is uncontrolled.  Add amlodipine 2.5 mg.  Resume lisinopril 20 mg.  Follow up with me in 1 month.  Lab work today.  Her hemorrhoid symptoms do not seem " to be serious enough to warrant surgical consult at this point.

## 2018-12-06 PROBLEM — I70.0 ARTERIOSCLEROSIS OF ABDOMINAL AORTA: Status: ACTIVE | Noted: 2018-01-01

## 2018-12-06 NOTE — PROGRESS NOTES
"Subjective:       Patient ID: Dacia Hamilton is a 73 y.o. female.    Chief Complaint: Shortness of Breath; Hypertension (recalled medication ); Results; and Back Pain (goes down the right leg; started 2 weeks ago )    She is here with her daughter.  She is on chronic dialysis for renal failure.  For the past 2 weeks she has had some upper respiratory symptoms with coughing which is sometimes productive but usually dry.  Also fatigue.  During the past 2 days she is had some heaviness in her chest and feels more short of breath.  She was given supplemental oxygen at the dialysis clinic yesterday.  They did not check her oxygen saturation.  Her brother  suddenly of a heart attack 3 weeks ago.  She had a past history of a right pleural effusion being drained in the hospital in 2017.  She saw Dr. Garcia at the time.  She is worried about her heart.  I reviewed heart test that she had in 2017 with a fairly good ejection fraction.  She does have moderate tricuspid and mitral regurgitation.  She had a heart catheterization that did not show any critical lesions.      Review of Systems   Constitutional: Positive for fatigue. Negative for chills, fever and unexpected weight change.   Respiratory: Positive for cough, chest tightness and shortness of breath.    Cardiovascular: Negative for chest pain, palpitations and leg swelling.   Gastrointestinal: Positive for diarrhea (She had self-limited diarrhea earlier this week.).   Musculoskeletal: Positive for back pain ( back pain in the upper lumbar area with radiation down the right leg.  Some numbness and tingling.  Duration 1 week.  No trauma.).       Objective:     Blood pressure (!) 166/84, pulse 75, resp. rate 16, height 5' 5.5" (1.664 m), weight 58.3 kg (128 lb 8.5 oz).      Physical Exam   Constitutional: She appears well-developed and well-nourished. She appears distressed (Mild distress and seems to be worried.).   Cardiovascular: Normal rate and intact " distal pulses.   Murmur heard.  She does have some abnormal internal jugular pulse a shin that is most noticeable on the left.  Mildly distended veins   Pulmonary/Chest: Effort normal and breath sounds normal.   Possibly some dullness in the lower right chest to percussion   Abdominal: Soft. Bowel sounds are normal. There is no tenderness.   Musculoskeletal: She exhibits no edema.   Neurological: She is alert.       Assessment:       1. Secondary hypertension    2. Cough    3. Upper respiratory tract infection, unspecified type    4. CKD (chronic kidney disease) stage 5, GFR less than 15 ml/min    5. Dyspnea, unspecified type    6. Low back pain, non-specific    7. Recurrent right pleural effusion        Plan:       I reviewed her chest x-ray.  It shows a right pleural effusion.  I will send her for thoracentesis and also for her to arrange an appointment to follow up with Dr. Garcia.  Change amlodipine to Procardia XL 30 mg.  Her blood pressure is not well controlled.  Her amlodipine has apparently been recalled.    continue lisinopril 20 mg.  Follow up with me in 1 month.  Cheratussin syrup to help her with her cough.  I reviewed a back x-ray.  We will continue to monitor her back pain.

## 2018-12-06 NOTE — TELEPHONE ENCOUNTER
----- Message from Vinicius GALVIN Michael sent at 12/6/2018 10:43 AM CST -----  Contact: [facility]/Dr. Henderson's office/Daina Lama called in and stated she needs some clarification on the referral they received for the attached patient.  Daina's call back number is [phone]

## 2018-12-06 NOTE — TELEPHONE ENCOUNTER
Advised that Dr Woods had ordered thoracentesis and at there discretion as to when to see the patient.

## 2018-12-10 NOTE — TELEPHONE ENCOUNTER
Pt is having a procedure on 12-, pt would like to know about stopping blood thinners prior. Pt has not taken blood thinners today. Please advise.

## 2018-12-10 NOTE — TELEPHONE ENCOUNTER
----- Message from Mirian Ochoa sent at 12/10/2018 10:36 AM CST -----  Contact: pt  ..Type: Needs Medical Advice    Who Called: pt  Best Call Back Number:[phone]  Additional Information: pt would like to speak with a nurse in regards to needing orders to stop  her blood thinners before her upcoming surgery.  Please call to advise  Thanks

## 2018-12-13 PROBLEM — R06.00 DYSPNEA: Status: ACTIVE | Noted: 2018-01-01

## 2019-01-01 ENCOUNTER — CLINICAL SUPPORT (OUTPATIENT)
Dept: REHABILITATION | Facility: HOSPITAL | Age: 74
End: 2019-01-01
Payer: MEDICARE

## 2019-01-01 ENCOUNTER — TELEPHONE (OUTPATIENT)
Dept: SURGERY | Facility: HOSPITAL | Age: 74
End: 2019-01-01

## 2019-01-01 ENCOUNTER — HOSPITAL ENCOUNTER (OUTPATIENT)
Dept: RADIOLOGY | Facility: HOSPITAL | Age: 74
Discharge: HOME | End: 2019-07-16
Attending: FAMILY MEDICINE
Payer: MEDICARE

## 2019-01-01 ENCOUNTER — CLINICAL SUPPORT (OUTPATIENT)
Dept: REHABILITATION | Facility: HOSPITAL | Age: 74
End: 2019-01-01
Attending: FAMILY MEDICINE
Payer: MEDICARE

## 2019-01-01 ENCOUNTER — HOSPITAL ENCOUNTER (EMERGENCY)
Facility: HOSPITAL | Age: 74
Discharge: HOME | End: 2019-02-07
Attending: EMERGENCY MEDICINE
Payer: MEDICARE

## 2019-01-01 ENCOUNTER — HOSPITAL ENCOUNTER (OUTPATIENT)
Dept: RADIOLOGY | Facility: HOSPITAL | Age: 74
Discharge: HOME | End: 2019-08-28
Attending: ANESTHESIOLOGY
Payer: MEDICARE

## 2019-01-01 ENCOUNTER — OFFICE VISIT (OUTPATIENT)
Dept: PAIN MEDICINE | Facility: CLINIC | Age: 74
End: 2019-01-01
Payer: MEDICARE

## 2019-01-01 ENCOUNTER — HOSPITAL ENCOUNTER (EMERGENCY)
Facility: HOSPITAL | Age: 74
Discharge: HOME | End: 2019-09-18
Attending: EMERGENCY MEDICINE
Payer: MEDICARE

## 2019-01-01 ENCOUNTER — TELEPHONE (OUTPATIENT)
Dept: PAIN MEDICINE | Facility: CLINIC | Age: 74
End: 2019-01-01

## 2019-01-01 ENCOUNTER — LAB VISIT (OUTPATIENT)
Dept: LAB | Facility: HOSPITAL | Age: 74
End: 2019-01-01
Attending: INTERNAL MEDICINE
Payer: MEDICARE

## 2019-01-01 ENCOUNTER — OFFICE VISIT (OUTPATIENT)
Dept: OPHTHALMOLOGY | Facility: CLINIC | Age: 74
End: 2019-01-01
Payer: MEDICARE

## 2019-01-01 ENCOUNTER — HOSPITAL ENCOUNTER (OUTPATIENT)
Dept: RADIOLOGY | Facility: HOSPITAL | Age: 74
Discharge: HOME | End: 2019-09-10
Attending: NURSE PRACTITIONER
Payer: MEDICARE

## 2019-01-01 ENCOUNTER — OFFICE VISIT (OUTPATIENT)
Dept: FAMILY MEDICINE | Facility: CLINIC | Age: 74
End: 2019-01-01
Payer: MEDICARE

## 2019-01-01 ENCOUNTER — TELEPHONE (OUTPATIENT)
Dept: TRANSPLANT | Facility: CLINIC | Age: 74
End: 2019-01-01

## 2019-01-01 ENCOUNTER — OFFICE VISIT (OUTPATIENT)
Dept: TRANSPLANT | Facility: CLINIC | Age: 74
End: 2019-01-01
Payer: MEDICARE

## 2019-01-01 ENCOUNTER — HOSPITAL ENCOUNTER (OUTPATIENT)
Facility: HOSPITAL | Age: 74
Discharge: HOME | End: 2019-08-28
Attending: ANESTHESIOLOGY | Admitting: ANESTHESIOLOGY
Payer: MEDICARE

## 2019-01-01 ENCOUNTER — PATIENT MESSAGE (OUTPATIENT)
Dept: FAMILY MEDICINE | Facility: CLINIC | Age: 74
End: 2019-01-01

## 2019-01-01 VITALS
TEMPERATURE: 97 F | WEIGHT: 130.19 LBS | BODY MASS INDEX: 21.97 KG/M2 | HEART RATE: 63 BPM | RESPIRATION RATE: 18 BRPM | SYSTOLIC BLOOD PRESSURE: 134 MMHG | WEIGHT: 128 LBS | RESPIRATION RATE: 20 BRPM | DIASTOLIC BLOOD PRESSURE: 75 MMHG | BODY MASS INDEX: 22.35 KG/M2 | OXYGEN SATURATION: 100 % | DIASTOLIC BLOOD PRESSURE: 57 MMHG | TEMPERATURE: 99 F | SYSTOLIC BLOOD PRESSURE: 108 MMHG | OXYGEN SATURATION: 98 % | HEART RATE: 69 BPM

## 2019-01-01 VITALS
HEIGHT: 65 IN | DIASTOLIC BLOOD PRESSURE: 62 MMHG | BODY MASS INDEX: 20.22 KG/M2 | SYSTOLIC BLOOD PRESSURE: 134 MMHG | WEIGHT: 121.38 LBS | HEART RATE: 68 BPM

## 2019-01-01 VITALS
OXYGEN SATURATION: 98 % | SYSTOLIC BLOOD PRESSURE: 150 MMHG | DIASTOLIC BLOOD PRESSURE: 62 MMHG | HEART RATE: 63 BPM | HEIGHT: 65 IN | BODY MASS INDEX: 20.46 KG/M2 | TEMPERATURE: 98 F | WEIGHT: 122.81 LBS

## 2019-01-01 VITALS
OXYGEN SATURATION: 100 % | TEMPERATURE: 97 F | RESPIRATION RATE: 16 BRPM | HEIGHT: 64 IN | DIASTOLIC BLOOD PRESSURE: 62 MMHG | WEIGHT: 126.31 LBS | SYSTOLIC BLOOD PRESSURE: 127 MMHG | BODY MASS INDEX: 21.56 KG/M2 | HEART RATE: 62 BPM

## 2019-01-01 VITALS
RESPIRATION RATE: 18 BRPM | DIASTOLIC BLOOD PRESSURE: 57 MMHG | BODY MASS INDEX: 21.3 KG/M2 | SYSTOLIC BLOOD PRESSURE: 113 MMHG | OXYGEN SATURATION: 99 % | TEMPERATURE: 96 F | WEIGHT: 124.75 LBS | HEIGHT: 64 IN | HEART RATE: 64 BPM

## 2019-01-01 VITALS
DIASTOLIC BLOOD PRESSURE: 78 MMHG | HEART RATE: 65 BPM | WEIGHT: 123 LBS | OXYGEN SATURATION: 95 % | HEIGHT: 66 IN | TEMPERATURE: 99 F | SYSTOLIC BLOOD PRESSURE: 162 MMHG | RESPIRATION RATE: 16 BRPM | BODY MASS INDEX: 19.77 KG/M2

## 2019-01-01 VITALS
HEIGHT: 64 IN | TEMPERATURE: 98 F | OXYGEN SATURATION: 100 % | DIASTOLIC BLOOD PRESSURE: 74 MMHG | BODY MASS INDEX: 21.17 KG/M2 | SYSTOLIC BLOOD PRESSURE: 159 MMHG | RESPIRATION RATE: 15 BRPM | HEART RATE: 64 BPM | WEIGHT: 124 LBS

## 2019-01-01 DIAGNOSIS — M51.36 DDD (DEGENERATIVE DISC DISEASE), LUMBAR: Primary | ICD-10-CM

## 2019-01-01 DIAGNOSIS — K74.00 HEPATIC FIBROSIS: Primary | ICD-10-CM

## 2019-01-01 DIAGNOSIS — N18.5 CKD (CHRONIC KIDNEY DISEASE) STAGE 5, GFR LESS THAN 15 ML/MIN: ICD-10-CM

## 2019-01-01 DIAGNOSIS — N18.6 END STAGE RENAL DISEASE: ICD-10-CM

## 2019-01-01 DIAGNOSIS — K74.00 HEPATIC FIBROSIS: ICD-10-CM

## 2019-01-01 DIAGNOSIS — M54.5 LOW BACK PAIN, UNSPECIFIED BACK PAIN LATERALITY, UNSPECIFIED CHRONICITY, WITH SCIATICA PRESENCE UNSPECIFIED: Primary | ICD-10-CM

## 2019-01-01 DIAGNOSIS — S80.00XD CONTUSION OF KNEE, UNSPECIFIED LATERALITY, SUBSEQUENT ENCOUNTER: ICD-10-CM

## 2019-01-01 DIAGNOSIS — M70.61 TROCHANTERIC BURSITIS OF RIGHT HIP: ICD-10-CM

## 2019-01-01 DIAGNOSIS — Z94.4 LIVER TRANSPLANTED: ICD-10-CM

## 2019-01-01 DIAGNOSIS — M54.16 LUMBAR RADICULOPATHY: Primary | ICD-10-CM

## 2019-01-01 DIAGNOSIS — M54.41 CHRONIC BILATERAL LOW BACK PAIN WITH RIGHT-SIDED SCIATICA: ICD-10-CM

## 2019-01-01 DIAGNOSIS — M70.61 TROCHANTERIC BURSITIS OF RIGHT HIP: Primary | ICD-10-CM

## 2019-01-01 DIAGNOSIS — M54.31 RIGHT SCIATIC NERVE PAIN: Primary | ICD-10-CM

## 2019-01-01 DIAGNOSIS — Z94.0 S/P KIDNEY TRANSPLANT: ICD-10-CM

## 2019-01-01 DIAGNOSIS — D84.9 IMMUNOSUPPRESSED STATUS: ICD-10-CM

## 2019-01-01 DIAGNOSIS — M54.31 SCIATIC NERVE PAIN, RIGHT: ICD-10-CM

## 2019-01-01 DIAGNOSIS — M54.5 LOW BACK PAIN, UNSPECIFIED BACK PAIN LATERALITY, UNSPECIFIED CHRONICITY, WITH SCIATICA PRESENCE UNSPECIFIED: ICD-10-CM

## 2019-01-01 DIAGNOSIS — T82.9XXA COMPLICATION OF ARTERIOVENOUS DIALYSIS FISTULA, INITIAL ENCOUNTER: Primary | ICD-10-CM

## 2019-01-01 DIAGNOSIS — M47.812 NECK ARTHRITIS: ICD-10-CM

## 2019-01-01 DIAGNOSIS — H43.813 POSTERIOR VITREOUS DETACHMENT OF BOTH EYES: Primary | ICD-10-CM

## 2019-01-01 DIAGNOSIS — R10.9 RIGHT SIDED ABDOMINAL PAIN: Primary | ICD-10-CM

## 2019-01-01 DIAGNOSIS — G89.29 CHRONIC BILATERAL LOW BACK PAIN WITH RIGHT-SIDED SCIATICA: ICD-10-CM

## 2019-01-01 DIAGNOSIS — Z94.4 LIVER TRANSPLANTED: Primary | ICD-10-CM

## 2019-01-01 DIAGNOSIS — M51.36 DDD (DEGENERATIVE DISC DISEASE), LUMBAR: ICD-10-CM

## 2019-01-01 DIAGNOSIS — J41.0 SIMPLE CHRONIC BRONCHITIS: ICD-10-CM

## 2019-01-01 DIAGNOSIS — M54.16 LUMBAR RADICULOPATHY: ICD-10-CM

## 2019-01-01 DIAGNOSIS — Z99.2 DEPENDENCE ON HEMODIALYSIS: ICD-10-CM

## 2019-01-01 DIAGNOSIS — M25.551 PAIN OF RIGHT HIP JOINT: Primary | ICD-10-CM

## 2019-01-01 DIAGNOSIS — Z94.4 LIVER REPLACED BY TRANSPLANT: ICD-10-CM

## 2019-01-01 DIAGNOSIS — Z94.0 H/O KIDNEY TRANSPLANT: ICD-10-CM

## 2019-01-01 DIAGNOSIS — H52.7 REFRACTIVE ERROR: ICD-10-CM

## 2019-01-01 DIAGNOSIS — Z96.1 PSEUDOPHAKIA OF BOTH EYES: ICD-10-CM

## 2019-01-01 DIAGNOSIS — I15.9 SECONDARY HYPERTENSION: ICD-10-CM

## 2019-01-01 DIAGNOSIS — K74.3 PRIMARY BILIARY CHOLANGITIS: ICD-10-CM

## 2019-01-01 DIAGNOSIS — Z94.0 S/P KIDNEY TRANSPLANT: Primary | ICD-10-CM

## 2019-01-01 DIAGNOSIS — N18.6 ESRD (END STAGE RENAL DISEASE): ICD-10-CM

## 2019-01-01 LAB
ABO + RH BLD: NORMAL
ALBUMIN SERPL BCP-MCNC: 3.4 G/DL
ALBUMIN SERPL BCP-MCNC: 3.5 G/DL
ALBUMIN SERPL BCP-MCNC: 3.7 G/DL (ref 3.5–5.2)
ALBUMIN SERPL BCP-MCNC: 4.2 G/DL (ref 3.5–5.2)
ALP SERPL-CCNC: 124 U/L
ALP SERPL-CCNC: 132 U/L
ALP SERPL-CCNC: 162 U/L (ref 55–135)
ALP SERPL-CCNC: 177 U/L (ref 55–135)
ALT SERPL W/O P-5'-P-CCNC: 11 U/L
ALT SERPL W/O P-5'-P-CCNC: 13 U/L
ALT SERPL W/O P-5'-P-CCNC: 21 U/L (ref 10–44)
ALT SERPL W/O P-5'-P-CCNC: 21 U/L (ref 10–44)
ANION GAP SERPL CALC-SCNC: 13 MMOL/L (ref 8–16)
ANION GAP SERPL CALC-SCNC: 15 MMOL/L
ANION GAP SERPL CALC-SCNC: 8 MMOL/L
ANION GAP SERPL CALC-SCNC: 9 MMOL/L (ref 8–16)
APTT BLDCRRT: 26.9 SEC (ref 21–32)
AST SERPL-CCNC: 18 U/L
AST SERPL-CCNC: 18 U/L
AST SERPL-CCNC: 19 U/L (ref 10–40)
AST SERPL-CCNC: 24 U/L (ref 10–40)
BASOPHILS # BLD AUTO: 0 K/UL
BASOPHILS # BLD AUTO: 0.03 K/UL
BASOPHILS # BLD AUTO: 0.03 K/UL (ref 0–0.2)
BASOPHILS # BLD AUTO: 0.04 K/UL (ref 0–0.2)
BASOPHILS # BLD AUTO: 0.06 K/UL (ref 0–0.2)
BASOPHILS NFR BLD: 0.3 %
BASOPHILS NFR BLD: 0.5 %
BASOPHILS NFR BLD: 0.5 % (ref 0–1.9)
BASOPHILS NFR BLD: 0.5 % (ref 0–1.9)
BASOPHILS NFR BLD: 0.8 % (ref 0–1.9)
BILIRUB SERPL-MCNC: 0.6 MG/DL
BILIRUB SERPL-MCNC: 0.7 MG/DL
BILIRUB SERPL-MCNC: 0.7 MG/DL (ref 0.1–1)
BILIRUB SERPL-MCNC: 0.8 MG/DL (ref 0.1–1)
BLD GP AB SCN CELLS X3 SERPL QL: NORMAL
BUN SERPL-MCNC: 20 MG/DL
BUN SERPL-MCNC: 24 MG/DL (ref 8–23)
BUN SERPL-MCNC: 24 MG/DL (ref 8–23)
BUN SERPL-MCNC: 41 MG/DL
CALCIUM SERPL-MCNC: 10 MG/DL (ref 8.7–10.5)
CALCIUM SERPL-MCNC: 9 MG/DL
CALCIUM SERPL-MCNC: 9.7 MG/DL
CALCIUM SERPL-MCNC: 9.7 MG/DL (ref 8.7–10.5)
CHLORIDE SERPL-SCNC: 94 MMOL/L
CHLORIDE SERPL-SCNC: 96 MMOL/L
CHLORIDE SERPL-SCNC: 96 MMOL/L (ref 95–110)
CHLORIDE SERPL-SCNC: 97 MMOL/L (ref 95–110)
CO2 SERPL-SCNC: 25 MMOL/L
CO2 SERPL-SCNC: 29 MMOL/L (ref 23–29)
CO2 SERPL-SCNC: 32 MMOL/L (ref 23–29)
CO2 SERPL-SCNC: 33 MMOL/L
CREAT SERPL-MCNC: 3.6 MG/DL
CREAT SERPL-MCNC: 4.3 MG/DL (ref 0.5–1.4)
CREAT SERPL-MCNC: 4.7 MG/DL (ref 0.5–1.4)
CREAT SERPL-MCNC: 6.9 MG/DL
DIFFERENTIAL METHOD: ABNORMAL
EOSINOPHIL # BLD AUTO: 0.8 K/UL (ref 0–0.5)
EOSINOPHIL # BLD AUTO: 0.8 K/UL (ref 0–0.5)
EOSINOPHIL # BLD AUTO: 0.9 K/UL
EOSINOPHIL # BLD AUTO: 0.9 K/UL
EOSINOPHIL # BLD AUTO: 1 K/UL (ref 0–0.5)
EOSINOPHIL NFR BLD: 10.6 %
EOSINOPHIL NFR BLD: 11.5 % (ref 0–8)
EOSINOPHIL NFR BLD: 15 % (ref 0–8)
EOSINOPHIL NFR BLD: 15.3 %
EOSINOPHIL NFR BLD: 8.9 % (ref 0–8)
ERYTHROCYTE [DISTWIDTH] IN BLOOD BY AUTOMATED COUNT: 12.9 % (ref 11.5–14.5)
ERYTHROCYTE [DISTWIDTH] IN BLOOD BY AUTOMATED COUNT: 13.2 %
ERYTHROCYTE [DISTWIDTH] IN BLOOD BY AUTOMATED COUNT: 13.3 % (ref 11.5–14.5)
ERYTHROCYTE [DISTWIDTH] IN BLOOD BY AUTOMATED COUNT: 14.6 % (ref 11.5–14.5)
ERYTHROCYTE [DISTWIDTH] IN BLOOD BY AUTOMATED COUNT: 15.9 %
EST. GFR  (AFRICAN AMERICAN): 10.9 ML/MIN/1.73 M^2
EST. GFR  (AFRICAN AMERICAN): 13.5 ML/MIN/1.73 M^2
EST. GFR  (AFRICAN AMERICAN): 6 ML/MIN/1.73 M^2
EST. GFR  (AFRICAN AMERICAN): 9.8 ML/MIN/1.73 M^2
EST. GFR  (NON AFRICAN AMERICAN): 11.7 ML/MIN/1.73 M^2
EST. GFR  (NON AFRICAN AMERICAN): 5 ML/MIN/1.73 M^2
EST. GFR  (NON AFRICAN AMERICAN): 8.5 ML/MIN/1.73 M^2
EST. GFR  (NON AFRICAN AMERICAN): 9.5 ML/MIN/1.73 M^2
GLUCOSE SERPL-MCNC: 110 MG/DL
GLUCOSE SERPL-MCNC: 86 MG/DL (ref 70–110)
GLUCOSE SERPL-MCNC: 88 MG/DL (ref 70–110)
GLUCOSE SERPL-MCNC: 91 MG/DL
HCT VFR BLD AUTO: 25.7 %
HCT VFR BLD AUTO: 26 %
HCT VFR BLD AUTO: 35.6 % (ref 37–48.5)
HCT VFR BLD AUTO: 37.8 % (ref 37–48.5)
HCT VFR BLD AUTO: 38 % (ref 37–48.5)
HGB BLD-MCNC: 11.3 G/DL (ref 12–16)
HGB BLD-MCNC: 12.2 G/DL (ref 12–16)
HGB BLD-MCNC: 12.3 G/DL (ref 12–16)
HGB BLD-MCNC: 8.4 G/DL
HGB BLD-MCNC: 8.5 G/DL
IMM GRANULOCYTES # BLD AUTO: 0.01 K/UL
IMM GRANULOCYTES # BLD AUTO: 0.02 K/UL (ref 0–0.04)
IMM GRANULOCYTES # BLD AUTO: 0.02 K/UL (ref 0–0.04)
IMM GRANULOCYTES # BLD AUTO: 0.03 K/UL (ref 0–0.04)
IMM GRANULOCYTES NFR BLD AUTO: 0.2 %
IMM GRANULOCYTES NFR BLD AUTO: 0.3 % (ref 0–0.5)
IMM GRANULOCYTES NFR BLD AUTO: 0.4 % (ref 0–0.5)
INR PPP: 1 (ref 0.8–1.2)
LYMPHOCYTES # BLD AUTO: 1.3 K/UL
LYMPHOCYTES # BLD AUTO: 1.3 K/UL (ref 1–4.8)
LYMPHOCYTES # BLD AUTO: 1.3 K/UL (ref 1–4.8)
LYMPHOCYTES # BLD AUTO: 1.6 K/UL
LYMPHOCYTES # BLD AUTO: 2.7 K/UL (ref 1–4.8)
LYMPHOCYTES NFR BLD: 17.7 %
LYMPHOCYTES NFR BLD: 17.8 % (ref 18–48)
LYMPHOCYTES NFR BLD: 19.5 % (ref 18–48)
LYMPHOCYTES NFR BLD: 23.3 %
LYMPHOCYTES NFR BLD: 30.6 % (ref 18–48)
MCH RBC QN AUTO: 33.6 PG
MCH RBC QN AUTO: 34.1 PG
MCH RBC QN AUTO: 34.4 PG (ref 27–31)
MCH RBC QN AUTO: 35.4 PG (ref 27–31)
MCH RBC QN AUTO: 35.5 PG (ref 27–31)
MCHC RBC AUTO-ENTMCNC: 31.7 G/DL (ref 32–36)
MCHC RBC AUTO-ENTMCNC: 32.3 G/DL (ref 32–36)
MCHC RBC AUTO-ENTMCNC: 32.4 G/DL (ref 32–36)
MCHC RBC AUTO-ENTMCNC: 32.5 G/DL
MCHC RBC AUTO-ENTMCNC: 32.7 G/DL
MCV RBC AUTO: 103 FL
MCV RBC AUTO: 104 FL
MCV RBC AUTO: 107 FL (ref 82–98)
MCV RBC AUTO: 110 FL (ref 82–98)
MCV RBC AUTO: 112 FL (ref 82–98)
MONOCYTES # BLD AUTO: 0.5 K/UL
MONOCYTES # BLD AUTO: 0.7 K/UL (ref 0.3–1)
MONOCYTES # BLD AUTO: 0.7 K/UL (ref 0.3–1)
MONOCYTES # BLD AUTO: 0.8 K/UL
MONOCYTES # BLD AUTO: 0.9 K/UL (ref 0.3–1)
MONOCYTES NFR BLD: 10.4 % (ref 4–15)
MONOCYTES NFR BLD: 10.7 % (ref 4–15)
MONOCYTES NFR BLD: 9 % (ref 4–15)
MONOCYTES NFR BLD: 9.2 %
MONOCYTES NFR BLD: 9.2 %
NEUTROPHILS # BLD AUTO: 2.9 K/UL
NEUTROPHILS # BLD AUTO: 3.6 K/UL (ref 1.8–7.7)
NEUTROPHILS # BLD AUTO: 4.4 K/UL (ref 1.8–7.7)
NEUTROPHILS # BLD AUTO: 4.4 K/UL (ref 1.8–7.7)
NEUTROPHILS # BLD AUTO: 5.5 K/UL
NEUTROPHILS NFR BLD: 49.4 % (ref 38–73)
NEUTROPHILS NFR BLD: 51.5 %
NEUTROPHILS NFR BLD: 54 % (ref 38–73)
NEUTROPHILS NFR BLD: 60.5 % (ref 38–73)
NEUTROPHILS NFR BLD: 62.2 %
NRBC BLD-RTO: 0 /100 WBC
PLATELET # BLD AUTO: 192 K/UL (ref 150–350)
PLATELET # BLD AUTO: 198 K/UL
PLATELET # BLD AUTO: 221 K/UL (ref 150–350)
PLATELET # BLD AUTO: 239 K/UL (ref 150–350)
PLATELET # BLD AUTO: 252 K/UL
PMV BLD AUTO: 10 FL (ref 9.2–12.9)
PMV BLD AUTO: 10.1 FL (ref 9.2–12.9)
PMV BLD AUTO: 10.4 FL
PMV BLD AUTO: 7.8 FL
PMV BLD AUTO: 9.6 FL (ref 9.2–12.9)
POTASSIUM SERPL-SCNC: 3.5 MMOL/L
POTASSIUM SERPL-SCNC: 3.6 MMOL/L (ref 3.5–5.1)
POTASSIUM SERPL-SCNC: 4.3 MMOL/L (ref 3.5–5.1)
POTASSIUM SERPL-SCNC: 4.5 MMOL/L
PROT SERPL-MCNC: 6.1 G/DL
PROT SERPL-MCNC: 6.6 G/DL
PROT SERPL-MCNC: 6.8 G/DL (ref 6–8.4)
PROT SERPL-MCNC: 7.7 G/DL (ref 6–8.4)
PROTHROMBIN TIME: 10.3 SEC (ref 9–12.5)
RBC # BLD AUTO: 2.49 M/UL
RBC # BLD AUTO: 2.49 M/UL
RBC # BLD AUTO: 3.19 M/UL (ref 4–5.4)
RBC # BLD AUTO: 3.46 M/UL (ref 4–5.4)
RBC # BLD AUTO: 3.55 M/UL (ref 4–5.4)
SODIUM SERPL-SCNC: 134 MMOL/L
SODIUM SERPL-SCNC: 137 MMOL/L
SODIUM SERPL-SCNC: 138 MMOL/L (ref 136–145)
SODIUM SERPL-SCNC: 138 MMOL/L (ref 136–145)
TACROLIMUS BLD-MCNC: 3.8 NG/ML
TACROLIMUS BLD-MCNC: 7.1 NG/ML (ref 5–15)
TACROLIMUS BLD-MCNC: 9.1 NG/ML (ref 5–15)
WBC # BLD AUTO: 5.57 K/UL
WBC # BLD AUTO: 6.61 K/UL (ref 3.9–12.7)
WBC # BLD AUTO: 7.24 K/UL (ref 3.9–12.7)
WBC # BLD AUTO: 8.86 K/UL (ref 3.9–12.7)
WBC # BLD AUTO: 8.9 K/UL

## 2019-01-01 PROCEDURE — 85025 COMPLETE CBC W/AUTO DIFF WBC: CPT

## 2019-01-01 PROCEDURE — 64483 NJX AA&/STRD TFRM EPI L/S 1: CPT | Mod: PO | Performed by: ANESTHESIOLOGY

## 2019-01-01 PROCEDURE — 99999 PR PBB SHADOW E&M-EST. PATIENT-LVL III: CPT | Mod: PBBFAC,,, | Performed by: ANESTHESIOLOGY

## 2019-01-01 PROCEDURE — 99499 UNLISTED E&M SERVICE: CPT | Mod: S$GLB,,, | Performed by: ANESTHESIOLOGY

## 2019-01-01 PROCEDURE — 99999 PR PBB SHADOW E&M-EST. PATIENT-LVL III: ICD-10-PCS | Mod: PBBFAC,,, | Performed by: NURSE PRACTITIONER

## 2019-01-01 PROCEDURE — 99214 PR OFFICE/OUTPT VISIT, EST, LEVL IV, 30-39 MIN: ICD-10-PCS | Mod: S$GLB,,, | Performed by: NURSE PRACTITIONER

## 2019-01-01 PROCEDURE — 3288F FALL RISK ASSESSMENT DOCD: CPT | Mod: CPTII,S$GLB,, | Performed by: FAMILY MEDICINE

## 2019-01-01 PROCEDURE — 97010 HOT OR COLD PACKS THERAPY: CPT | Mod: PN

## 2019-01-01 PROCEDURE — 72148 MRI LUMBAR SPINE W/O DYE: CPT | Mod: TC,PO

## 2019-01-01 PROCEDURE — 1101F PT FALLS ASSESS-DOCD LE1/YR: CPT | Mod: CPTII,S$GLB,, | Performed by: ANESTHESIOLOGY

## 2019-01-01 PROCEDURE — 99999 PR PBB SHADOW E&M-EST. PATIENT-LVL III: ICD-10-PCS | Mod: PBBFAC,,, | Performed by: FAMILY MEDICINE

## 2019-01-01 PROCEDURE — 99283 EMERGENCY DEPT VISIT LOW MDM: CPT | Mod: 25

## 2019-01-01 PROCEDURE — 80197 ASSAY OF TACROLIMUS: CPT

## 2019-01-01 PROCEDURE — 25500020 PHARM REV CODE 255: Mod: PO | Performed by: ANESTHESIOLOGY

## 2019-01-01 PROCEDURE — 63600175 PHARM REV CODE 636 W HCPCS: Performed by: NURSE PRACTITIONER

## 2019-01-01 PROCEDURE — 97161 PT EVAL LOW COMPLEX 20 MIN: CPT | Mod: PN

## 2019-01-01 PROCEDURE — 97110 THERAPEUTIC EXERCISES: CPT | Mod: PN

## 2019-01-01 PROCEDURE — 1100F PTFALLS ASSESS-DOCD GE2>/YR: CPT | Mod: CPTII,S$GLB,, | Performed by: FAMILY MEDICINE

## 2019-01-01 PROCEDURE — 99999 PR PBB SHADOW E&M-EST. PATIENT-LVL II: ICD-10-PCS | Mod: PBBFAC,,, | Performed by: OPHTHALMOLOGY

## 2019-01-01 PROCEDURE — 86901 BLOOD TYPING SEROLOGIC RH(D): CPT

## 2019-01-01 PROCEDURE — 36415 COLL VENOUS BLD VENIPUNCTURE: CPT

## 2019-01-01 PROCEDURE — 99999 PR PBB SHADOW E&M-EST. PATIENT-LVL III: CPT | Mod: PBBFAC,,, | Performed by: OPHTHALMOLOGY

## 2019-01-01 PROCEDURE — 1100F PR PT FALLS ASSESS DOC 2+ FALLS/FALL W/INJURY/YR: ICD-10-PCS | Mod: CPTII,S$GLB,, | Performed by: FAMILY MEDICINE

## 2019-01-01 PROCEDURE — 36415 COLL VENOUS BLD VENIPUNCTURE: CPT | Mod: PO

## 2019-01-01 PROCEDURE — 80053 COMPREHEN METABOLIC PANEL: CPT

## 2019-01-01 PROCEDURE — 92014 COMPRE OPH EXAM EST PT 1/>: CPT | Mod: S$GLB,,, | Performed by: OPHTHALMOLOGY

## 2019-01-01 PROCEDURE — 99152 MOD SED SAME PHYS/QHP 5/>YRS: CPT | Mod: ,,, | Performed by: ANESTHESIOLOGY

## 2019-01-01 PROCEDURE — 99999 PR PBB SHADOW E&M-EST. PATIENT-LVL IV: ICD-10-PCS | Mod: PBBFAC,,, | Performed by: ANESTHESIOLOGY

## 2019-01-01 PROCEDURE — 25000003 PHARM REV CODE 250: Mod: PO | Performed by: ANESTHESIOLOGY

## 2019-01-01 PROCEDURE — 85610 PROTHROMBIN TIME: CPT

## 2019-01-01 PROCEDURE — 99214 PR OFFICE/OUTPT VISIT, EST, LEVL IV, 30-39 MIN: ICD-10-PCS | Mod: S$GLB,,, | Performed by: FAMILY MEDICINE

## 2019-01-01 PROCEDURE — 63600175 PHARM REV CODE 636 W HCPCS: Mod: PO | Performed by: ANESTHESIOLOGY

## 2019-01-01 PROCEDURE — 99499 UNLISTED E&M SERVICE: CPT | Mod: S$GLB,,, | Performed by: NURSE PRACTITIONER

## 2019-01-01 PROCEDURE — 97140 MANUAL THERAPY 1/> REGIONS: CPT | Mod: PN

## 2019-01-01 PROCEDURE — 1101F PR PT FALLS ASSESS DOC 0-1 FALLS W/OUT INJ PAST YR: ICD-10-PCS | Mod: CPTII,S$GLB,, | Performed by: NURSE PRACTITIONER

## 2019-01-01 PROCEDURE — 99214 OFFICE O/P EST MOD 30 MIN: CPT | Mod: S$GLB,,, | Performed by: FAMILY MEDICINE

## 2019-01-01 PROCEDURE — 72148 MRI LUMBAR SPINE W/O DYE: CPT | Mod: 26,,, | Performed by: RADIOLOGY

## 2019-01-01 PROCEDURE — 93975 US LIVER TRANSPLANT POST: ICD-10-PCS | Mod: 26,,, | Performed by: RADIOLOGY

## 2019-01-01 PROCEDURE — 99999 PR PBB SHADOW E&M-EST. PATIENT-LVL IV: ICD-10-PCS | Mod: PBBFAC,,, | Performed by: FAMILY MEDICINE

## 2019-01-01 PROCEDURE — 99999 PR PBB SHADOW E&M-EST. PATIENT-LVL III: ICD-10-PCS | Mod: PBBFAC,,, | Performed by: OPHTHALMOLOGY

## 2019-01-01 PROCEDURE — 99999 PR PBB SHADOW E&M-EST. PATIENT-LVL III: ICD-10-PCS | Mod: PBBFAC,,, | Performed by: ANESTHESIOLOGY

## 2019-01-01 PROCEDURE — 92014 PR EYE EXAM, EST PATIENT,COMPREHESV: ICD-10-PCS | Mod: S$GLB,,, | Performed by: OPHTHALMOLOGY

## 2019-01-01 PROCEDURE — 99999 PR PBB SHADOW E&M-EST. PATIENT-LVL II: CPT | Mod: PBBFAC,,, | Performed by: OPHTHALMOLOGY

## 2019-01-01 PROCEDURE — 99499 RISK ADDL DX/OHS AUDIT: ICD-10-PCS | Mod: S$GLB,,, | Performed by: NURSE PRACTITIONER

## 2019-01-01 PROCEDURE — 93975 VASCULAR STUDY: CPT | Mod: 26,,, | Performed by: RADIOLOGY

## 2019-01-01 PROCEDURE — 99499 UNLISTED E&M SERVICE: CPT | Mod: S$GLB,,, | Performed by: FAMILY MEDICINE

## 2019-01-01 PROCEDURE — 99204 OFFICE O/P NEW MOD 45 MIN: CPT | Mod: S$GLB,,, | Performed by: ANESTHESIOLOGY

## 2019-01-01 PROCEDURE — 25000003 PHARM REV CODE 250: Performed by: EMERGENCY MEDICINE

## 2019-01-01 PROCEDURE — 76705 ECHO EXAM OF ABDOMEN: CPT | Mod: 26,59,, | Performed by: RADIOLOGY

## 2019-01-01 PROCEDURE — 76705 US LIVER TRANSPLANT POST: ICD-10-PCS | Mod: 26,59,, | Performed by: RADIOLOGY

## 2019-01-01 PROCEDURE — 3288F PR FALLS RISK ASSESSMENT DOCUMENTED: ICD-10-PCS | Mod: CPTII,S$GLB,, | Performed by: ANESTHESIOLOGY

## 2019-01-01 PROCEDURE — 64483 PR EPIDURAL INJ, ANES/STEROID, TRANSFORAMINAL, LUMB/SACR, SNGL LEVL: ICD-10-PCS | Mod: RT,,, | Performed by: ANESTHESIOLOGY

## 2019-01-01 PROCEDURE — 1101F PT FALLS ASSESS-DOCD LE1/YR: CPT | Mod: CPTII,S$GLB,, | Performed by: NURSE PRACTITIONER

## 2019-01-01 PROCEDURE — 99284 EMERGENCY DEPT VISIT MOD MDM: CPT | Mod: 25

## 2019-01-01 PROCEDURE — 76705 ECHO EXAM OF ABDOMEN: CPT | Mod: TC,PO

## 2019-01-01 PROCEDURE — 64483 NJX AA&/STRD TFRM EPI L/S 1: CPT | Mod: RT,,, | Performed by: ANESTHESIOLOGY

## 2019-01-01 PROCEDURE — 99999 PR PBB SHADOW E&M-EST. PATIENT-LVL III: CPT | Mod: PBBFAC,,, | Performed by: FAMILY MEDICINE

## 2019-01-01 PROCEDURE — 92015 PR REFRACTION: ICD-10-PCS | Mod: S$GLB,,, | Performed by: OPHTHALMOLOGY

## 2019-01-01 PROCEDURE — 99152 PR MOD CONSCIOUS SEDATION, SAME PHYS, 5+ YRS, FIRST 15 MIN: ICD-10-PCS | Mod: ,,, | Performed by: ANESTHESIOLOGY

## 2019-01-01 PROCEDURE — 99999 PR PBB SHADOW E&M-EST. PATIENT-LVL IV: CPT | Mod: PBBFAC,,, | Performed by: FAMILY MEDICINE

## 2019-01-01 PROCEDURE — 99499 RISK ADDL DX/OHS AUDIT: ICD-10-PCS | Mod: S$GLB,,, | Performed by: FAMILY MEDICINE

## 2019-01-01 PROCEDURE — 93975 VASCULAR STUDY: CPT | Mod: TC,PO

## 2019-01-01 PROCEDURE — 3288F FALL RISK ASSESSMENT DOCD: CPT | Mod: CPTII,S$GLB,, | Performed by: ANESTHESIOLOGY

## 2019-01-01 PROCEDURE — 1100F PR PT FALLS ASSESS DOC 2+ FALLS/FALL W/INJURY/YR: ICD-10-PCS | Mod: CPTII,S$GLB,, | Performed by: ANESTHESIOLOGY

## 2019-01-01 PROCEDURE — 1100F PTFALLS ASSESS-DOCD GE2>/YR: CPT | Mod: CPTII,S$GLB,, | Performed by: ANESTHESIOLOGY

## 2019-01-01 PROCEDURE — 92015 DETERMINE REFRACTIVE STATE: CPT | Mod: S$GLB,,, | Performed by: OPHTHALMOLOGY

## 2019-01-01 PROCEDURE — 1101F PR PT FALLS ASSESS DOC 0-1 FALLS W/OUT INJ PAST YR: ICD-10-PCS | Mod: CPTII,S$GLB,, | Performed by: FAMILY MEDICINE

## 2019-01-01 PROCEDURE — 99204 PR OFFICE/OUTPT VISIT, NEW, LEVL IV, 45-59 MIN: ICD-10-PCS | Mod: S$GLB,,, | Performed by: ANESTHESIOLOGY

## 2019-01-01 PROCEDURE — 12001 RPR S/N/AX/GEN/TRNK 2.5CM/<: CPT

## 2019-01-01 PROCEDURE — 99214 OFFICE O/P EST MOD 30 MIN: CPT | Mod: S$GLB,,, | Performed by: NURSE PRACTITIONER

## 2019-01-01 PROCEDURE — 3288F PR FALLS RISK ASSESSMENT DOCUMENTED: ICD-10-PCS | Mod: CPTII,S$GLB,, | Performed by: FAMILY MEDICINE

## 2019-01-01 PROCEDURE — 85730 THROMBOPLASTIN TIME PARTIAL: CPT

## 2019-01-01 PROCEDURE — 1101F PT FALLS ASSESS-DOCD LE1/YR: CPT | Mod: CPTII,S$GLB,, | Performed by: FAMILY MEDICINE

## 2019-01-01 PROCEDURE — 96374 THER/PROPH/DIAG INJ IV PUSH: CPT

## 2019-01-01 PROCEDURE — 99214 OFFICE O/P EST MOD 30 MIN: CPT | Mod: S$GLB,,, | Performed by: ANESTHESIOLOGY

## 2019-01-01 PROCEDURE — 99999 PR PBB SHADOW E&M-EST. PATIENT-LVL IV: CPT | Mod: PBBFAC,,, | Performed by: ANESTHESIOLOGY

## 2019-01-01 PROCEDURE — 76000 FLUOROSCOPY <1 HR PHYS/QHP: CPT | Mod: TC,PO

## 2019-01-01 PROCEDURE — 1101F PR PT FALLS ASSESS DOC 0-1 FALLS W/OUT INJ PAST YR: ICD-10-PCS | Mod: CPTII,S$GLB,, | Performed by: ANESTHESIOLOGY

## 2019-01-01 PROCEDURE — 99214 PR OFFICE/OUTPT VISIT, EST, LEVL IV, 30-39 MIN: ICD-10-PCS | Mod: S$GLB,,, | Performed by: ANESTHESIOLOGY

## 2019-01-01 PROCEDURE — 99499 RISK ADDL DX/OHS AUDIT: ICD-10-PCS | Mod: S$GLB,,, | Performed by: ANESTHESIOLOGY

## 2019-01-01 PROCEDURE — 72148 MRI LUMBAR SPINE WITHOUT CONTRAST: ICD-10-PCS | Mod: 26,,, | Performed by: RADIOLOGY

## 2019-01-01 PROCEDURE — 99999 PR PBB SHADOW E&M-EST. PATIENT-LVL III: CPT | Mod: PBBFAC,,, | Performed by: NURSE PRACTITIONER

## 2019-01-01 RX ORDER — MORPHINE SULFATE 4 MG/ML
4 INJECTION, SOLUTION INTRAMUSCULAR; INTRAVENOUS
Status: COMPLETED | OUTPATIENT
Start: 2019-01-01 | End: 2019-01-01

## 2019-01-01 RX ORDER — SODIUM BICARBONATE 42 MG/ML
INJECTION, SOLUTION INTRAVENOUS
Status: DISCONTINUED | OUTPATIENT
Start: 2019-01-01 | End: 2019-01-01 | Stop reason: HOSPADM

## 2019-01-01 RX ORDER — SODIUM CHLORIDE 9 MG/ML
INJECTION, SOLUTION INTRAVENOUS CONTINUOUS
Status: DISCONTINUED | OUTPATIENT
Start: 2019-01-01 | End: 2019-01-01 | Stop reason: HOSPADM

## 2019-01-01 RX ORDER — URSODIOL 250 MG/1
250 TABLET, FILM COATED ORAL
Qty: 270 TABLET | Refills: 3 | Status: SHIPPED | OUTPATIENT
Start: 2019-01-01

## 2019-01-01 RX ORDER — NIFEDIPINE 30 MG/1
30 TABLET, EXTENDED RELEASE ORAL DAILY
Qty: 90 TABLET | Refills: 3 | Status: SHIPPED | OUTPATIENT
Start: 2019-01-01 | End: 2020-01-03

## 2019-01-01 RX ORDER — SODIUM CHLORIDE, SODIUM LACTATE, POTASSIUM CHLORIDE, CALCIUM CHLORIDE 600; 310; 30; 20 MG/100ML; MG/100ML; MG/100ML; MG/100ML
INJECTION, SOLUTION INTRAVENOUS CONTINUOUS
Status: CANCELLED | OUTPATIENT
Start: 2019-01-01

## 2019-01-01 RX ORDER — ALPRAZOLAM 0.25 MG/1
TABLET ORAL
Qty: 30 TABLET | Refills: 3 | Status: SHIPPED | OUTPATIENT
Start: 2019-01-01

## 2019-01-01 RX ORDER — PANTOPRAZOLE SODIUM 40 MG/1
40 TABLET, DELAYED RELEASE ORAL DAILY
COMMUNITY
End: 2019-01-01 | Stop reason: SDUPTHER

## 2019-01-01 RX ORDER — MIDAZOLAM HYDROCHLORIDE 1 MG/ML
INJECTION INTRAMUSCULAR; INTRAVENOUS
Status: DISCONTINUED | OUTPATIENT
Start: 2019-01-01 | End: 2019-01-01 | Stop reason: HOSPADM

## 2019-01-01 RX ORDER — LIDOCAINE HYDROCHLORIDE 10 MG/ML
1 INJECTION INFILTRATION; PERINEURAL ONCE
Status: COMPLETED | OUTPATIENT
Start: 2019-01-01 | End: 2019-01-01

## 2019-01-01 RX ORDER — SODIUM CHLORIDE, SODIUM LACTATE, POTASSIUM CHLORIDE, CALCIUM CHLORIDE 600; 310; 30; 20 MG/100ML; MG/100ML; MG/100ML; MG/100ML
INJECTION, SOLUTION INTRAVENOUS CONTINUOUS
Status: DISCONTINUED | OUTPATIENT
Start: 2019-01-01 | End: 2019-01-01

## 2019-01-01 RX ORDER — GABAPENTIN 100 MG/1
100 CAPSULE ORAL NIGHTLY
Qty: 30 CAPSULE | Refills: 1 | Status: SHIPPED | OUTPATIENT
Start: 2019-01-01 | End: 2019-01-01 | Stop reason: SDUPTHER

## 2019-01-01 RX ORDER — GABAPENTIN 100 MG/1
CAPSULE ORAL
Qty: 30 CAPSULE | Refills: 3 | Status: SHIPPED | OUTPATIENT
Start: 2019-01-01

## 2019-01-01 RX ORDER — LIDOCAINE HYDROCHLORIDE AND EPINEPHRINE 10; 10 MG/ML; UG/ML
INJECTION, SOLUTION INFILTRATION; PERINEURAL
Status: DISCONTINUED
Start: 2019-01-01 | End: 2019-01-01 | Stop reason: HOSPADM

## 2019-01-01 RX ORDER — PANTOPRAZOLE SODIUM 40 MG/1
TABLET, DELAYED RELEASE ORAL
Qty: 90 TABLET | Refills: 2 | Status: SHIPPED | OUTPATIENT
Start: 2019-01-01 | End: 2019-01-01

## 2019-01-01 RX ORDER — LIDOCAINE HYDROCHLORIDE 10 MG/ML
10 INJECTION INFILTRATION; PERINEURAL
Status: COMPLETED | OUTPATIENT
Start: 2019-01-01 | End: 2019-01-01

## 2019-01-01 RX ORDER — LOSARTAN POTASSIUM 25 MG/1
25 TABLET ORAL DAILY
COMMUNITY

## 2019-01-01 RX ORDER — TACROLIMUS 1 MG/1
2 CAPSULE ORAL EVERY 12 HOURS
Qty: 120 CAPSULE | Refills: 11 | Status: SHIPPED | OUTPATIENT
Start: 2019-01-01

## 2019-01-01 RX ADMIN — LIDOCAINE HYDROCHLORIDE: 10 INJECTION, SOLUTION EPIDURAL; INFILTRATION; INTRACAUDAL; PERINEURAL at 11:08

## 2019-01-01 RX ADMIN — MORPHINE SULFATE 4 MG: 4 INJECTION, SOLUTION INTRAMUSCULAR; INTRAVENOUS at 05:02

## 2019-01-01 RX ADMIN — LIDOCAINE HYDROCHLORIDE 10 ML: 10 INJECTION, SOLUTION INFILTRATION; PERINEURAL at 11:09

## 2019-01-03 PROBLEM — R06.00 DYSPNEA: Status: RESOLVED | Noted: 2018-01-01 | Resolved: 2019-01-01

## 2019-01-03 PROBLEM — J41.0 SIMPLE CHRONIC BRONCHITIS: Status: ACTIVE | Noted: 2019-01-01

## 2019-01-03 NOTE — PROGRESS NOTES
"Subjective:       Patient ID: Dacia Hamilton is a 73 y.o. female.    Chief Complaint: Follow-up    She is here for follow-up of pleural effusion and hypertension and back pain. I saw her a month ago with significant dyspnea.  She was found to have a right pleural effusion.  That had decreased dramatically by the time she went in to have an ultrasound-guided thoracentesis.  A follow-up CT scan showed very minimal effusion.  She has seen Dr. Beatriz Hernández is helping her breathing.  She also received a shot of corticosteroids that gave her a good feeling overall.  Her back hurts last.  It still gives her problem with prolonged sitting      Review of Systems   Constitutional: Positive for fatigue. Negative for fever and unexpected weight change.   Respiratory: Negative for wheezing.    Cardiovascular: Negative for chest pain, palpitations and leg swelling.   Musculoskeletal: Positive for back pain.       Objective:     Blood pressure (!) 150/62, pulse 63, temperature 97.7 °F (36.5 °C), temperature source Oral, height 5' 5" (1.651 m), weight 55.7 kg (122 lb 12.7 oz), SpO2 98 %.      Physical Exam   Constitutional: She appears well-developed and well-nourished. No distress.   Cardiovascular: Normal rate and regular rhythm.   Pulmonary/Chest: Effort normal and breath sounds normal. No respiratory distress.   No dullness to percussion.  Good air movement.   Musculoskeletal:   She has scoliosis and also a pronounced lumbar lordosis.  She has some tenderness at the thoracolumbar junction.  Very good range of motion with flexion extension and side bend.   Neurological: She is alert.       Assessment:       1. S/P kidney transplant    2. Secondary hypertension    3. CKD (chronic kidney disease) stage 5, GFR less than 15 ml/min    4. End stage renal disease        Plan:       We discussed stretching exercises aerobic exercises and resistance exercises to help her back.  Continue present medication.  We changed from amlodipine " to nifedipine at the last visit and she seems to be tolerating it well.  Her blood pressure runs around 150 and her nephrologist likes it there.  She tends to drop lower during her dialysis session.  She now uses Dulera and that helps her breathing.

## 2019-01-03 NOTE — PROGRESS NOTES
Patient Dacia Hamilton, MRN [id number], was dependent on dialysis (ICD10 Z99.2) at the time of this visit on 1/3/19. This addendum is made to the medical record on 01/03/2019.

## 2019-01-22 NOTE — LETTER
January 22, 2019    Dacia Hamilton  [address] [place] [address]          Dear Dacia Hamilton:  MRN: [id number]    This is a follow up to your recent labs, your lab results were stable.  There are no medicine changes.  Please have your labs drawn again on 4/15/19.      If you cannot have your labs drawn on the scheduled date, it is your responsibility to call the transplant department to reschedule.  To reschedule or make an appointment, please as to speak to or leave a message for my assistant, Kae Hart, at [phone].  When leaving a message for Hailey Pal, or myself, we ask that you leave a brief message regarding your request.    Sincerely,    Lorin Mejia, RN, BSN, [facility]  Liver Transplant Coordinator  [facility]  [address] [place] [id number]  [phone]

## 2019-01-22 NOTE — TELEPHONE ENCOUNTER
Letter sent, labs stable and no medication changes are needed. Repeat labs due 4/15/19 per protocol.

## 2019-02-07 NOTE — ED PROVIDER NOTES
Encounter Date: 2/7/2019    SCRIBE #1 NOTE: IKalpana, am scribing for, and in the presence of, ENIO Rosario.       History     Chief Complaint   Patient presents with    Flank Pain     Radiating around to R abdomen today.       Time seen by provider: 4:34 PM on 02/07/2019    Dacia Hamilton is a 73 y.o. female with PMHx of HLD, HTN, CKD, CHF, and PVD who presents to the ED with complaints of right flank pain that started today. She states pain begins in the back and wraps around to the right upper and lower abdomen. She denies left abdominal pain. She mentions having multiple episodes of loose stools this morning but denies diarrhea. She denies having similar pain in the past. Patient is currently a M/W/F dialysis patient. She denies abnormal urinary symptoms. Patient has had multiple liver transplants and a kidney transplant. She denies blood in stool, N/V, chest pain, and SOB. Patient has a history of back pain but denies current pain is similar to past back pain. She no longer has her appendix or gallbladder. She denies fever. Patient is currently on immunosuppressants. She has no other medical concerns or complaints at this moment. SHx includes hepaticojejunostomy, appendectomy, kidney transplant, cholecystectomy, cystoscopy, tubal ligation, liver transplant, and AV fistula placement.       The history is provided by the patient.     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    Bursitis of left hip     Cataract     CHF (congestive heart failure)     Chronic kidney disease     Disorder of kidney and ureter     Encounter for blood transfusion     GERD (gastroesophageal reflux disease)     takes prilosec    Hyperlipidemia     Hypertension      Hypothyroidism     Influenza 2017    Osteoarthritis     Osteoporosis     Peptic ulcer disease     Primary biliary cirrhosis , ,     3 Liver Transplants (first 2 in same mo)    PVD (posterior vitreous detachment), bilateral     Sciatica     Urinary tract infection      Past Surgical History:   Procedure Laterality Date    2 previous liver transplant  1991-     at [place]    APPENDECTOMY      AV FISTULA PLACEMENT Left 2017    BIOPSY, LIVER N/A 2018    Performed by [facility] at [facility] OR 2ND FLR    MCGSXK-YAGFTM-QH BONE MARROW BX N/A 2016    Performed by [facility]ace Headley [facility] at [facility] CATH    CATARACT EXTRACTION BILATERAL W/ ANTERIOR VITRECTOMY      CATARACT EXTRACTION W/  INTRAOCULAR LENS IMPLANT Bilateral      SECTION, CLASSIC      CHOLECYSTECTOMY      COLONOSCOPY  2014    Dr. Sánchez; normal findings per patient report    combined liver-kidney transplant  2006    Primary Biliary Cirrhosis    KNTBTLAQ-NZPDAZP-BP-Arm Left 2017    Performed by Justin Fermin MD at [facility] OR    CYSTOSCOPY      ESOPHAGOGASTRODUODENOSCOPY (EGD) N/A 2017    Per[facility] Jr[facility] at [facility] ENDO    ESOPHAGOGASTRODUODENOSCOPY (EGD) N/A 2017    Performed by Wisam Gibbons Jr., MD at [facility] ENDO    EYE SURGERY      HEART CATH-LEFT N/A 3/3/2017    Performed by Victor Hugo Hitchcock MD at [facility] CATH    hepaticojejunostomy (pretty en Y)  10/2006    KIDNEY TRANSPLANT      25% of Both Kidneys/Third Kidney    LIVER TRANSPLANT      #3 liver transplants     TONSILLECTOMY      TRANSPLANT-KIDNEY N/A 2016    Performed by [facility] OR 2ND FLR    TUBAL LIGATION  1968    UPPER GASTROINTESTINAL ENDOSCOPY  2017    Dr. Gibbons, repeat in 2-3 months     Family History   Problem Relation Age of Onset    Hypertension Mother     Cataracts Mother     Diabetes Mother     Hypertension Father     Cataracts  Father     No Known Problems Sister     Hypertension Brother     No Known Problems Brother     Kidney disease Neg Hx     Colon cancer Neg Hx     Colon polyps Neg Hx     Crohn's disease Neg Hx     Ulcerative colitis Neg Hx     Amblyopia Neg Hx     Blindness Neg Hx     Glaucoma Neg Hx     Macular degeneration Neg Hx     Retinal detachment Neg Hx     Strabismus Neg Hx     Cancer Neg Hx     Stroke Neg Hx     Thyroid disease Neg Hx      Social History     Tobacco Use    Smoking status: Never Smoker    Smokeless tobacco: Never Used   Substance Use Topics    Alcohol use: No    Drug use: No     Review of Systems   Constitutional: Negative for activity change, appetite change, chills and fever.   HENT: Negative for congestion, rhinorrhea and sore throat.    Respiratory: Negative for cough, chest tightness and shortness of breath.    Cardiovascular: Negative for chest pain.   Gastrointestinal: Positive for abdominal pain. Negative for blood in stool, nausea and vomiting.   Genitourinary: Positive for flank pain (right). Negative for dysuria.   Musculoskeletal: Negative for back pain, neck pain and neck stiffness.   Skin: Negative for pallor and rash.   Neurological: Negative for dizziness, syncope, numbness and headaches.   Hematological: Does not bruise/bleed easily.   Psychiatric/Behavioral: The patient is not nervous/anxious.        Physical Exam     Initial Vitals [02/07/19 1627]   BP Pulse Resp Temp SpO2   (!) 181/81 66 16 98.6 °F (37 °C) 99 %      MAP       --         Physical Exam    Nursing note and vitals reviewed.  Constitutional: Vital signs are normal. She appears well-developed and well-nourished. She is not diaphoretic. No distress.   HENT:   Head: Normocephalic and atraumatic.   Eyes: Pupils are equal, round, and reactive to light.   Neck: Neck supple.   Cardiovascular: Normal rate, regular rhythm and intact distal pulses. Exam reveals no gallop and no friction rub.    Murmur heard.  4/6  holosystolic murmur.    Pulmonary/Chest: Effort normal and breath sounds normal. No respiratory distress. She has no wheezes. She has no rhonchi. She has no rales.   Equal, bilateral breath sounds noted without wheezing.    Abdominal: Soft. Normal appearance and bowel sounds are normal. There is tenderness in the right upper quadrant and right lower quadrant. There is CVA tenderness. There is no rigidity, no rebound, no guarding and no tenderness at McBurney's point.   Right CVA tenderness. RUQ and RLQ tenderness.    Neurological: She is alert and oriented to person, place, and time. She has normal strength.   Skin: Skin is warm, dry and intact.   Psychiatric: She has a normal mood and affect. Her speech is normal and behavior is normal.         ED Course   Procedures  Labs Reviewed   CBC W/ AUTO DIFFERENTIAL - Abnormal; Notable for the following components:       Result Value    RBC 2.49 (*)     Hemoglobin 8.4 (*)     Hematocrit 25.7 (*)      (*)     MCH 33.6 (*)     RDW 15.9 (*)     MPV 7.8 (*)     Eos # 0.9 (*)     Lymph% 17.7 (*)     Eosinophil% 10.6 (*)     All other components within normal limits   COMPREHENSIVE METABOLIC PANEL - Abnormal; Notable for the following components:    Sodium 134 (*)     Chloride 94 (*)     BUN, Bld 41 (*)     Creatinine 6.9 (*)     Albumin 3.4 (*)     eGFR if  6 (*)     eGFR if non  5 (*)     All other components within normal limits          Imaging Results          CT Renal Stone Study ABD Pelvis WO (Final result)  Result time 02/07/19 18:25:46    Final result by Vasile Burns MD (02/07/19 18:25:46)                 Impression:      1. Atrophied native kidneys with renal transplant.  No obstructive uropathy.  Vascular calcification versus nonobstructing nephrolith in the left kidney.  2. Wall thickening proximal stomach which could reflect incomplete distension, peptic ulcer disease, gastritis.  3. Small right pleural effusion.  4.  Liver transplant.      Electronically signed by: Vasile Burns  Date:    02/07/2019  Time:    18:25             Narrative:    EXAMINATION:  CT RENAL STONE STUDY ABD PELVIS WO    CLINICAL HISTORY:  Flank pain, stone disease suspected;    TECHNIQUE:  Low dose axial images, sagittal and coronal reformations were obtained from the lung bases to the pubic symphysis.  Contrast was not administered.    COMPARISON:  12/18/2018    FINDINGS:  Minimal bibasilar atelectasis. Heart size normal.  Coronary artery disease.  Right pleural fluid.  Cholecystectomy.    Patient is status post liver transplant and hepaticojejunostomy per chart review.  There is a mild degree of pneumobilia in the left hepatic lobe.  Prominent size of the portal vein.  Native kidneys atrophied with several simple cyst.  There is a 4 mm rounded calcification in the left renal pelvis.  Pancreas atrophy.  Remaining solid abdominal organs are are grossly unremarkable on this noncontrast examination.    There is no enteric contrast which limits bowel assessment.  There is wall thickening along the proximal stomach.  No dilated bowel loops.    Atherosclerosis.  Renal allograft left lower abdominal quadrant with no hydroureteronephrosis.    Atrophied uterus.  Nondistended urinary bladder.  Osteopenia.  Grade 1 anterolisthesis L4 on L5.  Facet degenerative change at several lower lumbar levels.                                 Medical Decision Making:   History:   Old Medical Records: I decided to obtain old medical records.  Differential Diagnosis:   Nephrolithiasis  Obstruction  Muscle strain   Clinical Tests:   Lab Tests: Reviewed and Ordered  Radiological Study: Reviewed and Ordered       APC / Resident Notes:   Patient is a 73 y.o. female who presents to the ED 02/08/2019 who underwent emergent evaluation for right flank pain radiating into her right upper quadrant and right lower quadrant of her abdomen that started today.  Patient has no rigidity or  guarding on exam.  She does have right CVA tenderness and right upper and lower quadrant tenderness. This is resolved in the emergency department with morphine.  Patient has no gallbladder or appendix which rules out acute cholecystitis or appendicitis.  CT renal stone study without acute findings.  I do not think nephrolithiasis.  I do not think abscess or obstruction.  There is no skin changes to her right flank.  ossible gastritis.  Patient currently on PPI.  Patient offered admission for further observation but declines at this time.  Patient's labs are completely unremarkable vital signs normal. Patient instructed to return to emergency department if symptoms return or worsen.  Patient verbalized understanding. Based on my clinical evaluation, I do not appreciate any immediate, emergent, or life threatening condition or etiology that warrants additional workup today and feel that the patient can be discharged with close follow up care. Case discussed with Dr. Rogers who also evalauted patient and who is agreeable to plan of care. Follow up and return precautions discussed; patient verbalized understanding and is agreeable to plan of care. Patient discharged home in stable condition.               Scribe Attestation:   Scribe #1: I performed the above scribed service and the documentation accurately describes the services I performed. I attest to the accuracy of the note.    Attending Attestation:     Physician Attestation Statement for NP/PA:   I have conducted a face to face encounter with this patient in addition to the NP/PA, due to Medical Complexity    Other NP/PA Attestation Additions:      Medical Decision Making: I provided a face to face evaluation of this patient.  I discussed the patient's care with Advanced Practice Clinician.  I reviewed their note and agree with the history, physical, assessment, diagnosis, treatment, and discharge plan provided by the Advanced Practice Clinician. My overall  impression is r flank pain, uncertain etiology.  The patient has been instructed to follow up with their physician or the one provided as well as specific return precautions.          Physician Attestation for Scribe:  Physician Attestation Statement for Scribe #1: I, Cuca Wu, reviewed documentation, as scribed by in my presence, and it is both accurate and complete.     Comments: I, JIMMY Rosario, personally performed the services described in this documentation. All medical record entries made by the scribe were at my direction and in my presence.  I have reviewed the chart and agree that the record reflects my personal performance and is accurate and complete. JIMMY Rosario.  3:04 AM 02/08/2019 e                ED Course as of Feb 08 0304   Thu Feb 07, 2019   1645 BP: (!) 181/81 [EF]   1645 Temp: 98.6 °F (37 °C) [EF]   1645 Temp src: Oral [EF]   1645 Pulse: 66 [EF]   1645 Resp: 16 [EF]   1645 SpO2: 99 % [EF]   1833 CT Renal Stone Study ABD Pelvis WO [EF]   1840 73-year-old female with a history of liver transplant kidney transplant on immune suppressants presents to the emergency room with right-sided abdominal pain radiating into right upper abdomen.  Thoracic spine nontender.  Lumbar spine nontender.  Repeat abdominal exam at this time demonstrates only very mild epigastric tenderness. No rebound and no guarding.  Labs consistent with prior.  Patient is well-appearing and her pain is almost completely resolved with IV morphine.  I did offer her admission to the hospital but she wishes to be discharged home.  No definite indication for admission.  No sign of any surgical emergency at this time.  Unclear etiology of her abdominal pain.  No sign of zoster.  No evidence of perforation no evidence of bleeding.  Questionable gastritis versus ulcer on CT.  Patient is on Protonix.  Return to the ER if symptoms worsen or change.  [EF]      ED Course User Index  [EF] Harshal Rogers MD     Clinical  Impression:   The encounter diagnosis was Right sided abdominal pain.      Disposition:   Disposition: Discharged  Condition: Stable                        Cuca Wu NP  02/08/19 0303       Harshal Rogers MD  02/11/19 9531

## 2019-02-08 NOTE — ED NOTES
Pt in room 5 for evaluation of abd and flank pain.  Pt is awake, alert and oriented. Resp even and unlabored. Abd soft and tender to right upper and lower quads. Right flank tenderness noted. Pt denies chest pain or sob.  Pt has fistula to left arm.

## 2019-02-12 NOTE — PROGRESS NOTES
HPI     Blurred Vision      Additional comments: blurred va at distance trouble seeing while   driving, seeing signs x a little while///              Comments     blurred va at distance trouble seeing while driving, seeing signs x a   little while//  Flashes on the sides of eyes OS>OD//  Pos for floaters   OS>OD , spider webs//          Last edited by Laney Caro on 2/12/2019  9:24 AM. (History)        ROS     Negative for: Constitutional, Gastrointestinal, Neurological, Skin,   Genitourinary, Musculoskeletal, HENT, Endocrine, Cardiovascular, Eyes,   Respiratory, Psychiatric, Allergic/Imm, Heme/Lymph    Last edited by Eric Abad Jr., MD on 2/12/2019 10:18 AM. (History)        Assessment /Plan     For exam results, see Encounter Report.    Posterior vitreous detachment of both eyes  Patient was counseled on the following:  IF YOU HAVE:  1. Increase in floaters or flashing lights  2. Worsening vision  3. Appearance of a persistent curtain  4. Shadow anywhere in the field  of vision  Return immediately if these symptoms develop.  There is an increased possibility that you will have the develop floaters and flashes in the opposite eye.  Pseudophakia of both eyes- stable, observe    Refractive error- Rx for glasses given to patient  Follow-up in about 6 months (around 8/12/2019) for f/u pvd ou.

## 2019-02-12 NOTE — PATIENT INSTRUCTIONS
What Are Flashes and Floaters?  Have you ever seen flashes of light, stars, or streaks that arent really there? A few of these flashes are seen by everyone from time to time. Usually you see them in one eye at a time. Flashes are often caused by the gel filling inside of your eye, called the vitreous, pulling on the retina. The retina is a membrane that lines the inside of your eye.  Floaters look like dark specks, clouds, threads, or spider webs moving through your eyesight. Most people see them once in a while. Floaters may be pieces of gel or other material floating inside your eye. They are usually harmless.      Who Gets Flashes?  As you age or if you are nearsighted, you are more likely to see flashes. Nearsightedness is when you have fuzzy distance vision. Sometimes, flashes are a sign of other eye problems that need care.  Who Gets Floaters?  The older you get, the more likely youll notice floaters. Floaters can also be caused by an eye injury or surgery. People who are very nearsighted may get more floaters. If floaters appear suddenly or greatly increase in number, see your healthcare provider. This may be a sign of an eye problem.  Date Last Reviewed: 5/31/2015 © 2000-2017 [employer]. [address] [place] [address], [place] [address]. All rights reserved. This information is not intended as a substitute for professional medical care. Always follow your healthcare professional's instructions.

## 2019-04-16 NOTE — TELEPHONE ENCOUNTER
Letter sent, labs stable and no medication changes are needed. Repeat labs due 7/15/19 per protocol.

## 2019-07-03 PROBLEM — M47.812 NECK ARTHRITIS: Status: ACTIVE | Noted: 2019-01-01

## 2019-07-03 PROBLEM — M70.61 TROCHANTERIC BURSITIS OF RIGHT HIP: Status: ACTIVE | Noted: 2019-01-01

## 2019-07-03 PROBLEM — M54.16 LUMBAR RADICULOPATHY: Status: ACTIVE | Noted: 2019-01-01

## 2019-07-03 PROBLEM — J90 BILATERAL PLEURAL EFFUSION: Status: RESOLVED | Noted: 2017-01-03 | Resolved: 2019-01-01

## 2019-07-03 NOTE — PROGRESS NOTES
"Subjective:       Patient ID: Dacia Hamilton is a 73 y.o. female.    Chief Complaint: Fall (Pt fell 6/21, see [facility] notes. R leg pain off/on since before fall)    She tripped and fell 3 weeks ago and landed on her knees and elbow.  The knee pain is gradually improving in the elbow pain has resolved.  She has some persistent right lumbar right hip in right leg pain. She has a past history of right sciatica.  I reviewed my note from December of 2018.  She has had progressively more numbness and tingling and also some leg weakness.  It seems that the symptoms got worse since her fall.  She has been using a walker because of the pain and the fear of falling again.  We have to be careful about medications because of her renal transplant status and dialysis status.  She also has neck pain. That has been for years.  She wonders if it is related to sleeping position.  She describes an aching on the left posterior neck.    Review of Systems   Constitutional: Negative for unexpected weight change.   Respiratory: Negative for cough and shortness of breath.    Musculoskeletal: Positive for arthralgias and back pain.        She had a right trochanteric bursal injection by Dr. Barbour a few months ago with not much benefit.   Neurological: Positive for weakness and numbness.       Objective:     Blood pressure 134/62, pulse 68, height 5' 5" (1.651 m), weight 55 kg (121 lb 5.8 oz).      Physical Exam   Constitutional: She appears well-developed and well-nourished. She appears distressed.   Neck:   She has some restriction in neck extension and left rotation and left side bend.  She has some tenderness in the left posterior cervical muscles.   Musculoskeletal:   Tenderness in the mid and lower lumbar spinous processes.  Tender right sacroiliac.  Tender right greater trochanter.  Straight leg raise negative.   Neurological: She is alert.   Patellar reflexes plus-minus.  Achilles absent on the right and plus-minus on the left.  Right " ankle dorsiflexion is palpably weak and right toe rise is slightly weak.  Sensation on the right medial calf is qualitatively less than the left.       Assessment:       1. Right sciatic nerve pain    2. Lumbar radiculopathy    3. Trochanteric bursitis of right hip        Plan:       Lumbar spine MRI.  Physical therapy in [place].  Trial of gabapentin 100 mg at night.  That is a dose compatible with her renal status.  Consider specialty referral if needed.  I gave her hand book about neck pain.

## 2019-07-16 NOTE — TELEPHONE ENCOUNTER
Letter sent, labs stable and no medication changes are needed. Repeat labs due 10/14/19 per protocol.

## 2019-07-22 NOTE — PLAN OF CARE
TIME RECORD    Date: 07/22/2019    Start Time:  0955  Stop Time:  1040    PROCEDURES:    TIMED  Procedure Time Min.    Start:  Stop:     Start:  Stop:     Start:  Stop:     Start:  Stop:          UNTIMED  Procedure Time Min.   EVAL. Start:  Stop:     Start:  Stop:      Total Timed Minutes:    Total Timed Units:    Total Untimed Units:  1  Charges Billed/# of units:  1    OUTPATIENT PHYSICAL THERAPY   PATIENT EVALUATION  Onset Date: Insidious.  Primary Diagnosis:   1. Lumbar radiculopathy     2. Sciatic nerve pain, right     3. Trochanteric bursitis of right hip       Treatment Diagnosis: LBP,rt hip pain.  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  Prior Therapy: No.  Medications: Dacia Hamilton has a current medication list which includes the following prescription(s): alprazolam, calcium carbonate, clopidogrel, folbee plus, folic acid, gabapentin, levothyroxine, lidocaine-prilocaine, losartan, nifedipine, pantoprazole, phenyleph-min oil-petrolatum, propylene glycol/peg 400, tacrolimus, and zolpidem.  Nutrition:  Overweight  History of Present Illness: LBP and rt hip pain started ~ 6/12 ago without trauma.  Prior Level of Function: Independent  Social History: Retired  Place of Residence (Steps/Adaptations): In 1 neal house.  Functional Deficits Leading to  Referral/Nature of Injury: Difficulties with ADLs,functional activities,homemaking.  Patient Therapy Goals: To walk without pain.    Subjective     Dacia Hamilton states .    Pain:  Location: back   Description: Dull, Throbbing, Sharp and Variable  Activities Which Increase Pain: Standing, Bending, Walking, Extension, Flexing, Lifting and Getting out of bed/chair  Activities Which Decrease Pain: relaxation, pain medication, lying down and rest  Pain Scale: 2/10 at best 4/10 now  8/10 at worst    Objective     Posture: Scoliosis.  Palpation: L2-S1 paraspinals,right hip tender.  Sensation: Intact.  DTRs:  Range of Motion/Strength: ROM of L/spine is grossly WFL.  Strength ~ 3/5 in all planes.     Flexibility: Both HS tight.  Gait: Without AD  Analysis: SBA - guarded,flexed hips.  Bed Mobility:Independent  Transfers: Independent  Special Tests: Slump test neg (-). SLR LT;80,RT;80 neg (-)  Other: OSWESTRY 25/50.  Treatment: EVAL    Assessment       Initial Assessment (Pertinent finding, problem list and factors affecting outcome): Pt presents ROM and strength deficits,decreased function due to pain and above listed deficits.  Rehab Potiential: good    Short Term Goals (3 Weeks): 1.Decrease pain x 1 grade. 2.Start HEP.  Long Term Goals (6 Weeks): 1.Pain 0-4/10. 2.Independent HEP. 3.Strength 5-/5.4.Oswestry 18/50.. 5.Restore previous ABHISHEK    Plan     Certification Period: 7/22/19 to 9/22/19.  Recommended Treatment Plan: 2 times per week for 6 weeks: Electrical Stimulation PRN, Gait Training, Manual Therapy, Moist Heat/ Ice, Patient Education, Therapeutic Activites and Therapeutic Exercise  Other Recommendations: HEP.      Therapist: Eduardo Oro, PT    I CERTIFY THE NEED FOR THESE SERVICES FURNISHED UNDER THIS PLAN OF TREATMENT AND WHILE UNDER MY CARE    Physician's comments:  ________________________________________________________________________________________________________________________________________________      Physician's Name: ___________________________________

## 2019-07-24 NOTE — PROGRESS NOTES
"Name: Dacia Hamilton  Clinic Number: [id number]  Date of Treatment: 07/24/2019   Diagnosis:   Encounter Diagnosis   Name Primary?    Trochanteric bursitis of right hip Yes       Time in: 1040  Time Out: 1146  Total Treatment Time: 66      Subjective:    Dacia reports "Sitting in dialysis helps relieve some of the stress in the back, It's not been that bad lately."  Patient reports their pain to be 3/10 on a 0-10 scale with 0 being no pain and 10 being the worst pain imaginable.    Objective  Dacia received therapeutic exercises to develop strength, endurance, ROM, flexibility, posture and core stabilization for 66 minutes including:   NuStep 12' L1 to increased LE strength, endurance and mobility  Calf stretch 3/30s B on 1/2 roll   Hamstring stretch 3/30s R/L  HR/TR 3/10  Mini squat 2/10  Supine ball squeeze 3/10  Supine CLAM RTB 3/10  LTR 20/20  DKTC 20/20  PPT 3/10  Bridging x 25  SLR 3/10    Written Home Exercises Provided: Will assess todays tolerance for therapy then distribute next visit  Pt demo good understanding of the education provided. Dacia demonstrated good return demonstration of activities.     Assessment:   Good tolerance of therex with minimal c/o discomfort.    Pt will continue to benefit from skilled PT intervention. Medical Necessity is demonstrated by:  Fall Risk, Pain limits function of effected part for some activities, Unable to participate fully in daily activities, Requires skilled supervision to complete and progress HEP and Weakness.    Patient is making good progress towards established goals.    Plan:  Continue with established Plan of Care towards PT goals.   "

## 2019-07-30 NOTE — PROGRESS NOTES
Name: Dacia Hamilton  Clinic Number: [id number]  Date of Treatment: 07/30/2019   Diagnosis: No diagnosis found.    Time in: 0905  Time Out: 1000  Total Treatment Time: 55      Subjective:    Dacia reports she is having pain in her low/mid back today.  Patient reports their pain to be 6/10 on a 0-10 scale with 0 being no pain and 10 being the worst pain imaginable.    Objective    Dacia received therapeutic exercises to develop strength, endurance, ROM, flexibility, posture and core stabilization for 45 minutes including:     NuStep 12' L-2 to increased LE strength, endurance and mobility  Calf stretch 3/30s B on 1/2 roll   Hamstring stretch 3/30s R/L  Piriformis stretch 3 x 30 sec  Supine ball squeeze 3/10  Supine CLAM RTB 3/10  LTR 20/20  DKTC 20/20  PPT 3/10  Bridging x 25      Dacia received the following manual therapy techniques: Soft tissue Mobilization were applied to the: R low/mid back for 10 minutes.       Pt demo good understanding of the education provided. Dacia demonstrated good return demonstration of activities.     Assessment:   Pt reports decreased pian and stiffness following manual PT.  Educated pt to apply ice later this afternoon if muscle soreness occurs.    Pt will continue to benefit from skilled PT intervention. Medical Necessity is demonstrated by:  Pain limits function of effected part for some activities, Unable to participate fully in daily activities, Requires skilled supervision to complete and progress HEP and Weakness.    Patient is making good progress towards established goals.            Plan:  Continue with established Plan of Care towards PT goals.

## 2019-08-01 PROBLEM — M54.50 LOW BACK PAIN: Status: ACTIVE | Noted: 2019-01-01

## 2019-08-01 NOTE — PROGRESS NOTES
TIME RECORD    Date:  08/01/2019    Start Time:  0900  Stop Time:  0955    PROCEDURES:    TIMED  Procedure Time Min.   TE Start:0900  Stop:0955 55    Start:  Stop:     Start:  Stop:     Start:  Stop:          UNTIMED  Procedure Time Min.    Start:  Stop:     Start:  Stop:      Total Timed Minutes:  55  Total Timed Units:  4  Total Untimed Units:    Charges Billed/# of units:  4      Progress/Current Status    Subjective:     Patient ID: Dacia Hamilton is a 73 y.o. female.  Diagnosis:   1. Low back pain, unspecified back pain laterality, unspecified chronicity, with sciatica presence unspecified       Pain: 7 /10       Objective:     TE for ROM,strength,stabilization,gait.     Assessment:     Ambulating with rollator today.    Patient Education/Response:     Use rollator for safety.    Plans and Goals:     Cont per POC.

## 2019-08-01 NOTE — PROGRESS NOTES
08/01/19 1000   Ankle Exercises   Double Leg Calf Raises Reps/Sets/Weight 30   Standing Dorsiflexion Stretch(Gastroc) Reps/Sets/Hold Time 3x30   Hip Exercises   Isometric Hip Adduction Reps/Sets/Hold Time 30   Knee Exercises   Frontal Squats Reps/Sets/Weight 30   Lumbar Exercises   Posterior Pelvic Tilts  Reps/Sets/Hold Time 30   Single Knee To Chest Stretch  Reps/Sets/Hold Time 3x30   Double Knee To Chest Stretch Reps/Sets/Hold Time 30   Hamsting Stretch Reps/Sets/Hold Time 3x30   Piriformis Stretch Reps/Sets/Hold Time 3x30   Bridging Reps/Sets/Hold Time 30   Crunches  Reps/Sets 30   Lower Trunk Rotation Stretch Reps/Sets/Hold Time 30/30   Additional Exercises   Additional Exercise nustep 12' l2

## 2019-08-05 NOTE — PROGRESS NOTES
"Name: Dacia Hamilton  Clinic Number: [id number]  Date of Treatment: 08/05/2019   Diagnosis:   Encounter Diagnosis   Name Primary?    Low back pain, unspecified back pain laterality, unspecified chronicity, with sciatica presence unspecified Yes       Time in: 1240  Time Out: 1345  Total Treatment Time: 65      Subjective:    Dacia reports "I am just tired today, I am fatigued.  I try not to do this on days I have dialysis."  Patient reports their pain to be 6/10 on a 0-10 scale with 0 being no pain and 10 being the worst pain imaginable.    Objective  Dacia received therapeutic exercises to develop strength, endurance, ROM, flexibility, posture and core stabilization for 65 minutes including:  NuStep 12' L2 to increase endurance, mobility, and strength in LE's  Hamstring stretch 3/30s R/L  Calf stretch on 1/2 roll 3/30s  Standing hip abd on foam oval 2/10  HR/TR 3/10  Mini squat 3/10  PPT with ball squeeze 3/10  Bridges with GTB 3/10  SLR 1# 3/10  LTR 3/10 with tball  DKTC 3/10 with tball  TrA in supine 3/10 with tball  Supine ITB stretch with strap 3/30s R/L    Written Home Exercises Provided: Cont with HEP  Pt demo good understanding of the education provided. Dacia demonstrated good return demonstration of activities.     Assessment:   Pt with good tolerance of therex w/o c/o increase in s/s, frequent sitting rests with standing TE 2* fatigue.    Pt will continue to benefit from skilled PT intervention. Medical Necessity is demonstrated by:  Continued inability to participate in vocational pursuits, Pain limits function of effected part for some activities, Unable to participate fully in daily activities, Requires skilled supervision to complete and progress HEP and Weakness.    Patient is making good progress towards established goals.    Plan:  Continue with established Plan of Care towards PT goals.   "

## 2019-08-07 NOTE — PROGRESS NOTES
08/07/19 1400   Knee Exercises   Frontal Squats Reps/Sets/Weight 30   Lumbar Exercises   Posterior Pelvic Tilts  Reps/Sets/Hold Time 30   Single Knee To Chest Stretch  Reps/Sets/Hold Time 3x30   Double Knee To Chest Stretch Reps/Sets/Hold Time 30   Hamsting Stretch Reps/Sets/Hold Time 3x30   Piriformis Stretch Reps/Sets/Hold Time 3x30   Bridging Reps/Sets/Hold Time 3x10   Lower Trunk Rotation Stretch Reps/Sets/Hold Time 30/30   Additional Exercises   Additional Exercise nustep 12' l3

## 2019-08-07 NOTE — PROGRESS NOTES
TIME RECORD    Date:  08/07/2019    Start Time:  1400  Stop Time:  1445    PROCEDURES:    TIMED  Procedure Time Min.   TE Start:1400  Stop:1445 45    Start:  Stop:     Start:  Stop:     Start:  Stop:          UNTIMED  Procedure Time Min.    Start:  Stop:     Start:  Stop:      Total Timed Minutes:  45  Total Timed Units:  3  Total Untimed Units:    Charges Billed/# of units:  3      Progress/Current Status    Subjective:     Patient ID: Dacia Hamilton is a 73 y.o. female.  Diagnosis:   1. Low back pain, unspecified back pain laterality, unspecified chronicity, with sciatica presence unspecified       Pain: 4 /10  Better today.    Objective:     TE for ROM,strength,stabilization.    Assessment:     Poor endurance. SOBOE.    Patient Education/Response:     Posture ed.    Plans and Goals:     Cont per POC.

## 2019-08-13 NOTE — PROGRESS NOTES
"Name: Dacia Hamilton  Clinic Number: [id number]  Date of Treatment: 08/13/2019   Diagnosis:   Encounter Diagnoses   Name Primary?    Trochanteric bursitis of right hip Yes    Low back pain, unspecified back pain laterality, unspecified chronicity, with sciatica presence unspecified        Time in: 1400  Time Out: 1503  Total Treatment Time: 63      Subjective:    Dacia reports improvement of symptoms, "I fell today, I was getting gas and I went to step over the hose and I fell.  I didn't hurt anything, I got myself up. .  Patient reports their pain to be 0/10 on a 0-10 scale with 0 being no pain and 10 being the worst pain imaginable.    Objective  Dacia received therapeutic exercises to develop strength, endurance, ROM and flexibility for 63 minutes including:    NuStep 12' L2 to increase endurance, mobility, and strength in LE's   Hamstring stretch 3/30s R/L   Piriformis stretch 3/30s R/L   Calf stretch on 1/2 roll 3/30s   Standing hip abd on foam oval 2/10   HR/TR on airex 3/10   Mini squat on airex  3/10   PPT with ball squeeze 3/10   Bridges with GTB 3/10   SLR 1# 3/10   LTR 3/10 with tball   DKTC 3/10 with tball   TrA in supine 3/10 with tball   Supine ITB stretch with strap 3/30s R/L    Written Home Exercises Provided: Cont with HEP  Pt demo good understanding of the education provided. Dacia demonstrated good return demonstration of activities.     Assessment:   Patient progressing well, despite fall earlier.  Patient acknowledged where stepping over gas hose was her error.  Endurance and strength progressing well.    Pt will continue to benefit from skilled PT intervention. Medical Necessity is demonstrated by:  Fall Risk, Pain limits function of effected part for some activities, Requires skilled supervision to complete and progress HEP and Weakness.    Patient is making good progress towards established goals.    Plan:  Continue with established Plan of Care towards PT goals.   "

## 2019-08-15 NOTE — PROGRESS NOTES
"Name: Dacia Hamilton  [facility] Number: [id number]  Date of Treatment: 08/15/2019   Diagnosis:   Encounter Diagnoses   Name Primary?    Trochanteric bursitis of right hip Yes    Low back pain, unspecified back pain laterality, unspecified chronicity, with sciatica presence unspecified          Time in: 0845  Time Out: 0940  Total Treatment Time: 55      Subjective:    Dacia reports improvement of symptoms, decreased pain and "I do feel these exercises are helping."  Patient reports their pain to be 3/10 on a 0-10 scale with 0 being no pain and 10 being the worst pain imaginable.    Objective  Dacia received therapeutic exercises to develop strength, endurance, ROM, flexibility, posture and core stabilization for 55 minutes including:   NuStep 12' L2 to increase endurance, mobility, and strength in LE's  Hamstring stretch 3/30s R/L  Piriformis stretch 3/30s R/L  Calf stretch on 1/2 roll 3/30s  Standing hip abd on foam oval 2/10  HR/TR on airex 3/10  Mini squat on airex  3/10  PPT with ball squeeze 3/10  Bridges with GTB 3/10  SLR 2# 2/10  LTR 3/10 with tball  DKTC 3/10 with tball  TrA in supine 3/10 with tball  Supine ITB stretch with strap 3/30s R/L    Written Home Exercises Provided: Cont with HEP  Pt demo good understanding of the education provided. Dacia demonstrated good return demonstration of activities.     Assessment:   Patient with good tolerance of therex, but decreased reps with increased weight with SLR's    Pt will continue to benefit from skilled PT intervention. Medical Necessity is demonstrated by:  Fall Risk, Continued inability to participate in vocational pursuits, Pain limits function of effected part for some activities, Requires skilled supervision to complete and progress HEP and Weakness.    Patient is making good progress towards established goals.    Plan:  Continue with established Plan of Care towards PT goals.   "

## 2019-08-16 PROBLEM — M54.41 CHRONIC BILATERAL LOW BACK PAIN WITH RIGHT-SIDED SCIATICA: Status: ACTIVE | Noted: 2019-01-01

## 2019-08-16 PROBLEM — G89.29 CHRONIC BILATERAL LOW BACK PAIN WITH RIGHT-SIDED SCIATICA: Status: ACTIVE | Noted: 2019-01-01

## 2019-08-16 NOTE — TELEPHONE ENCOUNTER
This patient is scheduled to have a transforaminal epidural injection on 8/28 and she will need to stop her plavix for 7 days prior. Please advise

## 2019-08-16 NOTE — PROGRESS NOTES
[facility] New Patient Evaluation    Referred by: Dr. Woods  Reason for referral: back pain    CC:   Chief Complaint   Patient presents with    Establish Care    Hip Pain    Low-back Pain      Last 3 PDI Scores 8/16/2019   Pain Disability Index (PDI) 12       HPI:   Dacia Hamilton is a 73 y.o. female who complains of back pain    Onset: 6 months  Inciting Event: none  Progression: since onset, pain is gradually worsening  Current Pain Score: 6/10  Timing: intermittent  Quality: Aching and Numb  Radiation: yes, down the right leg to the great toe  Associated numbness or weakness: yes numbness, yes weakness   Exacerbated by: prolonged standing  Allievated by: rest, sitting  Is Pain Level Acceptable?: No    Previous Therapies:  PT/OT: yes, currently  HEP:   Interventions:   Surgery:  Medications:   - NSAIDS:   - MSK Relaxants:   - TCAs:   - SNRIs:   - Topicals:   - Anticonvulsants: gabapentin 100mg qdaily  - Opioids:     History:    Current Outpatient Medications:     ALPRAZolam (XANAX) 0.25 MG tablet, TAKE 1 TABLET BY MOUTH AS NEEDED, Disp: 30 tablet, Rfl: 3    calcium carbonate (OS-ALBINA) 600 mg calcium (1,500 mg) Tab, Take 2 tablets by mouth once daily. , Disp: , Rfl:     clopidogrel (PLAVIX) 75 mg tablet, Take 75 mg by mouth once daily., Disp: , Rfl:     FOLBEE PLUS 5 mg Tab, TAKE 1 TABLET BY MOUTH AT BEDTIME, Disp: 30 each, Rfl: 6    folic acid (FOLVITE) 400 MCG tablet, Take 800 mcg by mouth once daily. , Disp: , Rfl:     gabapentin (NEURONTIN) 100 MG capsule, TAKE 1 CAPSULE BY MOUTH ONCE DAILY EVERY EVENING, Disp: 30 capsule, Rfl: 3    levothyroxine (SYNTHROID) 75 MCG tablet, Take 1 tablet (75 mcg total) by mouth once daily., Disp: 90 tablet, Rfl: 3    lidocaine-prilocaine (EMLA) cream, APPLY TO AFFECTED AREA (FISTULA) 1 HOUR PRIOR TO HD, Disp: , Rfl: 3    losartan (COZAAR) 25 MG tablet, Take 25 mg by mouth once daily., Disp: , Rfl:     NIFEdipine (PROCARDIA-XL) 30 MG (OSM) 24 hr tablet, Take  1 tablet (30 mg total) by mouth once daily., Disp: 90 tablet, Rfl: 3    pantoprazole (PROTONIX) 40 MG tablet, TAKE 1 TABLET BY MOUTH EVERY DAY BEFORE BREAKFAST, Disp: 90 tablet, Rfl: 2    phenyleph-min oil-petrolatum (PREPARATION H) 0.25-14-74.9 % Oint, Place 1 applicator rectally 4 (four) times daily as needed (hemorrhoids). , Disp: , Rfl:     PROPYLENE GLYCOL/ (SYSTANE, PROPYLENE GLYCOL, OPHT), Place 1 drop into both eyes once daily. , Disp: , Rfl:     tacrolimus (PROGRAF) 1 MG Cap, TAKE 2 CAPSULES (2 MG TOTAL) BY MOUTH EVERY 12 (TWELVE) HOURS., Disp: 120 capsule, Rfl: 11    zolpidem (AMBIEN) 5 MG Tab, Take 1 tablet (5 mg total) by mouth nightly as needed (insomnia). At bedtime, Disp: 30 tablet, Rfl: 5    Past Medical History:   Diagnosis Date    Anemia     BK viruria 2011    Bursitis of left hip     Cataract     OU done//    CHF (congestive heart failure)     Chronic kidney disease     Disorder of kidney and ureter     Encounter for blood transfusion     GERD (gastroesophageal reflux disease)     takes prilosec    Hyperlipidemia     Hypertension     Hypothyroidism     Influenza 2017    Osteoarthritis     Osteoporosis     Peptic ulcer disease     Primary biliary cirrhosis , ,     3 Liver Transplants (first 2 in same mo)    PVD (posterior vitreous detachment), bilateral     Sciatica     Urinary tract infection        Past Surgical History:   Procedure Laterality Date    2 previous liver transplant  1991-     at [place]    APPENDECTOMY      AV FISTULA PLACEMENT Left 2017    BIOPSY, LIVER N/A 2018    Performed by [facility] Provider at [facility] OR [address] [place] [address]    PNIYFU-KDTPFI-FM BONE MARROW BX N/A 2016    [facility] CATH    CATARACT EXTRACTION BILATERAL W/ ANTERIOR VITRECTOMY      CATARACT EXTRACTION W/  INTRAOCULAR LENS IMPLANT Bilateral      SECTION, CLASSIC  1968    CHOLECYSTECTOMY       COLONOSCOPY  12/2014    Dr. 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    Primary Biliary Cirrhosis    ETXNQFFO-DCQTKBF-DF-Arm Left 2/2/2017    Performed by [facility] at [facility] OR    CYSTOSCOPY      ESOPHAGOGASTRODUODENOSCOPY (EGD) N/A 6/29/2017    Per[facility] by Wisam Gibbons Jr[facility] at [facility] ENDO    ESOPHAGOGASTRODUODENOSCOPY (EGD) N/A 4/19/2017    Performed by Wisam Gibbons Jr., MD at [facility] ENDO    EYE SURGERY      HEART CATH-LEFT N/A 3/3/2017    Performed by Victor Hugo Hitchcock MD at [facility] CATH    hepaticojejunostomy (pretty en Y)  10/2006    KIDNEY TRANSPLANT  2006    25% of Both Kidneys/Third Kidney    LIVER TRANSPLANT      #3 liver transplants     TONSILLECTOMY      TRANSPLANT-KIDNEY N/A 2/17/2016    Performed by [facility] at [facility] OR 2ND FLR    TUBAL LIGATION  1968    UPPER GASTROINTESTINAL ENDOSCOPY  04/19/2017    Dr. Gibbons, repeat in 2-3 months    yag cap OD Right        Family History   Problem Relation Age of Onset    Hypertension Mother     Cataracts Mother     Diabetes Mother     Hypertension Father     Cataracts Father     No Known Problems Sister     Hypertension Brother     No Known Problems Brother     Kidney disease Neg Hx     Colon cancer Neg Hx     Colon polyps Neg Hx     Crohn's disease Neg Hx     Ulcerative colitis Neg Hx     Amblyopia Neg Hx     Blindness Neg Hx     Glaucoma Neg Hx     Macular degeneration Neg Hx     Retinal detachment Neg Hx     Strabismus Neg Hx     Cancer Neg Hx     Stroke Neg Hx     Thyroid disease Neg Hx        Social History     Socioeconomic History    Marital status:      Spouse name: Not on file    Number of children: Not on file    Years of education: Not on file    Highest education level: Not on file   Occupational History    Not on file   Social Needs    Financial resource strain: Not on file    Food insecurity:     Worry: Not on file     Inability:  "Not on file    Transportation needs:     Medical: Not on file     Non-medical: Not on file   Tobacco Use    Smoking status: Never Smoker    Smokeless tobacco: Never Used   Substance and Sexual Activity    Alcohol use: No    Drug use: No    Sexual activity: Not on file   Lifestyle    Physical activity:     Days per week: Not on file     Minutes per session: Not on file    Stress: Not on file   Relationships    Social connections:     Talks on phone: Not on file     Gets together: Not on file     Attends Anabaptism service: Not on file     Active member of club or organization: Not on file     Attends meetings of clubs or organizations: Not on file     Relationship status: Not on file   Other Topics Concern    Not on file   Social History Narrative    Not on file       Review of patient's allergies indicates:   Allergen Reactions    Prolia [denosumab] Other (See Comments)     Numbness and low calcium    Banana Nausea Only    Flonase [fluticasone]      Local blisters    Tramadol      Dizziness w/ N/V       Review of Systems:  General ROS: negative for - fever  Psychological ROS: negative for - hostility  Hematological and Lymphatic ROS: negative for - bleeding problems, on plavix  Endocrine ROS: negative for - unexpected weight changes  Respiratory ROS: no cough, shortness of breath, or wheezing  Cardiovascular ROS: no chest pain or dyspnea on exertion  Gastrointestinal ROS: no abdominal pain, change in bowel habits, or black or bloody stools  Musculoskeletal ROS: positive for - muscular weakness  Neurological ROS: positive for - numbness/tingling  negative for - bowel and bladder control changes  Dermatological ROS: negative for rash    Physical Exam:  Vitals:    08/16/19 0949   BP: (!) 113/57   Pulse: 64   Resp: 18   Temp: 96.4 °F (35.8 °C)   TempSrc: Oral   SpO2: 99%   Weight: 56.6 kg (124 lb 12.5 oz)   Height: 5' 4" (1.626 m)   PainSc:   6   PainLoc: Hip     Body mass index is 21.42 kg/m².     Gen: " NAD  Gait: gait intact  Psych:  Mood appropriate for given condition  HEENT: eyes anicteric   GI: Abd soft  CV: RRR  Lungs: breathing unlabored   ROM: limited AROM of the L spine in all planes, full ROM at ankles, knees and hips  Lumbar flexion 90 degrees, extension 50 degrees, side bending 30 degrees.    Sensation: intact to light touch in all dermatomes tested from L2-S1 bilaterally  Reflexes: 1+ b/l patella and 0/0 b/l Achilles, biceps and triceps, plantar response down going, no ankle clonus  Palpation: Diffusely tender over lumbar paraspinals  -TTP over the b/l greater trochanters and bilateral SI joint  Tone: normal in the b/l knees and hips   Skin: intact  Extremities: No edema in b/l ankles or hands  Provacative tests: - SLR, + b/l axial facet loading       Right Left   L2/3 Iliacus Hip flexion  5  5   L3/4 Qudratus Femoris Knee Extension  5  5   L4/5 Tib Anterior Ankle Dorsiflexion   5  5   L5/S1 Extensor Hallicus Longus Great toe extension  5-  5   L4/5 Tib Anterior/Posterior Inversion  5  5   L5/S1 Extensor Digitorum Longus, Peronues Eversion  5  5   S1/S2 Gastroc/Soleus Plantar Flexion  5  5       Imaging:  MRI lumbar spine 7/16/19  FINDINGS:  There is mild lumbar scoliosis convex to the right.  No spondylolisthesis is seen.  The vertebral bodies are intact without evidence of fracture or compression.  There are however significant bone marrow changes identified in L4 and L5 adjacent to the disc space consistent with degenerative disc disease.  There is also significant disc space narrowing identified at L4-5.  There is decreased MRI signal from all the lumbar disc consistent with desiccation.    At L2-3 there is hypertrophy of the facets and ligamentum flavum and short pedicles with mild spinal canal stenosis.  At L3-4 there is more prominent asymmetric hypertrophy of the facets and ligamentum flavum and short pedicles which produces moderate spinal canal stenosis and right greater than left neural  foraminal stenosis.  At L4-5 there is circumferential disc protrusion which protrudes more significantly to the right as well as short pedicles and hypertrophy of the facets which produces severe spinal canal and right greater than left neural foraminal stenosis.  At L5-S1 stenosis is not seen.    Labs:  BMP  Lab Results   Component Value Date     07/15/2019    K 4.3 07/15/2019    CL 96 07/15/2019    CO2 29 07/15/2019    BUN 24 (H) 07/15/2019    CREATININE 4.7 (H) 07/15/2019    CALCIUM 9.7 07/15/2019    ANIONGAP 13 07/15/2019    ESTGFRAFRICA 9.8 (A) 07/15/2019    EGFRNONAA 8.5 (A) 07/15/2019     Lab Results   Component Value Date    ALT 21 07/15/2019    AST 19 07/15/2019    GGT 25 09/02/2014    ALKPHOS 177 (H) 07/15/2019    BILITOT 0.8 07/15/2019       Assessment:  Problem List Items Addressed This Visit        Neuro    Lumbar radiculopathy - Primary       Orthopedic    Chronic bilateral low back pain with right-sided sciatica          Treatment Plan:  73 y.o. year old female with PMH ESRD s/p kidney transplant currently on dialyiss, primary biliary cholangitis s/p OLT x2, HTN, presents to the office with back pain.  She has had the pain for about the past 6 months.  Today her pain is 6/10, intermittent, aching, numb, radiating down her right leg to her right big toe.  + numbness, + weakness, no bowel/bladder dysfunction.  Her pain is worse with prolonged standing and relived with rest and sitting.  She currently takes gabapentin qhs every other day with mild relief.  She avoids NSAIDs 2/2 to renal/liver history.  She has tried and failed tramadol in the past.  She is currently doing PT without a significant improvement.  On exam she has 5-/5 right EHL, 1+ b/l patellar, 0/0 b/l achilles, sensation intact to light touch b/l L2-S1, + b/l axial facet loading.  Reviewed her MRI lumbar spine today in the office, she has b/l multilevel facet arthropathy, L4-5 circumferential disc protrusion which protrudes more  significantly to the right which produces severe spinal canal and right greater than left neural foraminal stenosis.  I think her pain is predominantly related to the disc and NFS at L4-5, with some overlapping axial back pain from her facets.  Her pain is limiting her mobility and interfering with her ADL's.  Will schedule for right L4/5 TFESI.  Discussed will need clearance to hold plavix for 7 days and also discussed her increased risk of infection given immunosuppression to prevent transplant rejection.  She will follow up 2 weeks post procedure.    Procedures: right L4/5 TFESI.  Procedure explained using an anatomical model.  Risks, benefits, alternatives explained to patient who verbalized understanding, including increased risk of infection, bleeding, need for additional procedures or surgery, and nerve damage.  Questions regarding the procedure, risks, expected outcome, and possible side effects were solicited and answered to the patient's satisfaction.  Dacia wishes to proceed with the injection.  Verbal and written consent were obtained in clinic today.  PT/OT/HEP: continue PT and HEP  Medications: increase gabapentin to 100mg qhs.  She has failed tramadol in the past and is not interested in trying it again.  Will defer tylenol with codeine given liver history.   Labs: Reviewed and medications are appropriately dosed for current hepatorenal function.  Imaging: No additional recommended at this time.    : Not applicable    Joao Campbell M.D.  Interventional Pain Medicine / Anesthesiology

## 2019-08-16 NOTE — LETTER
August 16, 2019      David Woods MD  [address]  [facility] [address]           [facility]  [address] [facility] [address] [place] [address]  Phone: [phone]  Fax: [phone]          Patient: Dacia Hamilton   MR Number: [id number]   YOB: 1945   Date of Visit: 8/16/2019       Dear Dr. David Woods:    Thank you for referring Dacia Hamilton to me for evaluation. Attached you will find relevant portions of my assessment and plan of care.    If you have questions, please do not hesitate to call me. I look forward to following Dacia Hamilton along with you.    Sincerely,    Joao Campbell MD    Enclosure  CC:  No Recipients    If you would like to receive this communication electronically, please contact [email or web address] or [phone] to request more information on [employer] access.    For providers and/or their staff who would like to refer a patient to [facility], please contact us through our one-stop-shop provider referral line, [facility], at [phone].    If you feel you have received this communication in error or would no longer like to receive these types of communications, please e-mail [email or web address]

## 2019-08-16 NOTE — H&P (VIEW-ONLY)
[facility] New Patient Evaluation    Referred by: Dr. Woods  Reason for referral: back pain    CC:   Chief Complaint   Patient presents with    Establish Care    Hip Pain    Low-back Pain      Last 3 PDI Scores 8/16/2019   Pain Disability Index (PDI) 12       HPI:   Dacia Hamilton is a 73 y.o. female who complains of back pain    Onset: 6 months  Inciting Event: none  Progression: since onset, pain is gradually worsening  Current Pain Score: 6/10  Timing: intermittent  Quality: Aching and Numb  Radiation: yes, down the right leg to the great toe  Associated numbness or weakness: yes numbness, yes weakness   Exacerbated by: prolonged standing  Allievated by: rest, sitting  Is Pain Level Acceptable?: No    Previous Therapies:  PT/OT: yes, currently  HEP:   Interventions:   Surgery:  Medications:   - NSAIDS:   - MSK Relaxants:   - TCAs:   - SNRIs:   - Topicals:   - Anticonvulsants: gabapentin 100mg qdaily  - Opioids:     History:    Current Outpatient Medications:     ALPRAZolam (XANAX) 0.25 MG tablet, TAKE 1 TABLET BY MOUTH AS NEEDED, Disp: 30 tablet, Rfl: 3    calcium carbonate (OS-ALBINA) 600 mg calcium (1,500 mg) Tab, Take 2 tablets by mouth once daily. , Disp: , Rfl:     clopidogrel (PLAVIX) 75 mg tablet, Take 75 mg by mouth once daily., Disp: , Rfl:     FOLBEE PLUS 5 mg Tab, TAKE 1 TABLET BY MOUTH AT BEDTIME, Disp: 30 each, Rfl: 6    folic acid (FOLVITE) 400 MCG tablet, Take 800 mcg by mouth once daily. , Disp: , Rfl:     gabapentin (NEURONTIN) 100 MG capsule, TAKE 1 CAPSULE BY MOUTH ONCE DAILY EVERY EVENING, Disp: 30 capsule, Rfl: 3    levothyroxine (SYNTHROID) 75 MCG tablet, Take 1 tablet (75 mcg total) by mouth once daily., Disp: 90 tablet, Rfl: 3    lidocaine-prilocaine (EMLA) cream, APPLY TO AFFECTED AREA (FISTULA) 1 HOUR PRIOR TO HD, Disp: , Rfl: 3    losartan (COZAAR) 25 MG tablet, Take 25 mg by mouth once daily., Disp: , Rfl:     NIFEdipine (PROCARDIA-XL) 30 MG (OSM) 24 hr tablet, Take  1 tablet (30 mg total) by mouth once daily., Disp: 90 tablet, Rfl: 3    pantoprazole (PROTONIX) 40 MG tablet, TAKE 1 TABLET BY MOUTH EVERY DAY BEFORE BREAKFAST, Disp: 90 tablet, Rfl: 2    phenyleph-min oil-petrolatum (PREPARATION H) 0.25-14-74.9 % Oint, Place 1 applicator rectally 4 (four) times daily as needed (hemorrhoids). , Disp: , Rfl:     PROPYLENE GLYCOL/ (SYSTANE, PROPYLENE GLYCOL, OPHT), Place 1 drop into both eyes once daily. , Disp: , Rfl:     tacrolimus (PROGRAF) 1 MG Cap, TAKE 2 CAPSULES (2 MG TOTAL) BY MOUTH EVERY 12 (TWELVE) HOURS., Disp: 120 capsule, Rfl: 11    zolpidem (AMBIEN) 5 MG Tab, Take 1 tablet (5 mg total) by mouth nightly as needed (insomnia). At bedtime, Disp: 30 tablet, Rfl: 5    Past Medical History:   Diagnosis Date    Anemia     BK viruria 2011    Bursitis of left hip     Cataract     OU done//    CHF (congestive heart failure)     Chronic kidney disease     Disorder of kidney and ureter     Encounter for blood transfusion     GERD (gastroesophageal reflux disease)     takes prilosec    Hyperlipidemia     Hypertension     Hypothyroidism     Influenza 2017    Osteoarthritis     Osteoporosis     Peptic ulcer disease     Primary biliary cirrhosis , ,     3 Liver Transplants (first 2 in same mo)    PVD (posterior vitreous detachment), bilateral     Sciatica     Urinary tract infection        Past Surgical History:   Procedure Laterality Date    2 previous liver transplant  1991-     at [place]    APPENDECTOMY      AV FISTULA PLACEMENT Left 2017    BIOPSY, LIVER N/A 2018    Performed by [facility] Diagnostic Provider at [facility] OR [address] [place] [address]    BYTSYN-YIMLUJ-IO BONE MARROW BX N/A 2016    Performed by Jace Headley MD at [facility] CATH    CATARACT EXTRACTION BILATERAL W/ ANTERIOR VITRECTOMY      CATARACT EXTRACTION W/  INTRAOCULAR LENS IMPLANT Bilateral      SECTION, CLASSIC  1968    CHOLECYSTECTOMY       COLONOSCOPY  12/2014    Dr. 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    Primary Biliary Cirrhosis    GLQMGLAX-OKKRIVD-DS-Arm Left 2/2/2017    Performed by Justin Fermin MD at [facility] OR    CYSTOSCOPY      ESOPHAGOGASTRODUODENOSCOPY (EGD) N/A 6/29/2017    Performed by Wisam Gibbons Jr., [facility] at [facility] ENDO    ESOPHAGOGASTRODUODENOSCOPY (EGD) N/A 4/19/2017    Performed by Wisam Gibbons Jr., MD at [facility] ENDO    EYE SURGERY      HEART CATH-LEFT N/A 3/3/2017    Performed by Victor Hugo Hitchcock MD at [facility] CATH    hepaticojejunostomy (pretty en Y)  10/2006    KIDNEY TRANSPLANT  2006    25% of Both Kidneys/Third Kidney    LIVER TRANSPLANT      #3 liver transplants     TONSILLECTOMY      TRANSPLANT-KIDNEY N/A 2/17/2016    Performed by [facility] Diagnostic Provider at [facility] OR 2ND FLR    TUBAL LIGATION  1968    UPPER GASTROINTESTINAL ENDOSCOPY  04/19/2017    Dr. Gibbons, repeat in 2-3 months    yag cap OD Right        Family History   Problem Relation Age of Onset    Hypertension Mother     Cataracts Mother     Diabetes Mother     Hypertension Father     Cataracts Father     No Known Problems Sister     Hypertension Brother     No Known Problems Brother     Kidney disease Neg Hx     Colon cancer Neg Hx     Colon polyps Neg Hx     Crohn's disease Neg Hx     Ulcerative colitis Neg Hx     Amblyopia Neg Hx     Blindness Neg Hx     Glaucoma Neg Hx     Macular degeneration Neg Hx     Retinal detachment Neg Hx     Strabismus Neg Hx     Cancer Neg Hx     Stroke Neg Hx     Thyroid disease Neg Hx        Social History     Socioeconomic History    Marital status:      Spouse name: Not on file    Number of children: Not on file    Years of education: Not on file    Highest education level: Not on file   Occupational History    Not on file   Social Needs    Financial resource strain: Not on file    Food insecurity:     Worry: Not on file     Inability:  "Not on file    Transportation needs:     Medical: Not on file     Non-medical: Not on file   Tobacco Use    Smoking status: Never Smoker    Smokeless tobacco: Never Used   Substance and Sexual Activity    Alcohol use: No    Drug use: No    Sexual activity: Not on file   Lifestyle    Physical activity:     Days per week: Not on file     Minutes per session: Not on file    Stress: Not on file   Relationships    Social connections:     Talks on phone: Not on file     Gets together: Not on file     Attends Yazidi service: Not on file     Active member of club or organization: Not on file     Attends meetings of clubs or organizations: Not on file     Relationship status: Not on file   Other Topics Concern    Not on file   Social History Narrative    Not on file       Review of patient's allergies indicates:   Allergen Reactions    Prolia [denosumab] Other (See Comments)     Numbness and low calcium    Banana Nausea Only    Flonase [fluticasone]      Local blisters    Tramadol      Dizziness w/ N/V       Review of Systems:  General ROS: negative for - fever  Psychological ROS: negative for - hostility  Hematological and Lymphatic ROS: negative for - bleeding problems, on plavix  Endocrine ROS: negative for - unexpected weight changes  Respiratory ROS: no cough, shortness of breath, or wheezing  Cardiovascular ROS: no chest pain or dyspnea on exertion  Gastrointestinal ROS: no abdominal pain, change in bowel habits, or black or bloody stools  Musculoskeletal ROS: positive for - muscular weakness  Neurological ROS: positive for - numbness/tingling  negative for - bowel and bladder control changes  Dermatological ROS: negative for rash    Physical Exam:  Vitals:    08/16/19 0949   BP: (!) 113/57   Pulse: 64   Resp: 18   Temp: 96.4 °F (35.8 °C)   TempSrc: Oral   SpO2: 99%   Weight: 56.6 kg (124 lb 12.5 oz)   Height: 5' 4" (1.626 m)   PainSc:   6   PainLoc: Hip     Body mass index is 21.42 kg/m².     Gen: " NAD  Gait: gait intact  Psych:  Mood appropriate for given condition  HEENT: eyes anicteric   GI: Abd soft  CV: RRR  Lungs: breathing unlabored   ROM: limited AROM of the L spine in all planes, full ROM at ankles, knees and hips  Lumbar flexion 90 degrees, extension 50 degrees, side bending 30 degrees.    Sensation: intact to light touch in all dermatomes tested from L2-S1 bilaterally  Reflexes: 1+ b/l patella and 0/0 b/l Achilles, biceps and triceps, plantar response down going, no ankle clonus  Palpation: Diffusely tender over lumbar paraspinals  -TTP over the b/l greater trochanters and bilateral SI joint  Tone: normal in the b/l knees and hips   Skin: intact  Extremities: No edema in b/l ankles or hands  Provacative tests: - SLR, + b/l axial facet loading       Right Left   L2/3 Iliacus Hip flexion  5  5   L3/4 Qudratus Femoris Knee Extension  5  5   L4/5 Tib Anterior Ankle Dorsiflexion   5  5   L5/S1 Extensor Hallicus Longus Great toe extension  5-  5   L4/5 Tib Anterior/Posterior Inversion  5  5   L5/S1 Extensor Digitorum Longus, Peronues Eversion  5  5   S1/S2 Gastroc/Soleus Plantar Flexion  5  5       Imaging:  MRI lumbar spine 7/16/19  FINDINGS:  There is mild lumbar scoliosis convex to the right.  No spondylolisthesis is seen.  The vertebral bodies are intact without evidence of fracture or compression.  There are however significant bone marrow changes identified in L4 and L5 adjacent to the disc space consistent with degenerative disc disease.  There is also significant disc space narrowing identified at L4-5.  There is decreased MRI signal from all the lumbar disc consistent with desiccation.    At L2-3 there is hypertrophy of the facets and ligamentum flavum and short pedicles with mild spinal canal stenosis.  At L3-4 there is more prominent asymmetric hypertrophy of the facets and ligamentum flavum and short pedicles which produces moderate spinal canal stenosis and right greater than left neural  foraminal stenosis.  At L4-5 there is circumferential disc protrusion which protrudes more significantly to the right as well as short pedicles and hypertrophy of the facets which produces severe spinal canal and right greater than left neural foraminal stenosis.  At L5-S1 stenosis is not seen.    Labs:  BMP  Lab Results   Component Value Date     07/15/2019    K 4.3 07/15/2019    CL 96 07/15/2019    CO2 29 07/15/2019    BUN 24 (H) 07/15/2019    CREATININE 4.7 (H) 07/15/2019    CALCIUM 9.7 07/15/2019    ANIONGAP 13 07/15/2019    ESTGFRAFRICA 9.8 (A) 07/15/2019    EGFRNONAA 8.5 (A) 07/15/2019     Lab Results   Component Value Date    ALT 21 07/15/2019    AST 19 07/15/2019    GGT 25 09/02/2014    ALKPHOS 177 (H) 07/15/2019    BILITOT 0.8 07/15/2019       Assessment:  Problem List Items Addressed This Visit        Neuro    Lumbar radiculopathy - Primary       Orthopedic    Chronic bilateral low back pain with right-sided sciatica          Treatment Plan:  73 y.o. year old female with PMH ESRD s/p kidney transplant currently on dialyiss, primary biliary cholangitis s/p OLT x2, HTN, presents to the office with back pain.  She has had the pain for about the past 6 months.  Today her pain is 6/10, intermittent, aching, numb, radiating down her right leg to her right big toe.  + numbness, + weakness, no bowel/bladder dysfunction.  Her pain is worse with prolonged standing and relived with rest and sitting.  She currently takes gabapentin qhs every other day with mild relief.  She avoids NSAIDs 2/2 to renal/liver history.  She has tried and failed tramadol in the past.  She is currently doing PT without a significant improvement.  On exam she has 5-/5 right EHL, 1+ b/l patellar, 0/0 b/l achilles, sensation intact to light touch b/l L2-S1, + b/l axial facet loading.  Reviewed her MRI lumbar spine today in the office, she has b/l multilevel facet arthropathy, L4-5 circumferential disc protrusion which protrudes more  significantly to the right which produces severe spinal canal and right greater than left neural foraminal stenosis.  I think her pain is predominantly related to the disc and NFS at L4-5, with some overlapping axial back pain from her facets.  Her pain is limiting her mobility and interfering with her ADL's.  Will schedule for right L4/5 TFESI.  Discussed will need clearance to hold plavix for 7 days and also discussed her increased risk of infection given immunosuppression to prevent transplant rejection.  She will follow up 2 weeks post procedure.    Procedures: right L4/5 TFESI.  Procedure explained using an anatomical model.  Risks, benefits, alternatives explained to patient who verbalized understanding, including increased risk of infection, bleeding, need for additional procedures or surgery, and nerve damage.  Questions regarding the procedure, risks, expected outcome, and possible side effects were solicited and answered to the patient's satisfaction.  Dacia wishes to proceed with the injection.  Verbal and written consent were obtained in clinic today.  PT/OT/HEP: continue PT and HEP  Medications: increase gabapentin to 100mg qhs.  She has failed tramadol in the past and is not interested in trying it again.  Will defer tylenol with codeine given liver history.   Labs: Reviewed and medications are appropriately dosed for current hepatorenal function.  Imaging: No additional recommended at this time.    : Not applicable    Joao Campbell M.D.  Interventional Pain Medicine / Anesthesiology

## 2019-08-20 PROBLEM — M25.551 PAIN OF RIGHT HIP JOINT: Status: ACTIVE | Noted: 2019-01-01

## 2019-08-20 NOTE — PROGRESS NOTES
TIME RECORD    Date:  08/20/2019    Start Time:  0900  Stop Time:  0955    PROCEDURES:    TIMED  Procedure Time Min.   TE Start:0900  Stop:0944 44    Start:  Stop:     Start:  Stop:     Start:  Stop:          UNTIMED  Procedure Time Min.   HP Start:0945  Stop:0955 10    Start:  Stop:      Total Timed Minutes:  44  Total Timed Units:  3  Total Untimed Units:  1  Charges Billed/# of units:  4      Progress/Current Status    Subjective:     Patient ID: Dacia Hamilton is a 73 y.o. female.  Diagnosis:   1. Pain of right hip joint     2. Low back pain, unspecified back pain laterality, unspecified chronicity, with sciatica presence unspecified       Pain: 7 /10      Objective:     TE for ROM,strength,gait. F/BHP X 10'.    Assessment:   Performed well.      Patient Education/Response:     Posture ed.    Plans and Goals:     Cont per POC.

## 2019-08-20 NOTE — PROGRESS NOTES
08/20/19 0900   Ankle Exercises   Double Leg Calf Raises Reps/Sets/Weight 30   Standing Dorsiflexion Stretch(Gastroc) Reps/Sets/Hold Time 3X30   Hip Exercises   Isometric Hip Adduction Reps/Sets/Hold Time HIP 4 WAYS 30   Knee Exercises   Frontal Squats Reps/Sets/Weight 30   Lumbar Exercises   Posterior Pelvic Tilts  Reps/Sets/Hold Time 30   Single Knee To Chest Stretch  Reps/Sets/Hold Time 3X30   Double Knee To Chest Stretch Reps/Sets/Hold Time 30   Hamsting Stretch Reps/Sets/Hold Time 3X30   Piriformis Stretch Reps/Sets/Hold Time 3X30   Bridging Reps/Sets/Hold Time 3X10   Lower Trunk Rotation Stretch Reps/Sets/Hold Time 30/30   Additional Exercises   Additional Exercise NUSTEP 12 L3

## 2019-08-21 PROBLEM — K76.89 LIVER DYSFUNCTION: Status: RESOLVED | Noted: 2018-06-26 | Resolved: 2019-01-01

## 2019-08-21 PROBLEM — I10 HTN (HYPERTENSION): Status: RESOLVED | Noted: 2018-02-25 | Resolved: 2019-01-01

## 2019-08-21 NOTE — Clinical Note
Biopsy in 2018 showed advanced fibrosis (stage 3 out of 4). Need to start HCC screening with ultrasound and AFP every 6 months. Next US scheduled already scheduled for 9/10.

## 2019-08-21 NOTE — PROGRESS NOTES
Transplant Hepatology  Liver Transplant Recipient Follow-up    Transplant Date: 2006  UNOS Native Liver Dx: Other, Specify - renal insuffiency    Dacia is here for follow up of her liver transplant.    ORGAN: LIVER  Whole or Partial: whole liver  Donor Type:  - brain death  [facility] High Risk:   Donor CMV Status: Positive  Donor HCV Status: Negative  Donor HBcAb: Negative  Donor HBV ANIBAL:   Donor HCV ANIBAL:   Biliary Anastomosis:   Arterial Anatomy:   IVC reconstruction:   Portal vein status:     She has had the following complications since transplant: none.    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 No further issues with this.     Subjective:     Interval History: Dacia was last seen on 18 by Dr. Wise. She is now 13 years s/p liver-kidney transplant for recurrent PBC. She had 2 prior liver transplants in [place] in . She was denied for another kidney transplant and has remained on dialysis since 2016 (HD MWF).       Currently, she is doing well. Current complaints include none.    Interval hospitalizations: none    Other:  Having pain and numbness in back/R leg. Scheduled to get epidural injection for bulging disc in back.     Current immunosuppression:   - Tacrolimus: 2 mg BID                                      - Last level: 7.1                                        Graft Function: excellent; LFTs normal. ALP mildly elevated, 160-170.      Liver Biopsy: 18  FINAL PATHOLOGIC DIAGNOSIS  Liver, allograft (post transplant 2006), biopsy:  - Minimal acute cellular rejection with endothelialitis and portal inflammation.  - Advanced fibrosis, stage 3 (of 4).  - Severe siderosis, 4+, within hepatocytes and Kupffer cells.  - No granulomas seen.  - No significant steatosis seen.  - No ductopenia seen.    Last imaging:  Abd US 18 -- no focal hepatic lesions    Post-LT Recommended  Health Maintenance  Dermatology: up to date, h/o skin CA. Seeing Derm every 6 months                                      Colonoscopy: 2014 - no polyps or abnormal findings                                        Bone Mineral Density (BMD): 8/2017 - osteoporosis                HbA1C:   Lab Results   Component Value Date    HGBA1C 5.2 04/13/2015     BP today: 127/62  BMI: Body mass index is 21.68 kg/m².  Mammogram / pap: up to date      Review of Systems  Constitutional: Negative for appetite change, chills, fatigue, fever and unexpected weight change.   Eyes: Negative for visual disturbance.   Respiratory: Negative for cough and shortness of breath.    Cardiovascular: Negative for chest pain, palpitations and leg swelling.   Gastrointestinal: Negative for abdominal distention, abdominal pain, blood in stool, constipation, diarrhea, nausea and vomiting. No change in stool color.  Genitourinary: Negative for dysuria and hematuria. Denies dark urine.   Musculoskeletal: (+) arthralgias, myalgias, gait problem.    Skin: Negative for color change, rash and wound. Denies itching.   Neurological: Negative for dizziness, tremors, speech difficulty, and headaches. (+) numbness in R leg.   Hematological: (+) bruise/bleed easily.   Psychiatric/Behavioral: Negative for confusion, decreased concentration and sleep disturbance. Denies memory loss. Denies depression. The patient is not nervous/anxious.          Objective:     Physical Exam   Constitutional: Well-nourished. No distress. Alert and oriented to person, place, and time.  Eyes: No scleral icterus.   Cardiovascular: Normal rate, regular rhythm and normal heart sounds. No murmur heard.  Pulmonary/Chest: Respiratory effort and breath sounds normal. No respiratory distress.   Abdominal: Soft, non-tender. No distension; no ascites appreciated. There is no palpable hepatosplenomegaly. No hernia or mass. Well healed chevron scar.  Musculoskeletal: No edema.   Neurological: No  tremor. Coordination normal. Ambulating w/ walker though able to get on exam table without assistance. No asterixis.    Skin: Skin is warm and dry. No rash or erythema. No jaundice. No telangiectasias or palmar erythema noted. LUE fistula, dressing CDI. (+) thrill  Psychiatric: Normal mood and affect. Speech, behavior, and thought content normal. No depression or anxiety noted.       Lab Results   Component Value Date    BILITOT 0.8 07/15/2019    AST 19 07/15/2019    ALT 21 07/15/2019    ALKPHOS 177 (H) 07/15/2019    CREATININE 4.7 (H) 07/15/2019    ALBUMIN 4.2 07/15/2019     Lab Results   Component Value Date    WBC 7.24 07/15/2019    HGB 11.3 (L) 07/15/2019    HCT 35.6 (L) 07/15/2019     07/15/2019     Lab Results   Component Value Date    TACROLIMUS 7.1 07/15/2019       Abd US - 7/19/18  FINDINGS:  Patient is status post orthotopic liver transplant.  Liver allograft is normal in size.  The liver demonstrates homogeneous echotexture.  No focal hepatic lesions are seen.  No fluid collections.    The common duct is not dilated, measuring 5.3 mm.  No dilated intrahepatic radicles are seen.    The pancreas is unremarkable.  The right and left kidneys are severely atrophic.  Multiple cysts are present the largest measuring 1.3 cm on the left.  The spleen is normal in size measuring 10.3 cm in length.  There is no ascites.    Color flow and spectral waveform analysis was performed.  The main portal vein, right portal vein, left portal vein, middle hepatic vein, right hepatic vein, left hepatic vein, SMV, and IVC are patent with proper directional flow.    Anastomosis site of the main hepatic artery demonstrates a peak systolic velocity 103 cm/sec, compared with 109 centimeters/second previously.    Main hepatic artery demonstrates resistive index 0.8 with normal waveform, compared with 0.86 previously.    Left hepatic artery shows resistive index 0.82 with normal waveform, compared with 0.84  previously.    Anterior branch of the right hepatic artery demonstrates resistive index 0.75 with normal waveform, previously 0.82.    Posterior branch of the right hepatic artery demonstrates resistive index 0.84 with normal waveform, previously 0.85.      Impression       Status post liver transplant, with Doppler examination demonstrating no detrimental change.  Mild decrease in hepatic arterial resistive indices observed.    Severe bilateral renal atrophy with small bilateral renal cysts.         Assessment/Plan:   1. S/P liver transplant x 3, kidney transplant x 1 due to: recurrent PBC   -- Stable LFTs. Continue monitoring symptoms, labs, and drug levels for drug-related toxicity and side effects.  -- Continue with post transplant labs per protocol    2. Prophylactic immunotherapy  -- Current IS: tacrolimus 2 mg BID                                 -- Most recent trough level: 7.1  -- Chronic immunosuppressive medications for rejection prophylaxis already reviewed by staff, noted to be stable. No changes recommended at this time.     3. Health maintenance  -- DXA due: now  -- Colonoscopy: up to date, no abnormal findings. Likely does not require repeat screening colonoscopy  -- Skin CA screening: up to date, continue f/u with Derm    4. Advanced hepatic fibrosis, stage 3 (biopsy proven)   -- Start HCC screening with US and AFP every 6 months, due now    5. PBC  -- Start ursodiol 250 mg TID    6. ESRD on dialysis     7. Hypertension  -- Well controlled, continue f/u with PCP and nephrology         Orders Placed This Encounter   Procedures    US Liver Transplant Post          Return to clinic in 1 year        Xochitl Joe NP    Hepatology and Liver Transplant       UN Patient Status  Functional Status: 80% - Normal activity with effort: some symptoms of disease  Physical Capacity: Limited Mobility

## 2019-08-22 NOTE — PROGRESS NOTES
"Name: Dacia Hamilton  [facility] Number: [id number]  Date of Treatment: 08/22/2019   Diagnosis:   Encounter Diagnosis   Name Primary?    Pain of right hip joint Yes       Time in: 0948   Time Out: 1050  Total Treatment Time: 62      Subjective:    Dacia reports "My strength is getting better but I still hurt more than I want to."  Patient reports their pain to be 6/10 on a 0-10 scale with 0 being no pain and 10 being the worst pain imaginable.    Objective  Dacia received therapeutic exercises to develop strength, endurance, ROM, flexibility, posture and core stabilization for 57 minutes including:   NuStep 12' L2 to increase endurance, mobility, and strength in LE's   Hamstring stretch 3/30s R/L   Piriformis stretch 3/30s R/L   Calf stretch on 1/2 roll 3/30s   Standing hip abd on foam oval 2/10   HR/TR on airex 3/10   Mini squat on airex  3/10   PPT with ball squeeze 3/10   Bridges with GTB 3/10   SLR 2# 3/10   LTR 3/10 with tball   DKTC 3/10 with tball   TrA in supine 3/10 with tball   Supine ITB stretch with strap 3/30s R/L    MHP x 5' to low back post therex to decrease post therex discomfort    Written Home Exercises Provided: Cont with HEP  Pt demo good understanding of the education provided. Dacia demonstrated good return demonstration of activities.     Assessment:   Patient with good tolerance of therex, decreased pain post therex/MHP.  Patients endurance is improving with cont'd therapy, however pain still remains patients complaint    Pt will continue to benefit from skilled PT intervention. Medical Necessity is demonstrated by:  Fall Risk, Continued inability to participate in vocational pursuits, Pain limits function of effected part for some activities, Requires skilled supervision to complete and progress HEP and Weakness.    Patient is making good progress towards established goals.    Plan:  Continue with established Plan of Care towards PT goals.   "

## 2019-08-27 NOTE — PROGRESS NOTES
Name: Dacia Hamilton  Clinic Number: [id number]  Date of Treatment: 08/27/2019   Diagnosis: No diagnosis found.    Time in: 0903  Time Out: 1010  Total Treatment Time: 67        Subjective:    Dacia reports she is having pain in her low back mostly on right side.  Patient reports their pain to be  6-7/10 on a 0-10 scale with 0 being no pain and 10 being the worst pain imaginable.    Objective        Dacia received therapeutic exercises to develop strength, endurance, ROM, flexibility, posture and core stabilization for 47 minutes including:     nustep x 12 min L-3  Hamstring stretch 3/30 sec  Piriformis stretch 3 x 30 sec  Calf stretch on 1/2 roll 3 x 30 sec  Standing hip abd airex 3 x 10  HR/TR on airex 3 x 10  Mini squat on airex  3 x 10  PPT  3 x 10  Bridges 3 x 10  Ball squeezes x 30 reps  Hip abd GTB x 30 reps  SLR 2# L, 0# R   3 x 10 (unable to perf on R LE due to pain)  LTR 3 x 10 with tball  DKTC 3 x 10 with tball  TrA in supine 3 x 10 with tball        Dacia received the following manual therapy techniques: Soft tissue Mobilization were applied to the: low/mid back  for 10 minutes.     MHP x 10 min to low back in supine to decrease pain and muscle soreness.      Pt demo good understanding of the education provided. Dacia demonstrated good return demonstration of activities.     Assessment:   Pain with SLR on R with weight.  Weight removed and pt still unable to lift R LE due to pain.  Decreased stiffness following manual and MHP reported    Pt will continue to benefit from skilled PT intervention. Medical Necessity is demonstrated by:  Pain limits function of effected part for some activities, Requires skilled supervision to complete and progress HEP and Weakness.    Patient is making good progress towards established goals.          Plan:  Continue with established Plan of Care towards PT goals.    No

## 2019-08-27 NOTE — TELEPHONE ENCOUNTER
Patient will be going to dialysis tomorrow am prior to her procedure tomorrow am, current orders indicated an IV access she may be a very limited candidate for this. Please advise

## 2019-08-28 NOTE — DISCHARGE SUMMARY
[facility]  Discharge Note  Short Stay    Admit Date: 8/28/2019    Discharge Date: 8/28/2019    Attending Physician: Joao Campbell     Discharge Provider: Joao Campbell    Diagnoses:  Active Hospital Problems    Diagnosis  POA    Lumbar radiculopathy [M54.16]  Yes      Resolved Hospital Problems   No resolved problems to display.       Discharged Condition: Good    Final Diagnoses: Lumbar radiculopathy [M54.16]    Disposition: Home or Self Care    Hospital Course: No complications, uneventful    Outcome of Hospitalization, Treatment, Procedure, or Surgery:  Patient was admitted for outpatient interventional pain management procedure. The patient tolerated the procedure well with no complications.    Follow up/Patient Instructions:  Follow up as scheduled in Pain Management office in 3-4 weeks.  Patient has received instructions and follow up date and time.    Medications:  Continue previous medications    Discharge Procedure Orders   Notify your health care provider if you experience any of the following:  temperature >100.4     Notify your health care provider if you experience any of the following:  persistent nausea and vomiting or diarrhea     Notify your health care provider if you experience any of the following:  severe uncontrolled pain     Notify your health care provider if you experience any of the following:  redness, tenderness, or signs of infection (pain, swelling, redness, odor or green/yellow discharge around incision site)     Notify your health care provider if you experience any of the following:  difficulty breathing or increased cough     Notify your health care provider if you experience any of the following:  severe persistent headache     Notify your health care provider if you experience any of the following:  worsening rash     Notify your health care provider if you experience any of the following:  persistent dizziness, light-headedness, or visual disturbances     Notify your  health care provider if you experience any of the following:  increased confusion or weakness     Activity as tolerated         Discharge Procedure Orders (must include Diet, Follow-up, Activity):   Discharge Procedure Orders (must include Diet, Follow-up, Activity)   Notify your health care provider if you experience any of the following:  temperature >100.4     Notify your health care provider if you experience any of the following:  persistent nausea and vomiting or diarrhea     Notify your health care provider if you experience any of the following:  severe uncontrolled pain     Notify your health care provider if you experience any of the following:  redness, tenderness, or signs of infection (pain, swelling, redness, odor or green/yellow discharge around incision site)     Notify your health care provider if you experience any of the following:  difficulty breathing or increased cough     Notify your health care provider if you experience any of the following:  severe persistent headache     Notify your health care provider if you experience any of the following:  worsening rash     Notify your health care provider if you experience any of the following:  persistent dizziness, light-headedness, or visual disturbances     Notify your health care provider if you experience any of the following:  increased confusion or weakness     Activity as tolerated

## 2019-08-28 NOTE — OP NOTE
Procedure Note    Procedure Date: 8/28/2019    Procedure Performed: right Transforaminal Epidural @ L4/5, with Fluoroscopic Guidance    Indications: Patient has failed conservative therapy.      Pre-op diagnosis: Lumbar Radiculopathy    Post-op diagnosis: same    Physician: Joao Campbell MD    Sedation medications: versed 2mg    Medications injected: 3cc of mixture of 0.25% Bupivacaine 2ml, kenalog 40mg, 3 mL sterile, preservative-free normal saline.    Local anesthetic used: 1% Lidocaine 1 ml, 8.4% sodium bicarbonate 0.25ml    Estimated Blood Loss: none    Complications:  none    Technique:  The patient was interviewed in the holding area and Risks/Benefits were discussed, including, but not limited to, the possibility of new or different pain, bleeding or infection.   All questions were answered.  The patient understood and accepted risks.  Consent was reviewed.  A time out was taken to identify the patient, procedure and side of the procedure. The patient was placed in a prone position, then prepped and draped in the usual sterile fashion using ChloraPrep and sterile towels.  The Right L4 neural foramena were identified under fluoroscopic guidance in AP and oblique view.  Local anesthetic was given by raising a wheal and going down to the hub of a 25-gauge 1.5 inch needle.  In oblique view, a 3.5 inch 22-gauge bent-tip spinal needle was introduced into the foramen @ L4 and positioned in the posterior superior quarter of the foramen.  .5cc of Omnipaque contrast was injected live in an AP fluoroscopic view at each level demonstrating appropriate needle position with contrast spread outlining the respective nerve root and also medially into the epidural space without intravascular or intrathecal spread.  Then, after negative aspiration, 3 cc of a mixture of 2 mL Bupivacaine 0.25%, 40 mg Kenalog, and 3 mL sterile, preservative-free normal saline. (total 6 mL) was injected slowly and incrementally.  The needle(s)  was(were) flushed with normal saline and removed.  The patient tolerated the procedure well.  The patient was monitored after the procedure.  Patient was given post procedure and discharge instructions to follow at home. The patient was discharged in a stable condition.

## 2019-08-28 NOTE — INTERVAL H&P NOTE
The patient has been examined and the H&P has been reviewed:    I concur with the findings and no changes have occurred since H&P was written.  ASA 3, MP II    Anesthesia/Surgery risks, benefits and alternative options discussed and understood by patient/family.      Active Hospital Problems    Diagnosis  POA    Lumbar radiculopathy [M54.16]  Yes      Resolved Hospital Problems   No resolved problems to display.

## 2019-09-04 NOTE — PROGRESS NOTES
HPI     Spots and/or Floaters      Additional comments: 6 month floater f/u OU//  still there off and on   not often //  no flashes//              Comments     6 month floater f/u OU//  still there off and on not often //  No flashes   //          Last edited by Laney Caro on 9/4/2019  1:34 PM. (History)        ROS     Negative for: Constitutional, Gastrointestinal, Neurological, Skin,   Genitourinary, Musculoskeletal, HENT, Endocrine, Cardiovascular, Eyes,   Respiratory, Psychiatric, Allergic/Imm, Heme/Lymph    Last edited by Eric Abad Jr., MD on 9/4/2019  5:55 PM. (History)        Assessment /Plan     For exam results, see Encounter Report.    Posterior vitreous detachment of both eyes  Patient was counseled on the following:  IF YOU HAVE:  1. Increase in floaters or flashing lights  2. Worsening vision  3. Appearance of a persistent curtain  4. Shadow anywhere in the field  of vision  Return immediately if these symptoms develop.  There is an increased possibility that you will have the develop floaters and flashes in the opposite eye.  Pseudophakia of both eyes  Stable, observe  Refractive error  Continue with current glass rx  Follow up in about 1 year (around 9/4/2020) for Annual.

## 2019-09-09 NOTE — PROGRESS NOTES
09/09/19 1500   Ankle Exercises   Double Leg Calf Raises Reps/Sets/Weight 30   Standing Dorsiflexion Stretch(Gastroc) Reps/Sets/Hold Time 3X30   Hip Exercises   Isometric Hip Adduction Reps/Sets/Hold Time HIP 4 WAYS 30    Knee Exercises   Frontal Squats Reps/Sets/Weight 30   Lumbar Exercises   Posterior Pelvic Tilts  Reps/Sets/Hold Time 30   Double Knee To Chest Stretch Reps/Sets/Hold Time 30   Hamsting Stretch Reps/Sets/Hold Time 3X30   Piriformis Stretch Reps/Sets/Hold Time 3X30   Bridging Reps/Sets/Hold Time 30   Crunches  Reps/Sets 30   Lower Trunk Rotation Stretch Reps/Sets/Hold Time 30/30   Additional Exercises   Additional Exercise NUSTEP 12'L3   Additional Exercise HEP 10

## 2019-09-09 NOTE — PROGRESS NOTES
TIME RECORD    Date: 9/9/19    Start Time:  1100  Stop Time:  1153    PROCEDURES:    TIMED  Procedure Time Min.   TE Start:1100  Stop:1153 53    Start:  Stop:     Start:  Stop:     Start:  Stop:          UNTIMED  Procedure Time Min.    Start:  Stop:     Start:  Stop:      Total Timed Minutes:  53  Total Timed Units:  4  Total Untimed Units:    Charges Billed/# of units:  [facility] OUTPATIENT DISCHARGE SUMMARY  Physical Therapy      Name:  Dacia Hamilton  Date:  9/9/19  Date of Evaluation:  7/22/19.  Physician:  ADOLFO  Total # Of Visits:  12  Cancelled:    No Shows:    Diagnosis:    1. Low back pain, unspecified back pain laterality, unspecified chronicity, with sciatica presence unspecified         Physical/Functional Status:  At time of discharge, patient was able to Pain 3-4/10ROM of LS and rt hip is WFL/WNL.  Strength 5-/5.  The patient is to be discharged from our Therapy service for the following reason(s):  Patient has met all of his/her goals    Degree of Goal Achievement:  Patient has partially met goals, OSWESTRY 21/50    Patient Education:  HEP issued.    Discharge Plan:  Home Program:  3-5 x wk/PRN.

## 2019-09-12 NOTE — TELEPHONE ENCOUNTER
----- Message from Xochitl Joe NP sent at 9/12/2019  2:07 PM CDT -----  Ultrasound is stable, no masses in the liver. Next US for liver cancer screening due in 6 months, March 2020.

## 2019-09-12 NOTE — TELEPHONE ENCOUNTER
Notified patient via portal that this is a follow up to your recent ultrasound test.  Xochitl Joe NP reviewed your US and it is stable.  There are no medication changes at this time.   Thank you  Lisa Yap MSN, RNBC    Liver Transplant Coordinator

## 2019-09-17 NOTE — PROGRESS NOTES
[facility] Follow Up Evaluation    Referred by: Dr. Woods  Reason for referral: back pain    CC:   Chief Complaint   Patient presents with    Follow-up     injection didn't help    Joint Pain    Low-back Pain      Last 3 PDI Scores 9/17/2019 8/16/2019   Pain Disability Index (PDI) 16 12     Interval HPI 9/17/19: Ms. Hamilton returns to the office for follow up.  She is s/p right L4/5 TFESI 8/28/19 with about 25% relief of her pain.  Today she continues to have pain radiating down the right leg to the right big toes, can get to 8/10, occasional numbness and weakness.      HPI:   Dacia Hamilton is a 73 y.o. female who complains of back pain    Onset: 6 months  Inciting Event: none  Progression: since onset, pain is gradually worsening  Current Pain Score: 6/10  Timing: intermittent  Quality: Aching and Numb  Radiation: yes, down the right leg to the great toe  Associated numbness or weakness: yes numbness, yes weakness   Exacerbated by: prolonged standing  Allievated by: rest, sitting  Is Pain Level Acceptable?: No    Previous Therapies:  PT/OT: yes, currently  HEP:   Interventions:   Surgery:  Medications:   - NSAIDS:   - MSK Relaxants:   - TCAs:   - SNRIs:   - Topicals:   - Anticonvulsants: gabapentin 100mg qdaily  - Opioids:     History:    Current Outpatient Medications:     ALPRAZolam (XANAX) 0.25 MG tablet, TAKE 1 TABLET BY MOUTH AS NEEDED, Disp: 30 tablet, Rfl: 3    calcium carbonate (CALCIUM ANTACID) 300 mg (750 mg) Chew, Take 3 tablets by mouth 3 (three) times daily with meals. 2 with snacks, Disp: , Rfl:     clopidogrel (PLAVIX) 75 mg tablet, Take 75 mg by mouth once daily., Disp: , Rfl:     FOLBEE PLUS 5 mg Tab, TAKE 1 TABLET BY MOUTH AT BEDTIME, Disp: 30 each, Rfl: 6    folic acid (FOLVITE) 400 MCG tablet, Take 800 mcg by mouth once daily. , Disp: , Rfl:     gabapentin (NEURONTIN) 100 MG capsule, TAKE 1 CAPSULE BY MOUTH ONCE DAILY EVERY EVENING, Disp: 30 capsule, Rfl: 3    levothyroxine  (SYNTHROID) 75 MCG tablet, Take 1 tablet (75 mcg total) by mouth once daily., Disp: 90 tablet, Rfl: 3    lidocaine-prilocaine (EMLA) cream, APPLY TO AFFECTED AREA (FISTULA) 1 HOUR PRIOR TO HD, Disp: , Rfl: 3    losartan (COZAAR) 25 MG tablet, Take 25 mg by mouth once daily., Disp: , Rfl:     NIFEdipine (PROCARDIA-XL) 30 MG (OSM) 24 hr tablet, Take 1 tablet (30 mg total) by mouth once daily., Disp: 90 tablet, Rfl: 3    phenyleph-min oil-petrolatum (PREPARATION H) 0.25-14-74.9 % Oint, Place 1 applicator rectally 4 (four) times daily as needed (hemorrhoids). , Disp: , Rfl:     PROPYLENE GLYCOL/ (SYSTANE, PROPYLENE GLYCOL, OPHT), Place 1 drop into both eyes once daily. , Disp: , Rfl:     tacrolimus (PROGRAF) 1 MG Cap, TAKE 2 CAPSULES (2 MG TOTAL) BY MOUTH EVERY 12 (TWELVE) HOURS., Disp: 120 capsule, Rfl: 11    ursodiol (ACTIGALL) 250 mg Tab, Take 1 tablet (250 mg total) by mouth 3 (three) times daily with meals., Disp: 270 tablet, Rfl: 3    zolpidem (AMBIEN) 5 MG Tab, Take 1 tablet (5 mg total) by mouth nightly as needed (insomnia). 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    3 Liver Transplants (first 2 in same mo)    PVD (posterior vitreous detachment), bilateral     Sciatica     Urinary tract infection        Past Surgical History:   Procedure Laterality Date    2 previous liver transplant  8/1991- 2006    at [place]    APPENDECTOMY      AV  FISTULA PLACEMENT Left 2017    BIOPSY, LIVER N/A 2018    Performed by [facility] at [facility] OR 2ND FLR    [id number] BONE MARROW BX N/A 2016    Performed by Jace Headley [facility] at [facility] CATH    CATARACT EXTRACTION BILATERAL W/ ANTERIOR VITRECTOMY      CATARACT EXTRACTION W/  INTRAOCULAR LENS IMPLANT Bilateral      SECTION, CLASSIC  1968    CHOLECYSTECTOMY      COLONOSCOPY  2014    Dr. Sánchez; normal findings per patient report    combined liver-kidney transplant  2006    Primary Biliary Cirrhosis    [id number]-[id number]-Arm Left 2017    Performed by Justin Fermin MD at [facility] OR    CYSTOSCOPY      ESOPHAGOGASTRODUODENOSCOPY (EGD) N/A 2017    Performed by Wis[facility] Gibbons Jr., [facility] at [facility] ENDO    ESOPHAGOGASTRODUODENOSCOPY (EGD) N/A 2017    Performed by Wisam Gibbons Jr., MD at [facility] ENDO    EYE SURGERY      HEART CATH-LEFT N/A 3/3/2017    Performed by Victor Hugo Hitchcock MD at [facility] CATH    hepaticojejunostomy (pretty en Y)  10/2006    Injection,steroid,epidural,transforaminal approach L4/5 Right 2019    Performed by Joao Campbell MD at [facility] OR    KIDNEY TRANSPLANT      25% of Both Kidneys/Third Kidney    LIVER TRANSPLANT      #3 liver transplants     TONSILLECTOMY      TRANSPLANT-KIDNEY N/A 2016    Performed by [facility] at [facility] OR 2ND FLR    TUBAL LIGATION  1968    UPPER GASTROINTESTINAL ENDOSCOPY  2017    Dr. Gibbons, repeat in 2-3 months    yag cap OD Right        Family History   Problem Relation Age of Onset    Hypertension Mother     Cataracts Mother     Diabetes Mother     Hypertension Father     Cataracts Father     No Known Problems Sister     Hypertension Brother     No Known Problems Brother     Kidney disease Neg Hx     Colon cancer Neg Hx     Colon polyps Neg Hx     Crohn's disease Neg Hx     Ulcerative colitis Neg Hx     Amblyopia Neg Hx     Blindness Neg Hx     Glaucoma  Neg Hx     Macular degeneration Neg Hx     Retinal detachment Neg Hx     Strabismus Neg Hx     Cancer Neg Hx     Stroke Neg Hx     Thyroid disease Neg Hx        Social History     Socioeconomic History    Marital status:      Spouse name: Not on file    Number of children: Not on file    Years of education: Not on file    Highest education level: Not on file   Occupational History    Not on file   Social Needs    Financial resource strain: Not on file    Food insecurity:     Worry: Not on file     Inability: Not on file    Transportation needs:     Medical: Not on file     Non-medical: Not on file   Tobacco Use    Smoking status: Never Smoker    Smokeless tobacco: Never Used   Substance and Sexual Activity    Alcohol use: No    Drug use: No    Sexual activity: Not on file   Lifestyle    Physical activity:     Days per week: Not on file     Minutes per session: Not on file    Stress: Not on file   Relationships    Social connections:     Talks on phone: Not on file     Gets together: Not on file     Attends [employer] service: Not on file     Active member of club or organization: Not on file     Attends meetings of clubs or organizations: Not on file     Relationship status: Not on file   Other Topics Concern    Not on file   Social History Narrative    Not on file       Review of patient's allergies indicates:   Allergen Reactions    Prolia [denosumab] Other (See Comments)     Numbness and low calcium    Banana Nausea Only    Flonase [fluticasone]      Local blisters    Tramadol      Dizziness w/ N/V       Review of Systems:  General ROS: negative for - fever  Psychological ROS: negative for - hostility  Hematological and Lymphatic ROS: negative for - bleeding problems, on plavix  Endocrine ROS: negative for - unexpected weight changes  Respiratory ROS: no cough, shortness of breath, or wheezing  Cardiovascular ROS: no chest pain or dyspnea on exertion  Gastrointestinal ROS: no  abdominal pain, change in bowel habits, or black or bloody stools  Musculoskeletal ROS: positive for - muscular weakness  Neurological ROS: positive for - numbness/tingling  negative for - bowel and bladder control changes  Dermatological ROS: negative for rash    Physical Exam:  Vitals:    09/17/19 1353   BP: (!) 108/57   Pulse: 69   Resp: 18   Temp: 97 °F (36.1 °C)   TempSrc: Oral   SpO2: 98%   Weight: 59.1 kg (130 lb 2.9 oz)   PainSc:   6   PainLoc: Back     Body mass index is 22.35 kg/m².     Gen: NAD  Gait: gait intact  Psych:  Mood appropriate for given condition  HEENT: eyes anicteric   GI: Abd soft  CV: RRR  Lungs: breathing unlabored   ROM: limited AROM of the L spine in all planes, full ROM at ankles, knees and hips  Lumbar flexion 90 degrees, extension 50 degrees, side bending 30 degrees.    Sensation: intact to light touch in all dermatomes tested from L2-S1 bilaterally  Reflexes: 1+ b/l patella and 0/0 b/l Achilles  Palpation: Diffusely tender over lumbar paraspinals  -TTP over the b/l greater trochanters and bilateral SI joint  Tone: normal in the b/l knees and hips   Skin: intact  Extremities: No edema in b/l ankles or hands  Provacative tests: - SLR, + b/l axial facet loading       Right Left   L2/3 Iliacus Hip flexion  5  5   L3/4 Qudratus Femoris Knee Extension  5  5   L4/5 Tib Anterior Ankle Dorsiflexion   5  5   L5/S1 Extensor Hallicus Longus Great toe extension  5-  5   L4/5 Tib Anterior/Posterior Inversion  5  5   L5/S1 Extensor Digitorum Longus, Peronues Eversion  5  5   S1/S2 Gastroc/Soleus Plantar Flexion  5  5       Imaging:  MRI lumbar spine 7/16/19  FINDINGS:  There is mild lumbar scoliosis convex to the right.  No spondylolisthesis is seen.  The vertebral bodies are intact without evidence of fracture or compression.  There are however significant bone marrow changes identified in L4 and L5 adjacent to the disc space consistent with degenerative disc disease.  There is also significant  disc space narrowing identified at L4-5.  There is decreased MRI signal from all the lumbar disc consistent with desiccation.    At L2-3 there is hypertrophy of the facets and ligamentum flavum and short pedicles with mild spinal canal stenosis.  At L3-4 there is more prominent asymmetric hypertrophy of the facets and ligamentum flavum and short pedicles which produces moderate spinal canal stenosis and right greater than left neural foraminal stenosis.  At L4-5 there is circumferential disc protrusion which protrudes more significantly to the right as well as short pedicles and hypertrophy of the facets which produces severe spinal canal and right greater than left neural foraminal stenosis.  At L5-S1 stenosis is not seen.    Labs:  BMP  Lab Results   Component Value Date     07/15/2019    K 4.3 07/15/2019    CL 96 07/15/2019    CO2 29 07/15/2019    BUN 24 (H) 07/15/2019    CREATININE 4.7 (H) 07/15/2019    CALCIUM 9.7 07/15/2019    ANIONGAP 13 07/15/2019    ESTGFRAFRICA 9.8 (A) 07/15/2019    EGFRNONAA 8.5 (A) 07/15/2019     Lab Results   Component Value Date    ALT 21 07/15/2019    AST 19 07/15/2019    GGT 25 09/02/2014    ALKPHOS 177 (H) 07/15/2019    BILITOT 0.8 07/15/2019       Assessment:  Problem List Items Addressed This Visit        Neuro    Lumbar radiculopathy - Primary       Orthopedic    Chronic bilateral low back pain with right-sided sciatica          Treatment Plan:  73 y.o. year old female with PMH ESRD s/p kidney transplant currently on dialyiss, primary biliary cholangitis s/p OLT x2, HTN, presents to the office with back pain.  She has had the pain for about the past 6 months.  Today her pain is 6/10, intermittent, aching, numb, radiating down her right leg to her right big toe.  + numbness, + weakness, no bowel/bladder dysfunction.  Her pain is worse with prolonged standing and relived with rest and sitting.  She currently takes gabapentin qhs every other day with mild relief.  She avoids  NSAIDs 2/2 to renal/liver history.  She has tried and failed tramadol in the past.  She is currently doing PT without a significant improvement.  On exam she has 5-/5 right EHL, 1+ b/l patellar, 0/0 b/l achilles, sensation intact to light touch b/l L2-S1, + b/l axial facet loading.  Reviewed her MRI lumbar spine today in the office, she has b/l multilevel facet arthropathy, L4-5 circumferential disc protrusion which protrudes more significantly to the right which produces severe spinal canal and right greater than left neural foraminal stenosis.  I think her pain is predominantly related to the disc and NFS at L4-5, with some overlapping axial back pain from her facets.  Her pain is limiting her mobility and interfering with her ADL's.  Will schedule for right L4/5 TFESI.  Discussed will need clearance to hold plavix for 7 days and also discussed her increased risk of infection given immunosuppression to prevent transplant rejection.  She will follow up 2 weeks post procedure.    9/17/19 - Ms. Hamilton returns to the office for follow up.  She is s/p right L4/5 TFESI 8/28/19 with about 25% relief of her pain.  Today she continues to have pain radiating down the right leg to the right big toes, can get to 8/10, occasional numbness and weakness.  On exam she continues to have some 5-/5 weakness of right EHL.  She has tried PT but feels like it makes her pain worse.  I believe her right sided radiculopathy is secondary to the L4-5 right disc protrusion that is causing right NFS and canal stenosis.  I discussed nsgy referral vs repeat TFESI.  At this time her pain is limiting her mobility and interfering with her ADL's and she would like to try TFESI one more time.  Will need to hold plavix for 7 days prior to injection.  She will follow up 2 weeks post injection, if still no significant improvement will have her see one of our neurosurgeons.       Procedures: right L4/5 TFESI.  Procedure explained using an anatomical  model.  Risks, benefits, alternatives explained to patient who verbalized understanding, including increased risk of infection, bleeding, need for additional procedures or surgery, and nerve damage.  Questions regarding the procedure, risks, expected outcome, and possible side effects were solicited and answered to the patient's satisfaction.  Dacia wishes to proceed with the injection.  Verbal and written consent were obtained in clinic today.  PT/OT/HEP: continue PT and HEP  Medications: continue gabapentin 100mg qhs.  She has failed tramadol in the past and is not interested in trying it again. Offered hydrocodone, but likely to make her nauseas as she had that problem with tramadol.  She is also not interested in tylenol codeine at this time.  Labs: Reviewed and medications are appropriately dosed for current hepatorenal function.  Imaging: No additional recommended at this time.    : Not applicable    Joao Campbell M.D.  Interventional Pain Medicine / Anesthesiology

## 2019-09-18 NOTE — ED NOTES
"S/P assisted ER MD with suturing of A-V graft (Lt arm) -- suture placed by ER MD & covered with quick clot & pressure dressing with 4" x 4"s & Coban  "

## 2019-09-18 NOTE — ED PROVIDER NOTES
Encounter Date: 9/18/2019    SCRIBE #1 NOTE: IMisti am scribing for, and in the presence of, Rahul Hodge MD.       History     Chief Complaint   Patient presents with    Coagulation Disorder     Pt bleeding from dialysis shunt after treatment.  Bleed was uncontrollable at dialysis center.  Pressure bandage applied       Time seen by provider: 10:45 AM on 09/18/2019    Dacia Hamilton is a 73 y.o. female with chronic kidney failure who presents to the ED via EMS following her dialysis treatment with an onset of uncontrolled bleeding from her dialysis shunt. She arrived with a pressure bandage to the left arm to control the bleeding. Patient has long-term use of anticoagulants. The patient denies SOB, chest pain, or any other symptoms at this time. Pertinent  PMHx includes CHF, hyperlipidemia, hypothyroidism, hypertension, PUD, anemia, and primary biliary cirrhosis. Pertinent PSHx includes combined liver-kidney transplant, cholecystectomy, AV fistula placement, and hepaticojejunostomy. Drug allergies include Prolia, Flonase and Tramadol.      The history is provided by the patient.     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    Bursitis of left hip     Cataract     OU done//    CHF (congestive heart failure)     Chronic kidney disease     Disorder of kidney and ureter     Encounter for blood transfusion     GERD (gastroesophageal reflux disease)     takes prilosec    Hyperlipidemia     Hypertension     Hypothyroidism     Influenza 01/2017    Osteoarthritis     Osteoporosis     Peptic ulcer disease     Primary biliary cirrhosis 11/91, 11/91, 6/06    3 Liver Transplants  (first 2 in same mo)    PVD (posterior vitreous detachment), bilateral     Sciatica     Urinary tract infection      Past Surgical History:   Procedure Laterality Date    2 previous liver transplant  1991-     at [place]    APPENDECTOMY      AV FISTULA PLACEMENT Left 2017    BIOPSY, LIVER N/A 2018    Performed by [facility] Provider at [facility] OR 2ND FLR    BJDKSP-KLHYIH-ZK BONE MARROW BX N/A 2016    Performed by [facility]ley [facility] at [facility] CATH    CATARACT EXTRACTION BILATERAL W/ ANTERIOR VITRECTOMY      CATARACT EXTRACTION W/  INTRAOCULAR LENS IMPLANT Bilateral      SECTION, CLASSIC  1968    CHOLECYSTECTOMY      COLONOSCOPY  2014    Dr. Sánchez; normal findings per patient report    combined liver-kidney transplant  2006    Primary Biliary Cirrhosis    BJMKEBHN-PGLHYIV-RF-Arm Left 2017    Performed by Justin Fermin [facility] at [facility] OR    CYSTOSCOPY      ESOPHAGOGASTRODUODENOSCOPY (EGD) N/A 2017    [facility] ENDO    ESOPHAGOGASTRODUODENOSCOPY (EGD) N/A 2017    Performed by Wisam Gibbons Jr[facility] at [facility] ENDO    EYE SURGERY      HEART CATH-LEFT N/A 3/3/2017    Performed by Victor Hugo Hitchcock MD at [facility] CATH    hepaticojejunostomy (pretty en Y)  10/2006    Injection,steroid,epidural,transforaminal approach L4/5 Right 2019    [facility] OR    KIDNEY TRANSPLANT      25% of Both Kidneys/Third Kidney    LIVER TRANSPLANT      #3 liver transplants     TONSILLECTOMY      TRANSPLANT-KIDNEY N/A 2016    Performed by [facility] Diagnostic Provider at [facility] OR 2ND FLR    TUBAL LIGATION  1968    UPPER GASTROINTESTINAL ENDOSCOPY  2017    Dr. Gibbons, repeat in 2-3 months    yag cap OD Right      Family History   Problem Relation Age of Onset    Hypertension Mother     Cataracts Mother     Diabetes Mother     Hypertension Father     Cataracts Father     No Known Problems  Sister     Hypertension Brother     No Known Problems Brother     Kidney disease Neg Hx     Colon cancer Neg Hx     Colon polyps Neg Hx     Crohn's disease Neg Hx     Ulcerative colitis Neg Hx     Amblyopia Neg Hx     Blindness Neg Hx     Glaucoma Neg Hx     Macular degeneration Neg Hx     Retinal detachment Neg Hx     Strabismus Neg Hx     Cancer Neg Hx     Stroke Neg Hx     Thyroid disease Neg Hx      Social History     Tobacco Use    Smoking status: Never Smoker    Smokeless tobacco: Never Used   Substance Use Topics    Alcohol use: No    Drug use: No     Review of Systems   Constitutional: Negative for fever.   HENT: Negative for sore throat.    Respiratory: Negative for shortness of breath.    Cardiovascular: Negative for chest pain.   Gastrointestinal: Negative for nausea.   Genitourinary: Negative for dysuria.   Musculoskeletal: Negative for back pain.        Positive for uncontrolled bleeding from dialysis shunt on left arm.   Skin: Negative for rash.   Neurological: Negative for weakness.   Hematological: Bruises/bleeds easily.       Physical Exam     Initial Vitals [09/18/19 1015]   BP Pulse Resp Temp SpO2   (!) 158/86 67 20 98.8 °F (37.1 °C) 99 %      MAP       --         Physical Exam    Nursing note and vitals reviewed.  Constitutional: She appears well-developed.   HENT:   Head: Normocephalic and atraumatic.   Eyes: EOM are normal. Pupils are equal, round, and reactive to light.   Neck: Neck supple.   Cardiovascular: Normal rate, regular rhythm, normal heart sounds and intact distal pulses. Exam reveals no gallop and no friction rub.    No murmur heard.  Pulmonary/Chest: Breath sounds normal. No respiratory distress. She has no decreased breath sounds. She has no wheezes. She has no rhonchi. She has no rales.   Abdominal: Soft. Bowel sounds are normal. She exhibits no distension. There is no tenderness.   Musculoskeletal: Normal range of motion.   Presence of pressure bandage on  left arm.   Neurological: She is alert and oriented to person, place, and time.   Skin: Skin is warm and dry.   Psychiatric: She has a normal mood and affect.         ED Course   Lac Repair  Date/Time: 9/18/2019 11:54 AM  Performed by: Rahul Hodge MD  Authorized by: Rahul Hdoge MD   Consent Done: Not Needed  Body area: upper extremity  Location details: left upper arm  Laceration length: 0.5 cm  Tendon involvement: none  Nerve involvement: none  Vascular damage: yes (fistual)  Anesthesia: local infiltration    Anesthesia:  Local Anesthetic: lidocaine 1% with epinephrine  Anesthetic total: 2 mL  Patient sedated: no  Preparation: Patient was prepped and draped in the usual sterile fashion (Blood pressure cuff was inflated and we held pressure above and below the bleeding fistula site).  Wound skin closure material used: 4-0 Prolene non cutting needle.  Technique: complex (Figure-of-eight)  Approximation: close  Approximation difficulty: complex  Dressing: gauze packing (Surgicel, quick clot, 4 x 4 w/ kerlex)  Patient tolerance: Patient tolerated the procedure well with no immediate complications  Comments: Patient had a significant amount of blood loss.  Approximately 250-500 mL        Labs Reviewed   CBC W/ AUTO DIFFERENTIAL - Abnormal; Notable for the following components:       Result Value    RBC 3.46 (*)     Mean Corpuscular Volume 110 (*)     Mean Corpuscular Hemoglobin 35.5 (*)     Eos # 0.8 (*)     Eosinophil% 8.9 (*)     All other components within normal limits   PROTIME-INR   APTT   TYPE & SCREEN          Imaging Results    None          Medical Decision Making:   History:   Old Medical Records: I decided to obtain old medical records.  ED Management:  Patient had a bleeding dialysis fistula.  We rate able to get it stopped with a figure-of-eight suture holding direct pressure and using a blood pressure cuff.  She appeared to have significant blood loss.  I will check a CBC.    11:59 AM thankfully  her hemoglobin and hematocrit are 12 and 38 with normal platelets.  No further bleeding at this time.  She will need to follow up with the vascular surgeon.  She has an appointment this Friday.  Vital signs are stable.            Scribe Attestation:   Scribe #1: I performed the above scribed service and the documentation accurately describes the services I performed. I attest to the accuracy of the note.    Attending Attestation:             Attending ED Notes:   12:09 PM  Spoke with the nurse of the patients vascular surgeon, Dr. Justin Fermin, and she agrees with my course of treatment.  I, Dr. Rahul Hodge personally performed the services described in this documentation. All medical record entries made by the scribe were at my direction and in my presence.  I have reviewed the chart and agree that the record reflects my personal performance and is accurate and complete. Rahul Hodge MD.  12:00 PM 09/18/2019    DISCLAIMER: This note was prepared with Dragon NaturallySpeaking voice recognition transcription software. Garbled syntax, mangled pronouns, and other bizarre constructions may be attributed to that software system         ED Course as of Sep 18 1247   Wed Sep 18, 2019   1213 I spokeWith the patient's vascular surgeon, Dr. Fermin.  He has an appointment with her on Friday.  He wants to hold her Plavix at this time.  He wants cyst Ace wrap make sure she has a thrill and bruit.  She is not bleeding at this time.  She should be stable for discharge.     [JS]      ED Course User Index  [JS] Rahul Hodge MD     Clinical Impression:       ICD-10-CM ICD-9-CM   1. Complication of arteriovenous dialysis fistula, initial encounter T82.9XXA 996.73         Disposition:   Disposition: Discharged                        Rahul Hodge MD  09/18/19 4656

## 2019-09-18 NOTE — ED NOTES
Dressing reinforced with ACE per ER MD - palpable thrill to same both above & below dressing noted. Pt advised not to remove dressing until seen by vascular surgeon. No breakthrough bleeding noted. D/C with instructions via private auto with family @ this time

## 2019-09-18 NOTE — DISCHARGE INSTRUCTIONS
To not take her Plavix today tomorrow or Friday.  Follow up with her vascular surgeon at her previously scheduled appointment on Friday.  If you have worsening bleeding return to the emergency room.  If you notice a small amount of blood leaking through the bandage call the vascular surgeon's office and they can probably see you tomorrow.  Keep the Ace wrap on the bandage until you see her vascular surgeon on Friday

## 2019-09-30 NOTE — TELEPHONE ENCOUNTER
Spoke to patient and she will be moved to next week on Tuesday. Patient verbalized understanding and will call me back if she starts to have any other symptoms of infection.

## 2019-10-04 ENCOUNTER — TELEPHONE (OUTPATIENT)
Dept: FAMILY MEDICINE | Facility: CLINIC | Age: 74
End: 2019-10-04

## 2019-10-07 ENCOUNTER — TELEPHONE (OUTPATIENT)
Dept: FAMILY MEDICINE | Facility: CLINIC | Age: 74
End: 2019-10-07

## 2019-10-07 NOTE — TELEPHONE ENCOUNTER
Spoke w/ Tonya. Pt passed away at home. No other info available. She is going to contact them and get more info and see if they are actually going to sign it. If not, she is going to call us back with further instructions we may need to get info from 's office.

## 2019-10-07 NOTE — TELEPHONE ENCOUNTER
----- Message from Filomena Tubbs sent at 10/7/2019  8:13 AM CDT -----  Contact: Tonya RUIZ/ [facility]  Type: Needs Medical Advice    Who Called:  Tonya Lincoln Call Back Number: [phone]  Additional Information: Req a call back regarding signing a death certificate for  pt   Please Advise ---Thank you

## 2019-10-10 ENCOUNTER — POST MORTEM DOCUMENTATION (OUTPATIENT)
Dept: TRANSPLANT | Facility: CLINIC | Age: 74
End: 2019-10-10

## 2019-11-07 RX ORDER — LEVOTHYROXINE SODIUM 75 UG/1
TABLET ORAL
Qty: 90 TABLET | Refills: 3 | OUTPATIENT
Start: 2019-11-07

## 2019-11-20 RX ORDER — GABAPENTIN 100 MG/1
CAPSULE ORAL
Qty: 90 CAPSULE | Refills: 1 | OUTPATIENT
Start: 2019-11-20

## 2021-03-25 NOTE — TELEPHONE ENCOUNTER
Called and spoke with pt, states she is feeling better. States she had a lot of fluid removed while she was inpatient.  States her breathing is back to normal.  Agreed to have tx labs drawn tomorrow.    Quality 130: Documentation Of Current Medications In The Medical Record: Current Medications Documented Quality 431: Preventive Care And Screening: Unhealthy Alcohol Use - Screening: Patient screened for unhealthy alcohol use using a single question and scores less than 2 times per year Quality 226: Preventive Care And Screening: Tobacco Use: Screening And Cessation Intervention: Patient screened for tobacco use and is an ex/non-smoker Detail Level: Generalized

## 2021-05-03 NOTE — TELEPHONE ENCOUNTER
----- Message from Jose Alfredo Godfrey MD sent at 4/24/2017 12:49 PM CDT -----  Hi. Dr. Woods,    I saw Ms. Hamilton at dialysis today--she is doing great.     Question for you:  She was on ASA 81mg and Plavix prior to UGI bleed, Plavix was added by vascular surgery for a stent.     I am in favor of stopping ASA--your thoughts?     Thanks,  Jose Alfredo   3/23/2021  6/23/2021

## 2022-08-04 NOTE — TELEPHONE ENCOUNTER
----- Message from Victor Hugo Wise MD sent at 6/19/2018  8:48 AM CDT -----  Results reviewed  LFTs are rising. Can we do a biopsy?   Nostril Rim Text: The closure involved the nostril rim.

## 2023-10-20 NOTE — LETTER
July 16, 2019    Dacia Hamilton  [address] [place] [address]          Dear Dacia Hamilton:  MRN: [id number]    This is a follow up to your recent labs, your lab results were stable.  There are no medicine changes.  Please have your labs drawn again on 10/14/19.      If you cannot have your labs drawn on the scheduled date, it is your responsibility to call the transplant department to reschedule.  To reschedule or make an appointment, please as to speak to or leave a message for my assistant, Abida Pal or Karen, at [phone].  When leaving a message for Abida Pal Angela or myself, we ask that you leave a brief message regarding your request.    Sincerely,    Lorin Mejia, RN, BSN, [facility]  Liver Transplant Coordinator  [facility]  [address] [place] [id number]  [phone]        Assistance OOB with selected safe patient handling equipment if applicable/Assistance with ambulation/Communicate risk of Fall with Harm to all staff, patient, and family/Monitor gait and stability/Provide patient with walking aids/Provide visual cue: red socks, yellow wristband, yellow gown, etc/Reinforce activity limits and safety measures with patient and family/Bed in lowest position, wheels locked, appropriate side rails in place/Call bell, personal items and telephone in reach/Instruct patient to call for assistance before getting out of bed/chair/stretcher/Non-slip footwear applied when patient is off stretcher/[place] to call system/Physically safe environment - no spills, clutter or unnecessary equipment/Purposeful Proactive Rounding/Room/bathroom lighting operational, light cord in reach

## 2024-02-27 NOTE — TELEPHONE ENCOUNTER
11:10 AM    Reason for Call: OVERBOOK    Patient is having the following symptoms:  for possible yeast infection in toes and little blood on top of them.    The patient is requesting an appointment for  with [facility].    Was an appointment offered for this call? Yes  If yes : Appointment type              Date    Preferred method for responding to this message: Telephone Call  What is your phone number ? [phone] [place]    If we cannot reach you directly, may we leave a detailed response at the number you provided? Yes    Can this message wait until your PCP/provider returns, if unavailable today? YES, provider is in today    NIRAJ CARRILLO   Returned call, spoke with Julia. Requested new form with correct provider. Julia faxing now.

## (undated) DEVICE — APPLICATOR CHLORAPREP CLR 10.5

## (undated) DEVICE — NDL SPINAL SPINOCAN 22GX3.5

## (undated) DEVICE — GLOVE 7.5 PROTEXIS PI MICRO

## (undated) DEVICE — SOL SOD CHLORIDE 0.9% 10ML

## (undated) DEVICE — SEE MEDLINE ITEM 152622

## (undated) DEVICE — TRAY NERVE BLOCK